# Patient Record
Sex: FEMALE
[De-identification: names, ages, dates, MRNs, and addresses within clinical notes are randomized per-mention and may not be internally consistent; named-entity substitution may affect disease eponyms.]

---

## 2017-09-19 ENCOUNTER — RESULT REVIEW (OUTPATIENT)
Age: 62
End: 2017-09-19

## 2024-04-19 ENCOUNTER — INPATIENT (INPATIENT)
Facility: HOSPITAL | Age: 69
LOS: 1 days | Discharge: HOME | DRG: 72 | End: 2024-04-21
Attending: STUDENT IN AN ORGANIZED HEALTH CARE EDUCATION/TRAINING PROGRAM | Admitting: INTERNAL MEDICINE
Payer: COMMERCIAL

## 2024-04-19 VITALS
WEIGHT: 150.58 LBS | HEIGHT: 61 IN | OXYGEN SATURATION: 98 % | RESPIRATION RATE: 18 BRPM | SYSTOLIC BLOOD PRESSURE: 155 MMHG | TEMPERATURE: 98 F | DIASTOLIC BLOOD PRESSURE: 68 MMHG | HEART RATE: 84 BPM

## 2024-04-19 LAB
ALBUMIN SERPL ELPH-MCNC: 4 G/DL — SIGNIFICANT CHANGE UP (ref 3.3–5.2)
ALP SERPL-CCNC: 78 U/L — SIGNIFICANT CHANGE UP (ref 40–120)
ALT FLD-CCNC: 18 U/L — SIGNIFICANT CHANGE UP
ANION GAP SERPL CALC-SCNC: 14 MMOL/L — SIGNIFICANT CHANGE UP (ref 5–17)
APPEARANCE UR: CLEAR — SIGNIFICANT CHANGE UP
APTT BLD: 36.7 SEC — HIGH (ref 24.5–35.6)
AST SERPL-CCNC: 20 U/L — SIGNIFICANT CHANGE UP
BACTERIA # UR AUTO: NEGATIVE /HPF — SIGNIFICANT CHANGE UP
BASE EXCESS BLDV CALC-SCNC: 0.9 MMOL/L — SIGNIFICANT CHANGE UP (ref -2–3)
BASOPHILS # BLD AUTO: 0.03 K/UL — SIGNIFICANT CHANGE UP (ref 0–0.2)
BASOPHILS NFR BLD AUTO: 0.4 % — SIGNIFICANT CHANGE UP (ref 0–2)
BILIRUB SERPL-MCNC: 0.3 MG/DL — LOW (ref 0.4–2)
BILIRUB UR-MCNC: NEGATIVE — SIGNIFICANT CHANGE UP
BUN SERPL-MCNC: 17.1 MG/DL — SIGNIFICANT CHANGE UP (ref 8–20)
CA-I SERPL-SCNC: 1.14 MMOL/L — LOW (ref 1.15–1.33)
CALCIUM SERPL-MCNC: 8.8 MG/DL — SIGNIFICANT CHANGE UP (ref 8.4–10.5)
CAST: 1 /LPF — SIGNIFICANT CHANGE UP (ref 0–4)
CHLORIDE BLDV-SCNC: 107 MMOL/L — SIGNIFICANT CHANGE UP (ref 96–108)
CHLORIDE SERPL-SCNC: 104 MMOL/L — SIGNIFICANT CHANGE UP (ref 96–108)
CO2 SERPL-SCNC: 23 MMOL/L — SIGNIFICANT CHANGE UP (ref 22–29)
COLOR SPEC: YELLOW — SIGNIFICANT CHANGE UP
CREAT SERPL-MCNC: 0.6 MG/DL — SIGNIFICANT CHANGE UP (ref 0.5–1.3)
DIFF PNL FLD: NEGATIVE — SIGNIFICANT CHANGE UP
EGFR: 97 ML/MIN/1.73M2 — SIGNIFICANT CHANGE UP
EOSINOPHIL # BLD AUTO: 0.13 K/UL — SIGNIFICANT CHANGE UP (ref 0–0.5)
EOSINOPHIL NFR BLD AUTO: 1.7 % — SIGNIFICANT CHANGE UP (ref 0–6)
GAS PNL BLDV: 139 MMOL/L — SIGNIFICANT CHANGE UP (ref 136–145)
GAS PNL BLDV: SIGNIFICANT CHANGE UP
GLUCOSE BLDV-MCNC: 113 MG/DL — HIGH (ref 70–99)
GLUCOSE SERPL-MCNC: 107 MG/DL — HIGH (ref 70–99)
GLUCOSE UR QL: NEGATIVE MG/DL — SIGNIFICANT CHANGE UP
HCO3 BLDV-SCNC: 26 MMOL/L — SIGNIFICANT CHANGE UP (ref 22–29)
HCT VFR BLD CALC: 36.7 % — SIGNIFICANT CHANGE UP (ref 34.5–45)
HCT VFR BLDA CALC: 38 % — SIGNIFICANT CHANGE UP
HGB BLD CALC-MCNC: 12.7 G/DL — SIGNIFICANT CHANGE UP (ref 11.7–16.1)
HGB BLD-MCNC: 12.5 G/DL — SIGNIFICANT CHANGE UP (ref 11.5–15.5)
IMM GRANULOCYTES NFR BLD AUTO: 0.3 % — SIGNIFICANT CHANGE UP (ref 0–0.9)
INR BLD: 0.9 RATIO — SIGNIFICANT CHANGE UP (ref 0.85–1.18)
KETONES UR-MCNC: NEGATIVE MG/DL — SIGNIFICANT CHANGE UP
LACTATE BLDV-MCNC: 2.2 MMOL/L — HIGH (ref 0.5–2)
LEUKOCYTE ESTERASE UR-ACNC: ABNORMAL
LIDOCAIN IGE QN: 46 U/L — SIGNIFICANT CHANGE UP (ref 22–51)
LYMPHOCYTES # BLD AUTO: 1.81 K/UL — SIGNIFICANT CHANGE UP (ref 1–3.3)
LYMPHOCYTES # BLD AUTO: 23.2 % — SIGNIFICANT CHANGE UP (ref 13–44)
MAGNESIUM SERPL-MCNC: 1.8 MG/DL — SIGNIFICANT CHANGE UP (ref 1.6–2.6)
MCHC RBC-ENTMCNC: 30.3 PG — SIGNIFICANT CHANGE UP (ref 27–34)
MCHC RBC-ENTMCNC: 34.1 GM/DL — SIGNIFICANT CHANGE UP (ref 32–36)
MCV RBC AUTO: 88.9 FL — SIGNIFICANT CHANGE UP (ref 80–100)
MONOCYTES # BLD AUTO: 0.71 K/UL — SIGNIFICANT CHANGE UP (ref 0–0.9)
MONOCYTES NFR BLD AUTO: 9.1 % — SIGNIFICANT CHANGE UP (ref 2–14)
NEUTROPHILS # BLD AUTO: 5.11 K/UL — SIGNIFICANT CHANGE UP (ref 1.8–7.4)
NEUTROPHILS NFR BLD AUTO: 65.3 % — SIGNIFICANT CHANGE UP (ref 43–77)
NITRITE UR-MCNC: NEGATIVE — SIGNIFICANT CHANGE UP
NT-PROBNP SERPL-SCNC: <36 PG/ML — SIGNIFICANT CHANGE UP (ref 0–300)
PCO2 BLDV: 44 MMHG — HIGH (ref 39–42)
PH BLDV: 7.38 — SIGNIFICANT CHANGE UP (ref 7.32–7.43)
PH UR: 5.5 — SIGNIFICANT CHANGE UP (ref 5–8)
PLATELET # BLD AUTO: 294 K/UL — SIGNIFICANT CHANGE UP (ref 150–400)
PO2 BLDV: 112 MMHG — HIGH (ref 25–45)
POTASSIUM BLDV-SCNC: 3.9 MMOL/L — SIGNIFICANT CHANGE UP (ref 3.5–5.1)
POTASSIUM SERPL-MCNC: 3.8 MMOL/L — SIGNIFICANT CHANGE UP (ref 3.5–5.3)
POTASSIUM SERPL-SCNC: 3.8 MMOL/L — SIGNIFICANT CHANGE UP (ref 3.5–5.3)
PROT SERPL-MCNC: 6.7 G/DL — SIGNIFICANT CHANGE UP (ref 6.6–8.7)
PROT UR-MCNC: NEGATIVE MG/DL — SIGNIFICANT CHANGE UP
PROTHROM AB SERPL-ACNC: 10 SEC — SIGNIFICANT CHANGE UP (ref 9.5–13)
RBC # BLD: 4.13 M/UL — SIGNIFICANT CHANGE UP (ref 3.8–5.2)
RBC # FLD: 12.4 % — SIGNIFICANT CHANGE UP (ref 10.3–14.5)
RBC CASTS # UR COMP ASSIST: 1 /HPF — SIGNIFICANT CHANGE UP (ref 0–4)
SAO2 % BLDV: 98.6 % — SIGNIFICANT CHANGE UP
SODIUM SERPL-SCNC: 141 MMOL/L — SIGNIFICANT CHANGE UP (ref 135–145)
SP GR SPEC: 1.02 — SIGNIFICANT CHANGE UP (ref 1–1.03)
SQUAMOUS # UR AUTO: 2 /HPF — SIGNIFICANT CHANGE UP (ref 0–5)
TROPONIN T, HIGH SENSITIVITY RESULT: 6 NG/L — SIGNIFICANT CHANGE UP (ref 0–51)
UROBILINOGEN FLD QL: 1 MG/DL — SIGNIFICANT CHANGE UP (ref 0.2–1)
WBC # BLD: 7.81 K/UL — SIGNIFICANT CHANGE UP (ref 3.8–10.5)
WBC # FLD AUTO: 7.81 K/UL — SIGNIFICANT CHANGE UP (ref 3.8–10.5)
WBC UR QL: 8 /HPF — HIGH (ref 0–5)

## 2024-04-19 PROCEDURE — 70496 CT ANGIOGRAPHY HEAD: CPT | Mod: 26,MC

## 2024-04-19 PROCEDURE — 70450 CT HEAD/BRAIN W/O DYE: CPT | Mod: 26,XU,MC

## 2024-04-19 PROCEDURE — 99285 EMERGENCY DEPT VISIT HI MDM: CPT

## 2024-04-19 PROCEDURE — 71045 X-RAY EXAM CHEST 1 VIEW: CPT | Mod: 26

## 2024-04-19 PROCEDURE — 70498 CT ANGIOGRAPHY NECK: CPT | Mod: 26,MC

## 2024-04-19 RX ORDER — DEXAMETHASONE 0.5 MG/5ML
10 ELIXIR ORAL ONCE
Refills: 0 | Status: COMPLETED | OUTPATIENT
Start: 2024-04-19 | End: 2024-04-20

## 2024-04-19 RX ORDER — ONDANSETRON 8 MG/1
4 TABLET, FILM COATED ORAL ONCE
Refills: 0 | Status: COMPLETED | OUTPATIENT
Start: 2024-04-19 | End: 2024-04-19

## 2024-04-19 RX ADMIN — ONDANSETRON 4 MILLIGRAM(S): 8 TABLET, FILM COATED ORAL at 18:30

## 2024-04-19 NOTE — ED PROVIDER NOTE - OBJECTIVE STATEMENT
A 69 yo F with pmh of HLD, ?AFib (no f/u by cardiology), on aspirin 81mg, not on AC, presents to the ED for 2 weeks of "coordination problem". Pt reports that 2 weeks ago, Pt had chest tightness while Pt was cleaning up the yard, had nausea at that time, then Pt drunk a lot water, resolved chest tightness a hour later. Since then Pt has been having nausea and dizziness (not spinning) and has difficulty in closing buttons, using a hair pin, and cooking. Denies HA, visual changes, speech problem, weakness in the extremities, or difficulty in speaking. Reports no problem for walking. Pt wants to be checked for stroke.

## 2024-04-19 NOTE — ED PROVIDER NOTE - ATTENDING CONTRIBUTION TO CARE
67 yo F with pmh of HLD, ?AFib (no f/u by cardiology), on aspirin 81mg, not on AC, presents to the ED for 2 weeks of "coordination problem". Pt reports that 2 weeks ago, Pt had chest tightness while Pt was cleaning up the yard, had nausea at that time, then Pt drunk a lot water, resolved chest tightness a hour later. Since then Pt has been having nausea and dizziness (not spinning) and has difficulty in closing buttons, using a hair pin, and cooking. Denies HA, visual changes, speech problem, weakness in the extremities, or difficulty in speaking.   pt with coordination / fine motor skills difficulty    CT sacn if negative MRI

## 2024-04-19 NOTE — ED ADULT TRIAGE NOTE - CHIEF COMPLAINT QUOTE
pt states she is losing her motor skills and nausea for 1 week. pt states she is dropping things from her hands and losing her  strength.

## 2024-04-19 NOTE — ED PROVIDER NOTE - CRANIAL NERVE AND PUPILLARY EXAM
cough reflex intact/central and peripheral vision intact/peripheral vision intact/extra-ocular movements intact/gag reflex intact/tongue is midline

## 2024-04-19 NOTE — ED ADULT NURSE REASSESSMENT NOTE - NS ED NURSE REASSESS COMMENT FT1
Pt. care assumed at 1930, no apparent distress noted at this time, charting as noted. Pt received A&Ox4, resting in stretcher, pending CT. Updated on plan of care.

## 2024-04-19 NOTE — ED PROVIDER NOTE - CLINICAL SUMMARY MEDICAL DECISION MAKING FREE TEXT BOX
Likely posterior circulation stroke vs other intracranial abnormality vs chronic/metabolic neuro disease. ECG shows NSR, no ischemia.   Will check CT H, CTA head and neck. Will check cbc, cmp, coags, trop T, lipase, mg, cxr, UA. Symptom management and reassess.

## 2024-04-19 NOTE — CONSULT NOTE ADULT - SUPERVISING ATTENDING
I personally reviewed the patient's imaging. History and plan discussed with HANSEL Ji, agree with above.

## 2024-04-19 NOTE — ED PROVIDER NOTE - SKIN, MLM
Cole Lowe needs to be seen for an appointment before further refills are given.    
Skin normal color for race, warm, dry and intact. No evidence of rash.

## 2024-04-19 NOTE — ED ADULT NURSE NOTE - OBJECTIVE STATEMENT
Pt to ED c/o decreased bilateral hand fine motor skills x1wk. Pt states she has been unable to  things that she usually can and has been dropping things. Pt to ED c/o decreased bilateral hand fine motor skills x1wk. Pt states she has been unable to  things that she usually can and has been dropping things. Pt denies weakness, headache, n/v, dizziness/lightheadedness, difficulty walking/talking.  equal. Pt A&Ox4 in NAD @ this time. RR even & unlabored. Pt to ED c/o decreased bilateral hand fine motor skills x1wk. Pt states she has been unable to  things that she usually can and has been dropping things. Pt also c/o nausea. Pt denies weakness, headache, vomiting, dizziness/lightheadedness, difficulty walking/talking.  equal. Pt A&Ox4 in NAD @ this time. RR even & unlabored.

## 2024-04-20 DIAGNOSIS — G93.9 DISORDER OF BRAIN, UNSPECIFIED: ICD-10-CM

## 2024-04-20 PROCEDURE — 70553 MRI BRAIN STEM W/O & W/DYE: CPT | Mod: 26

## 2024-04-20 PROCEDURE — 99223 1ST HOSP IP/OBS HIGH 75: CPT

## 2024-04-20 PROCEDURE — 99233 SBSQ HOSP IP/OBS HIGH 50: CPT

## 2024-04-20 PROCEDURE — 99223 1ST HOSP IP/OBS HIGH 75: CPT | Mod: GC

## 2024-04-20 PROCEDURE — 99497 ADVNCD CARE PLAN 30 MIN: CPT | Mod: GC,25

## 2024-04-20 PROCEDURE — 74177 CT ABD & PELVIS W/CONTRAST: CPT | Mod: 26

## 2024-04-20 PROCEDURE — 71260 CT THORAX DX C+: CPT | Mod: 26

## 2024-04-20 RX ORDER — DILTIAZEM HCL 120 MG
120 CAPSULE, EXT RELEASE 24 HR ORAL DAILY
Refills: 0 | Status: DISCONTINUED | OUTPATIENT
Start: 2024-04-20 | End: 2024-04-20

## 2024-04-20 RX ORDER — DILTIAZEM HCL 120 MG
180 CAPSULE, EXT RELEASE 24 HR ORAL DAILY
Refills: 0 | Status: DISCONTINUED | OUTPATIENT
Start: 2024-04-20 | End: 2024-04-20

## 2024-04-20 RX ORDER — LEVETIRACETAM 250 MG/1
500 TABLET, FILM COATED ORAL EVERY 12 HOURS
Refills: 0 | Status: DISCONTINUED | OUTPATIENT
Start: 2024-04-20 | End: 2024-04-21

## 2024-04-20 RX ORDER — ONDANSETRON 8 MG/1
4 TABLET, FILM COATED ORAL EVERY 8 HOURS
Refills: 0 | Status: DISCONTINUED | OUTPATIENT
Start: 2024-04-20 | End: 2024-04-21

## 2024-04-20 RX ORDER — DILTIAZEM HCL 120 MG
180 CAPSULE, EXT RELEASE 24 HR ORAL DAILY
Refills: 0 | Status: DISCONTINUED | OUTPATIENT
Start: 2024-04-20 | End: 2024-04-21

## 2024-04-20 RX ORDER — ACETAMINOPHEN 500 MG
650 TABLET ORAL EVERY 6 HOURS
Refills: 0 | Status: DISCONTINUED | OUTPATIENT
Start: 2024-04-20 | End: 2024-04-21

## 2024-04-20 RX ORDER — DEXAMETHASONE 0.5 MG/5ML
4 ELIXIR ORAL EVERY 6 HOURS
Refills: 0 | Status: DISCONTINUED | OUTPATIENT
Start: 2024-04-20 | End: 2024-04-21

## 2024-04-20 RX ORDER — LANOLIN ALCOHOL/MO/W.PET/CERES
3 CREAM (GRAM) TOPICAL AT BEDTIME
Refills: 0 | Status: DISCONTINUED | OUTPATIENT
Start: 2024-04-20 | End: 2024-04-21

## 2024-04-20 RX ORDER — FENOFIBRATE,MICRONIZED 130 MG
145 CAPSULE ORAL ONCE
Refills: 0 | Status: COMPLETED | OUTPATIENT
Start: 2024-04-20 | End: 2024-04-20

## 2024-04-20 RX ORDER — ATORVASTATIN CALCIUM 80 MG/1
20 TABLET, FILM COATED ORAL AT BEDTIME
Refills: 0 | Status: DISCONTINUED | OUTPATIENT
Start: 2024-04-20 | End: 2024-04-21

## 2024-04-20 RX ADMIN — ATORVASTATIN CALCIUM 20 MILLIGRAM(S): 80 TABLET, FILM COATED ORAL at 22:06

## 2024-04-20 RX ADMIN — Medication 102 MILLIGRAM(S): at 01:27

## 2024-04-20 RX ADMIN — Medication 102 MILLIGRAM(S): at 11:30

## 2024-04-20 RX ADMIN — Medication 180 MILLIGRAM(S): at 05:54

## 2024-04-20 RX ADMIN — Medication 102 MILLIGRAM(S): at 17:29

## 2024-04-20 RX ADMIN — LEVETIRACETAM 500 MILLIGRAM(S): 250 TABLET, FILM COATED ORAL at 17:29

## 2024-04-20 RX ADMIN — Medication 145 MILLIGRAM(S): at 11:31

## 2024-04-20 RX ADMIN — Medication 102 MILLIGRAM(S): at 05:52

## 2024-04-20 RX ADMIN — LEVETIRACETAM 500 MILLIGRAM(S): 250 TABLET, FILM COATED ORAL at 05:53

## 2024-04-20 NOTE — H&P ADULT - NSICDXFAMILYHX_GEN_ALL_CORE_FT
FAMILY HISTORY:  Mother  Still living? Unknown  Family history of breast cancer in mother, Age at diagnosis: 61-70

## 2024-04-20 NOTE — H&P ADULT - NSHPPHYSICALEXAM_GEN_ALL_CORE
Vital Signs Last 24 Hrs  T(C): 36.9 (19 Apr 2024 19:47), Max: 36.9 (19 Apr 2024 19:47)  T(F): 98.5 (19 Apr 2024 19:47), Max: 98.5 (19 Apr 2024 19:47)  HR: 72 (19 Apr 2024 19:47) (72 - 84)  BP: 127/66 (19 Apr 2024 19:47) (127/66 - 155/68)  BP(mean): --  RR: 18 (19 Apr 2024 19:47) (18 - 18)  SpO2: 97% (19 Apr 2024 19:47) (97% - 98%)    Const: Awake, alert and oriented. In no acute distress.   HEENT: NC/AT. Moist mucous membranes.  Eyes: No scleral icterus. EOMI. no visual deficits, perrl  Neck:. Soft and supple. Full ROM without pain.  Cardiac: Regular rate and regular rhythm. +S1/S2. No murmurs. Peripheral pulses 2+ and symmetric. No LE edema.  Resp: Speaking in full sentences. No evidence of respiratory distress. No wheezes, rales or rhonchi.  Abd: Soft, non-tender, non-distended. Normal bowel sounds. No guarding or rebound.  Back: Spine midline and non-tender. No CVAT.  Neuro: Awake, alert & oriented x 3. Moves all extremities symmetrically. CN II-XII intact, ftn intact,   Ext: 5/5 strength all extremities and sensation intact

## 2024-04-20 NOTE — CONSULT NOTE ADULT - SUBJECTIVE AND OBJECTIVE BOX
REASON FOR CONSULTATION:     HPI:  Ms Fuchs is a very pleasant 68F with a history of AFIB on ASA, HTN, HLD presents to [facility] ER with fine motor/neuro complaints which began approx 2 weeks ago and worsened since onset. Pt states she has difficulty buttoning her clothes, cooking, and holding utensils to eat. CTH WO +new Frontotemporal mass with vasogenic edema. Loaded with Keppra and Decadron in ED. Normal neuro exam. Neurosurgery consulted. Patient up to date with colonoscopy, normal colonoscopy. FH +Breast CA. No other complaints.   Oncology consulted given CT findings.     ROS negative unless mentioned. (2024 04:09)      REVIEW OF SYSTEMS:  Constitutional, Eyes, ENT, Cardiovascular, Respiratory, Gastrointestinal, Genitourinary, Musculoskeletal, Integumentary, Neurological, Psychiatric, Endocrine, Heme/Lymph, and Allergic/Immunologic review of systems are otherwise negative except as noted in the HPI.    PAST MEDICAL & SURGICAL HISTORY:  HLD (hyperlipidemia)      Paroxysmal atrial fibrillation      No significant past surgical history          FAMILY HISTORY:  Family history of breast cancer in mother (Mother)        SOCIAL HISTORY:    Allergies    oxycodone (Unknown)    Intolerances        MEDICATIONS  (STANDING):  atorvastatin 20 milliGRAM(s) Oral at bedtime  dexAMETHasone  IVPB 4 milliGRAM(s) IV Intermittent every 6 hours  diltiazem    milliGRAM(s) Oral daily  fenofibrate Tablet 145 milliGRAM(s) Oral once  levETIRAcetam   Injectable 500 milliGRAM(s) IV Push every 12 hours    MEDICATIONS  (PRN):  acetaminophen     Tablet .. 650 milliGRAM(s) Oral every 6 hours PRN Temp greater or equal to 38C (100.4F), Mild Pain (1 - 3)  aluminum hydroxide/magnesium hydroxide/simethicone Suspension 30 milliLiter(s) Oral every 4 hours PRN Dyspepsia  melatonin 3 milliGRAM(s) Oral at bedtime PRN Insomnia  ondansetron Injectable 4 milliGRAM(s) IV Push every 8 hours PRN Nausea and/or Vomiting      Vital Signs Last 24 Hrs  T(C): 36.4 (2024 09:18), Max: 37.1 (2024 04:52)  T(F): 97.6 (2024 09:18), Max: 98.7 (2024 04:52)  HR: 91 (2024 09:18) (72 - 91)  BP: 134/75 (2024 09:18) (127/66 - 155/68)  BP(mean): --  RR: 18 (2024 09:18) (18 - 19)  SpO2: 98% (2024 09:18) (97% - 98%)    Parameters below as of 2024 09:18  Patient On (Oxygen Delivery Method): room air        PHYSICAL EXAM:    GENERAL: NAD, well-groomed, well-developed  HEAD:  Atraumatic, Normocephalic  EYES: EOMI, PERRLA, conjunctiva and sclera clear  ENMT: No tonsillar erythema, exudates, or enlargement; Moist mucous membranes, Good dentition, No lesions  NECK: Supple, No JVD, Normal thyroid  NERVOUS SYSTEM:  Alert & Oriented X3, Good concentration; Motor Strength 5/5 B/L upper and lower extremities; DTRs 2+ intact and symmetric  CHEST/LUNG: Clear to auscultation bilaterally; No rales, rhonchi, wheezing, or rubs  HEART: Regular rate and rhythm; No murmurs, rubs, or gallops  ABDOMEN: Soft, Nontender, Nondistended; Bowel sounds present  EXTREMITIES:  2+ Peripheral Pulses, No clubbing, cyanosis, or edema  LYMPH: No lymphadenopathy noted  SKIN: No rashes or lesions      LABS:                        12.5   7.81  )-----------( 294      ( 2024 18:18 )             36.7     04-19    141  |  104  |  17.1  ----------------------------<  107<H>  3.8   |  23.0  |  0.60    Ca    8.8      2024 18:18  Mg     1.8     04-19    TPro  6.7  /  Alb  4.0  /  TBili  0.3<L>  /  DBili  x   /  AST  20  /  ALT  18  /  AlkPhos  78  04-19    PT/INR - ( 2024 18:18 )   PT: 10.0 sec;   INR: 0.90 ratio         PTT - ( 2024 18:18 )  PTT:36.7 sec  Urinalysis Basic - ( 2024 18:50 )    Color: Yellow / Appearance: Clear / S.024 / pH: x  Gluc: x / Ketone: Negative mg/dL  / Bili: Negative / Urobili: 1.0 mg/dL   Blood: x / Protein: Negative mg/dL / Nitrite: Negative   Leuk Esterase: Trace / RBC: 1 /HPF / WBC 8 /HPF   Sq Epi: x / Non Sq Epi: 2 /HPF / Bacteria: Negative /HPF          RADIOLOGY & ADDITIONAL STUDIES:  < from: CT Angio Head w/ IV Cont (24 @ 21:02) >  CT head: Right frontoparietal vasogenic edema secondary to underlying   enhancing cystic mass. Abscess is in the differential. This can be   further characterized with contrast-enhanced brain MRI.    CT ANGIOGRAPHY NECK: Patent cervical vasculature.  No hemodynamically   significant carotid stenosis or flow-limiting vertebral artery stenosis.    No evidence of dissection.    CT ANGIOGRAPHY BRAIN: No vessel occlusion, flow-limiting stenosis or   aneurysm.      < end of copied text >      PATHOLOGY:    
HISTORY OF PRESENT ILLNESS:   68-year-old female with PMH of afib on ASA 81mg, HTN, HLD, present to ED with chief complaint of "I'm losing my motor skills". Patient states she's been having difficulty with buttoning her shirt, combing her hair, and holding utensils for 1-2 weeks and has been worsening over the past few days. She also complains of nausea x1 week with no episodes of emesis. She denies any recent trauma, headache, dizziness, difficulty walking, or vision changes. CT Head shows right frontoparietal vasogenic edema with 3.6cm x 4.6cm enhancing mass. Neurosurgery was consulted for CTH findings. Patient denies any history of cancer.     PAST MEDICAL & SURGICAL HISTORY:  HLD (hyperlipidemia)  HTN  Atrial fibrillation     FAMILY HISTORY:  Breast cancer (mother)    SOCIAL HISTORY:  Tobacco Use: Denies  EtOH use: Denies  Substance: Denies    REVIEW OF SYSTEMS  Negative except as noted in HPI    HOME MEDICATIONS:  Fenofibrate 135mg  Lipitor 20mg HS  Diltiazem 180mg QD    MEDICATIONS:  DexAMETHasone  IVPB 10 milliGRAM(s) IV Intermittent Once    Vital Signs Last 24 Hrs  T(C): 36.9 (2024 19:47), Max: 36.9 (2024 19:47)  T(F): 98.5 (2024 19:47), Max: 98.5 (2024 19:47)  HR: 72 (2024 19:47) (72 - 84)  BP: 127/66 (2024 19:47) (127/66 - 155/68)  RR: 18 (2024 19:47) (18 - 18)  SpO2: 97% (2024 19:47) (97% - 98%)    PHYSICAL EXAM:  GENERAL: NAD, well-groomed  HEAD: Atraumatic, normocephalic  EYES: Conjunctiva and sclera clear  CHRIS COMA SCORE: E-4 V-5 M-6 = 15  MENTAL STATUS: AAOx3; awake; opens eyes spontaneously; appropriately conversant without aphasia; following simple commands  CRANIAL NERVES: Visual fields full to confrontation, PERRL. EOMI without nystagmus. Facial sensation intact V1-3 distribution b/l. Face symmetric, tongue midline. Hearing grossly intact. Speech clear.  REFLEXES: PERRL. Corneals intact b/l. Gag intact. Cough intact. Oculocephalic reflex intact (Doll's eye). Negative Arias's b/l. Negative clonus b/l  MOTOR: Strength 5/5 b/l upper and lower extremities  SENSATION: Grossly intact to light touch all extremities  COORDINATION: No upper extremity dysmetria, no UE drift  CHEST/LUNG: Non-labored breathing on room-air  SKIN: Warm, dry    LABS:                        12.5   7.81  )-----------( 294      ( 2024 18:18 )             36.7         141  |  104  |  17.1  ----------------------------<  107<H>  3.8   |  23.0  |  0.60    Ca    8.8      2024 18:18  Mg     1.8         TPro  6.7  /  Alb  4.0  /  TBili  0.3<L>  /  DBili  x   /  AST  20  /  ALT  18  /  AlkPhos  78      PT/INR - ( 2024 18:18 )   PT: 10.0 sec;   INR: 0.90 ratio    PTT - ( 2024 18:18 )  PTT:36.7 sec    Urinalysis Basic - ( 2024 18:50 )  Color: Yellow / Appearance: Clear / S.024 / pH: x  Gluc: x / Ketone: Negative mg/dL  / Bili: Negative / Urobili: 1.0 mg/dL   Blood: x / Protein: Negative mg/dL / Nitrite: Negative   Leuk Esterase: Trace / RBC: 1 /HPF / WBC 8 /HPF   Sq Epi: x / Non Sq Epi: 2 /HPF / Bacteria: Negative /HPF    RADIOLOGY & ADDITIONAL STUDIES:    CT Angio Head w/ IV Cont (24 @ 21:02)  IMPRESSION:  CT Head: Right frontoparietal vasogenic edema secondary to underlying   enhancing cystic mass. Abscess is in the differential. This can be   further characterized with contrast-enhanced brain MRI.    CT ANGIOGRAPHY NECK: Patent cervical vasculature.  No hemodynamically   significant carotid stenosis or flow-limiting vertebral artery stenosis.    No evidence of dissection.    CT ANGIOGRAPHY BRAIN: No vessel occlusion, flow-limiting stenosis or   aneurysm.

## 2024-04-20 NOTE — CONSULT NOTE ADULT - ASSESSMENT
68F with PMH HTN, HLD, afib on ASA 81mg presents to ED with 2 weeks of nausea and difficulty with fine motor skills with buttoning shirts, combing hair, and holding utensils. CTH shows right frontoparietal vasogenic edema with underlying enhancing mass.    Plan:  - CT imaging shows R frontoparietal vasogenic edema with enhancing 3.6cm x 4.6cm mass   - Recommend MRI Brain w and w/o IV contrast stereotactic with T2 axial sequences to evaluate mass  - Decadron 4mg q6h for cerebral edema  - Keppra 500 BID for seizure ppx  - Q4h neuro checks  - Neurosurgery will continue to follow, more recommendations pending MRI imaging  - Medicine evaluation for metastatic disease w/u, needs CT CAP  - Please hold ASA and chemoprophylaxis for now  - SCDs for DVT ppx  - Discussed with Dr. Cruz
Ms Fuchs is a very pleasant 68F with a history of AFIB on ASA, HTN, HLD presents to [facility] ER with fine motor/neuro complaints which began approx 2 weeks ago and worsened since onset. Pt states she has difficulty buttoning her clothes, cooking, and holding utensils to eat. CTH WO +new Frontotemporal mass with vasogenic edema.    Brain mass: Agree with current management, obtain MRI brain as well as CT chest/abd/pelv, appreciate neurosurgery recs regarding diagnostic/therapuetic intervention, continue keppra and decadron as outlined by Dr Anthony.  Will continue to follow

## 2024-04-20 NOTE — ED ADULT NURSE REASSESSMENT NOTE - NS ED NURSE REASSESS COMMENT FT1
Assumed care of pt from previous RN. Pt RR even and unlabored, NAD noted. Neurology team at bedside. Pt aware of plan of care. Pt denies any pain or SOB. Pt given the opportunity to ask questions with Neuro and RN. Pt bed in lowest locked position, able to make needs known.

## 2024-04-20 NOTE — ED ADULT NURSE REASSESSMENT NOTE - NS ED NURSE REASSESS COMMENT FT1
Pt. is resting in bed comfortably at this time, no apparent distress noted at this time. Pt. safety maintained. Pt. pending MRI results, updated on plan of care. Pending tele bed.

## 2024-04-20 NOTE — H&P ADULT - CONVERSATION DETAILS
Discussed current workup and current treatment plan with patient and answered all questions.    Discussed code status. Patient is DNR/ DNI. Molst filled out and put in chart. Patient states if her heart were to stop, it is a sign it is her time and she would not want further treatment then. She would not want intubation/ ventilator support in Taoist of respiratory failure. She does want treatment/ workup short of cpr/ intubation.    Surrogate is her  Bello Fuchs

## 2024-04-20 NOTE — H&P ADULT - NSHPROSALLOTHERNEGRD_GEN_ALL_CORE
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     
All other review of systems negative, except as noted in HPI

## 2024-04-20 NOTE — H&P ADULT - ASSESSMENT
68F with PMH HTN, HLD, afib not on anticoagulation presents for 2 weeks difficulty with fine motor skills, admitted for workup/management of newly found Frontotemporal mass with vasogenic edema.      # Frontotemporal mass with vasogenic edema.  # New onset Fine motor impairment  Concern malignancy, workup ongoing.   - 3.6 cmx 4.6 cm right frontotemporal mass on CT  - ct c/a/p  - f/u MRI read   - iv dexamethasone 4mg q6h for edema  - no blood thinners  - q4 neuro checks  - neurosurgery following  - keppra 500mg bid po  - fall and seizure precautions    # afib  not on anticoagulation due to gi intolerance, pt on aspirin for cva prevention. Rate controlled  - continue diltiazem  - hold asa  - tele  - anticoagulation contraindicated given mass    # hld  - continue atorvastatin and fenofibrate    Dvt prophylaxis: pharmacologic contraindicated given brain mass, scds  Fall and seizure precautions  Diet   68F with PMH HTN, HLD, afib not on anticoagulation presents for 2 weeks difficulty with fine motor skills, admitted for workup/management of newly found Frontotemporal mass with vasogenic edema.      # Frontotemporal mass with vasogenic edema.  # New onset Fine motor impairment  Concern malignancy, workup ongoing.   - 3.6 cmx 4.6 cm right frontotemporal mass on CT and mild mass effect on right lateral ventricle  - ct c/a/p  - f/u MRI read   - iv dexamethasone 4mg q6h for edema  - no blood thinners  - q4 neuro checks  - neurosurgery following  - keppra 500mg bid po  - fall and seizure precautions    # afib  not on anticoagulation due to gi intolerance, pt on aspirin for cva prevention. Rate controlled  - continue diltiazem  - hold asa  - tele  - anticoagulation contraindicated given mass    # hld  - continue atorvastatin and fenofibrate    Dvt prophylaxis: pharmacologic contraindicated given brain mass, scds  Fall and seizure precautions  Diet ASSESSMENT:  68F with PMHX AFIB on ASA, HTN, HLD presents to [facility] ER with fine motor/neuro complaints which began approx 2 weeks ago and worsened since onset admitted for new Frontotemporal Mass with Vasogenic Edema and mild mass effect on R Lateral Ventricle.    PLAN:  # Frontotemporal Mass  -CTH WO - 3.6 cmx 4.6 cm right frontotemporal mass on CT and mild mass effect on right lateral ventricle  -Admit to SDU  -Neurochecks q2  -MRI Brain Stereotactic w/wo pending  -Dexamethasone 10mg IV x1 -> Continue Decadron 4mg IV q6  -Keppra 1g Loaded -> Continue 500mg IV BID  -Check CT C/A/P IVC r/o metastatic disease  -Hold pharmacological AP/AC  -VTE PPX SCDs  -Seizure Precautions  -NPO  -Neurosurgery Consulted  -Rafael Consulted    #AFib  -not on anticoagulation due to gi intolerance, pt on aspirin for cva prevention.   -continue diltiazem 180mg q24  -hold ASA 162mg q24  -Monitor Telemetry currently rate controlled    #HLD  - continue atorvastatin and fenofibrate    Dvt prophylaxis: pharmacologic contraindicated given brain mass, scds for now  Fall and seizure precautions  Diet: NPO

## 2024-04-20 NOTE — PROGRESS NOTE ADULT - SUBJECTIVE AND OBJECTIVE BOX
HPI:  68F with PMHX AFIB on ASA, HTN, HLD presents to [facility] ER with fine motor/neuro complaints which began approx 2 weeks ago and worsened since onset. Pt states she has difficulty buttoning her clothes, cooking, and holding utensils to eat. CTH WO +new Frontotemporal mass with vasogenic edema. Loaded with Keppra and Decadron in ED. Normal neuro exam. Neurosurgery consulted. Patient up to date with colonoscopy, normal colonoscopy. FH +Breast CA. No other complaints.  MRI brain done overnight shows a right frontotemporal mass with edema, mass effect, awaiting final report.    ROS negative unless mentioned. (20 Apr 2024 04:09)      INTERVAL HPI/OVERNIGHT EVENTS:  68y Female in bed comfortably. reports her left hand has been dropping things.     ICU Vital Signs Last 24 Hrs  T(C): 36.4 (20 Apr 2024 09:18), Max: 37.1 (20 Apr 2024 04:52)  T(F): 97.6 (20 Apr 2024 09:18), Max: 98.7 (20 Apr 2024 04:52)  HR: 91 (20 Apr 2024 09:18) (72 - 91)  BP: 134/75 (20 Apr 2024 09:18) (127/66 - 155/68)  BP(mean): --  ABP: --  ABP(mean): --  RR: 18 (20 Apr 2024 09:18) (18 - 19)  SpO2: 98% (20 Apr 2024 09:18) (97% - 98%)    O2 Parameters below as of 20 Apr 2024 09:18  Patient On (Oxygen Delivery Method): room air      PHYSICAL EXAM:  GENERAL: NAD, well-groomed  HEAD:  Atraumatic, normocephalic  gcs 15  MENTAL STATUS: AAO x3; Opens eyes spontaneously Appropriately conversant without aphasia; following simple commands  CRANIAL NERVES: Visual acuity normal for age, visual fields full to confrontation, PERRL. EOMI without nystagmus. normal facial sensation and movement   Speech clear.   MOTOR: strength 5/5 b/l upper and lower extremities except left hand  4+  SENSATION: grossly intact to light touch all extremities                            12.5   7.81  )-----------( 294      ( 19 Apr 2024 18:18 )             36.7     04-19    141  |  104  |  17.1  ----------------------------<  107<H>  3.8   |  23.0  |  0.60    Ca    8.8      19 Apr 2024 18:18  Mg     1.8     04-19    TPro  6.7  /  Alb  4.0  /  TBili  0.3<L>  /  DBili  x   /  AST  20  /  ALT  18  /  AlkPhos  78  04-19        RADIOLOGY & ADDITIONAL TESTS:  MRI brain, final report pending.

## 2024-04-20 NOTE — H&P ADULT - NSHPREVIEWOFSYSTEMS_GEN_ALL_CORE
Const: Denies fever, chills  HEENT: Denies blurry vision, sore throat  Neck: Denies neck pain/stiffness  Resp: Denies coughing, SOB  Cardiovascular: Denies CP, palpitations, LE edema  GI: +nausea, deny vomiting, abdominal pain, diarrhea, constipation  : Denies urinary frequenc, dysuria  Neuro: Denies HA, dizziness, numbness, weakness  Skin: Denies rashes.

## 2024-04-20 NOTE — H&P ADULT - NSICDXPASTMEDICALHX_GEN_ALL_CORE_FT
PAST MEDICAL HISTORY:  HLD (hyperlipidemia)     Paroxysmal atrial fibrillation      67926 Detailed 31614 Exp Problem Focused - Mod. Complex

## 2024-04-20 NOTE — CHART NOTE - NSCHARTNOTEFT_GEN_A_CORE
Assumed care from admitting physician.     # Frontotemporal mass- Neurosurgery following. Started on diet. continue keppra. Patient prefer to undergo surgery after discussing with PCP.  # A.fib- on diltiazem    Rest of the plan per H&P. None

## 2024-04-20 NOTE — H&P ADULT - TIME BILLING
Labs/Imaging/Notes reviewed. Med rec completed. Orders placed. Neurosurgery consulted. [facility] consulted.

## 2024-04-20 NOTE — H&P ADULT - NSHPLABSRESULTS_GEN_ALL_CORE
LABS:                         12.5   7.81  )-----------( 294      ( 2024 18:18 )             36.7     04-    141  |  104  |  17.1  ----------------------------<  107<H>  3.8   |  23.0  |  0.60    Ca    8.8      2024 18:18  Mg     1.8     -    TPro  6.7  /  Alb  4.0  /  TBili  0.3<L>  /  DBili  x   /  AST  20  /  ALT  18  /  AlkPhos  78  04-    PT/INR - ( 2024 18:18 )   PT: 10.0 sec;   INR: 0.90 ratio         PTT - ( 2024 18:18 )  PTT:36.7 sec  Urinalysis Basic - ( 2024 18:50 )    Color: Yellow / Appearance: Clear / S.024 / pH: x  Gluc: x / Ketone: Negative mg/dL  / Bili: Negative / Urobili: 1.0 mg/dL   Blood: x / Protein: Negative mg/dL / Nitrite: Negative   Leuk Esterase: Trace / RBC: 1 /HPF / WBC 8 /HPF   Sq Epi: x / Non Sq Epi: 2 /HPF / Bacteria: Negative /HPF            RADIOLOGY, ADDITIONAL TESTS: Reviewed.

## 2024-04-20 NOTE — PROGRESS NOTE ADULT - ASSESSMENT
68 year old female presents with loss of dexterity and strength left hand found to have a right frontotemporal lesion on MRI brain    CT chest, abdomen and pelvis pending  recommended the patient stay for further workup and resection of this lesion given size, mass effect and symptoms  dexa 4 q 6 hours   AEDs  neuro checks q 4 hours  SCDs for dvt ppx  attempted to call patients PCP   bettina Cruz 68 year old female presents with loss of dexterity and strength left hand found to have a right frontotemporal lesion on MRI brain    CT chest, abdomen and pelvis pending  recommended the patient stay for further workup and resection of this lesion given size, mass effect and symptoms  dex 4 q 6 hours   AEDs  neuro checks q 4 hours  SCDs for dvt ppx  attempted to call patients PCP   bettina Cruz

## 2024-04-20 NOTE — H&P ADULT - ATTENDING COMMENTS
Changes made to above HPI/Asssessment/Plan otherwise agree with documentation aobve, in brief:     ASSESSMENT:  68F with PMHX AFIB on ASA, HTN, HLD presents to [facility] ER with fine motor/neuro complaints which began approx 2 weeks ago and worsened since onset admitted for new Frontotemporal Mass with Vasogenic Edema and mild mass effect on R Lateral Ventricle.    PLAN:  # Frontotemporal Mass  -CTH WO - 3.6 cmx 4.6 cm right frontotemporal mass on CT and mild mass effect on right lateral ventricle  -Admit to SDU  -Neurochecks q2  -MRI Brain Stereotactic w/wo pending  -Dexamethasone 10mg IV x1 -> Continue Decadron 4mg IV q6  -Keppra 1g Loaded -> Continue 500mg IV BID  -Check CT C/A/P IVC r/o metastatic disease  -Hold pharmacological AP/AC  -VTE PPX SCDs  -Seizure Precautions  -NPO  -Neurosurgery Consulted  -Rafael Consulted    #AFib  -not on anticoagulation due to gi intolerance, pt on aspirin for cva prevention.   -continue diltiazem 180mg q24  -hold ASA 162mg q24  -Monitor Telemetry currently rate controlled    #HLD  - continue atorvastatin and fenofibrate

## 2024-04-20 NOTE — ED ADULT NURSE REASSESSMENT NOTE - NS ED NURSE REASSESS COMMENT FT1
Pt. remains A+Ox4, resting comfortably, NAD. Pt. pending MRI, updated on plan of care. Charting as noted.

## 2024-04-20 NOTE — H&P ADULT - HISTORY OF PRESENT ILLNESS
68F with PMH HTN, HLD, afib not on anticoagulation presents for 2 weeks difficulty with fine motor skills, admitted for workup/management of newly found Frontotemporal mass with vasogenic edema.    2 weeks ago, she had new onset loss of fine motor skills affecting basic adls. + nausea. She has difficulty using utensils, dressing herself, tying her hair, cooking. She denies other neuro symptoms: no confusion, word finding difficulty, weakness, headache, gait disturbance, vision change, facial asymetry, numbness.    Otherwise, she has no additional symptoms. No weight loss, cough or dyspnea, diarrhea or constipation, or abdominal pain.    Patient up to date with colonoscopy, normal colonoscopy   68F with PMH HTN, HLD, afib not on anticoagulation presents for 2 weeks difficulty with fine motor skills, admitted for workup/management of newly found Frontotemporal mass with vasogenic edema.    Ed workup with Frontotemporal mass with vasogenic edema, neurosurgery consulted.    2 weeks ago, she had new onset loss of fine motor skills affecting basic adls. + nausea. She has difficulty using utensils, dressing herself, tying her hair, cooking. She denies other neuro symptoms: no confusion, word finding difficulty, weakness, headache, gait disturbance, vision change, facial asymetry, numbness.    Otherwise, she has no additional symptoms. No weight loss, cough or dyspnea, diarrhea or constipation, or abdominal pain.    Patient up to date with colonoscopy, normal colonoscopy 68F with PMHX AFIB on ASA, HTN, HLD presents to [facility] ER with fine motor/neuro complaints which began approx 2 weeks ago and worsened since onset. Pt states she has difficulty buttoning her clothes and holding utensils to eat. CTH WO +new Frontotemporal mass with vasogenic edema. Neurosurgery consulted.     2 weeks ago, she had new onset loss of fine motor skills affecting basic adls. + nausea. She has difficulty using utensils, dressing herself, tying her hair, cooking. She denies other neuro symptoms: no confusion, word finding difficulty, weakness, headache, gait disturbance, vision change, facial asymetry, numbness.    Otherwise, she has no additional symptoms. No weight loss, cough or dyspnea, diarrhea or constipation, or abdominal pain.    Patient up to date with colonoscopy, normal colonoscopy 68F with PMHX AFIB on ASA, HTN, HLD presents to [facility] ER with fine motor/neuro complaints which began approx 2 weeks ago and worsened since onset. Pt states she has difficulty buttoning her clothes, cooking, and holding utensils to eat. CTH WO +new Frontotemporal mass with vasogenic edema. Loaded with Keppra and Decadron in ED. Normal neuro exam. Neurosurgery consulted. Patient up to date with colonoscopy, normal colonoscopy. FH +Breast CA. No other complaints.     ROS negative unless mentioned.

## 2024-04-21 ENCOUNTER — TRANSCRIPTION ENCOUNTER (OUTPATIENT)
Age: 69
End: 2024-04-21

## 2024-04-21 VITALS
RESPIRATION RATE: 18 BRPM | OXYGEN SATURATION: 97 % | HEART RATE: 82 BPM | TEMPERATURE: 98 F | SYSTOLIC BLOOD PRESSURE: 120 MMHG | DIASTOLIC BLOOD PRESSURE: 68 MMHG

## 2024-04-21 LAB
ANION GAP SERPL CALC-SCNC: 14 MMOL/L — SIGNIFICANT CHANGE UP (ref 5–17)
APTT BLD: 32.1 SEC — SIGNIFICANT CHANGE UP (ref 24.5–35.6)
BASOPHILS # BLD AUTO: 0.02 K/UL — SIGNIFICANT CHANGE UP (ref 0–0.2)
BASOPHILS NFR BLD AUTO: 0.1 % — SIGNIFICANT CHANGE UP (ref 0–2)
BUN SERPL-MCNC: 18.9 MG/DL — SIGNIFICANT CHANGE UP (ref 8–20)
CALCIUM SERPL-MCNC: 9.7 MG/DL — SIGNIFICANT CHANGE UP (ref 8.4–10.5)
CHLORIDE SERPL-SCNC: 102 MMOL/L — SIGNIFICANT CHANGE UP (ref 96–108)
CO2 SERPL-SCNC: 24 MMOL/L — SIGNIFICANT CHANGE UP (ref 22–29)
CREAT SERPL-MCNC: 0.63 MG/DL — SIGNIFICANT CHANGE UP (ref 0.5–1.3)
EGFR: 96 ML/MIN/1.73M2 — SIGNIFICANT CHANGE UP
EOSINOPHIL # BLD AUTO: 0 K/UL — SIGNIFICANT CHANGE UP (ref 0–0.5)
EOSINOPHIL NFR BLD AUTO: 0 % — SIGNIFICANT CHANGE UP (ref 0–6)
GLUCOSE SERPL-MCNC: 157 MG/DL — HIGH (ref 70–99)
HCT VFR BLD CALC: 36.4 % — SIGNIFICANT CHANGE UP (ref 34.5–45)
HCV AB S/CO SERPL IA: 0.17 S/CO — SIGNIFICANT CHANGE UP (ref 0–0.99)
HCV AB SERPL-IMP: SIGNIFICANT CHANGE UP
HGB BLD-MCNC: 12.6 G/DL — SIGNIFICANT CHANGE UP (ref 11.5–15.5)
IMM GRANULOCYTES NFR BLD AUTO: 0.6 % — SIGNIFICANT CHANGE UP (ref 0–0.9)
INR BLD: 0.94 RATIO — SIGNIFICANT CHANGE UP (ref 0.85–1.18)
LYMPHOCYTES # BLD AUTO: 1.4 K/UL — SIGNIFICANT CHANGE UP (ref 1–3.3)
LYMPHOCYTES # BLD AUTO: 7.5 % — LOW (ref 13–44)
MCHC RBC-ENTMCNC: 30.2 PG — SIGNIFICANT CHANGE UP (ref 27–34)
MCHC RBC-ENTMCNC: 34.6 GM/DL — SIGNIFICANT CHANGE UP (ref 32–36)
MCV RBC AUTO: 87.3 FL — SIGNIFICANT CHANGE UP (ref 80–100)
MONOCYTES # BLD AUTO: 0.8 K/UL — SIGNIFICANT CHANGE UP (ref 0–0.9)
MONOCYTES NFR BLD AUTO: 4.3 % — SIGNIFICANT CHANGE UP (ref 2–14)
NEUTROPHILS # BLD AUTO: 16.41 K/UL — HIGH (ref 1.8–7.4)
NEUTROPHILS NFR BLD AUTO: 87.5 % — HIGH (ref 43–77)
PLATELET # BLD AUTO: 331 K/UL — SIGNIFICANT CHANGE UP (ref 150–400)
POTASSIUM SERPL-MCNC: 4.1 MMOL/L — SIGNIFICANT CHANGE UP (ref 3.5–5.3)
POTASSIUM SERPL-SCNC: 4.1 MMOL/L — SIGNIFICANT CHANGE UP (ref 3.5–5.3)
PROTHROM AB SERPL-ACNC: 10.4 SEC — SIGNIFICANT CHANGE UP (ref 9.5–13)
RBC # BLD: 4.17 M/UL — SIGNIFICANT CHANGE UP (ref 3.8–5.2)
RBC # FLD: 12.4 % — SIGNIFICANT CHANGE UP (ref 10.3–14.5)
SODIUM SERPL-SCNC: 140 MMOL/L — SIGNIFICANT CHANGE UP (ref 135–145)
WBC # BLD: 18.75 K/UL — HIGH (ref 3.8–10.5)
WBC # FLD AUTO: 18.75 K/UL — HIGH (ref 3.8–10.5)

## 2024-04-21 PROCEDURE — 99239 HOSP IP/OBS DSCHRG MGMT >30: CPT

## 2024-04-21 PROCEDURE — 85014 HEMATOCRIT: CPT

## 2024-04-21 PROCEDURE — 85730 THROMBOPLASTIN TIME PARTIAL: CPT

## 2024-04-21 PROCEDURE — 85018 HEMOGLOBIN: CPT

## 2024-04-21 PROCEDURE — 85610 PROTHROMBIN TIME: CPT

## 2024-04-21 PROCEDURE — 83605 ASSAY OF LACTIC ACID: CPT

## 2024-04-21 PROCEDURE — 85025 COMPLETE CBC W/AUTO DIFF WBC: CPT

## 2024-04-21 PROCEDURE — 93005 ELECTROCARDIOGRAM TRACING: CPT

## 2024-04-21 PROCEDURE — 80048 BASIC METABOLIC PNL TOTAL CA: CPT

## 2024-04-21 PROCEDURE — 70496 CT ANGIOGRAPHY HEAD: CPT | Mod: MC

## 2024-04-21 PROCEDURE — 97163 PT EVAL HIGH COMPLEX 45 MIN: CPT

## 2024-04-21 PROCEDURE — 83690 ASSAY OF LIPASE: CPT

## 2024-04-21 PROCEDURE — 80053 COMPREHEN METABOLIC PANEL: CPT

## 2024-04-21 PROCEDURE — 83880 ASSAY OF NATRIURETIC PEPTIDE: CPT

## 2024-04-21 PROCEDURE — 71260 CT THORAX DX C+: CPT | Mod: MC

## 2024-04-21 PROCEDURE — 99232 SBSQ HOSP IP/OBS MODERATE 35: CPT

## 2024-04-21 PROCEDURE — 96374 THER/PROPH/DIAG INJ IV PUSH: CPT

## 2024-04-21 PROCEDURE — 84295 ASSAY OF SERUM SODIUM: CPT

## 2024-04-21 PROCEDURE — 82803 BLOOD GASES ANY COMBINATION: CPT

## 2024-04-21 PROCEDURE — 82947 ASSAY GLUCOSE BLOOD QUANT: CPT

## 2024-04-21 PROCEDURE — 99285 EMERGENCY DEPT VISIT HI MDM: CPT

## 2024-04-21 PROCEDURE — 84484 ASSAY OF TROPONIN QUANT: CPT

## 2024-04-21 PROCEDURE — 71045 X-RAY EXAM CHEST 1 VIEW: CPT

## 2024-04-21 PROCEDURE — 86803 HEPATITIS C AB TEST: CPT

## 2024-04-21 PROCEDURE — 70553 MRI BRAIN STEM W/O & W/DYE: CPT | Mod: MC

## 2024-04-21 PROCEDURE — 82330 ASSAY OF CALCIUM: CPT

## 2024-04-21 PROCEDURE — A9579: CPT

## 2024-04-21 PROCEDURE — 70498 CT ANGIOGRAPHY NECK: CPT | Mod: MC

## 2024-04-21 PROCEDURE — 70450 CT HEAD/BRAIN W/O DYE: CPT | Mod: MC

## 2024-04-21 PROCEDURE — 81001 URINALYSIS AUTO W/SCOPE: CPT

## 2024-04-21 PROCEDURE — 84132 ASSAY OF SERUM POTASSIUM: CPT

## 2024-04-21 PROCEDURE — 82435 ASSAY OF BLOOD CHLORIDE: CPT

## 2024-04-21 PROCEDURE — 74177 CT ABD & PELVIS W/CONTRAST: CPT | Mod: MC

## 2024-04-21 PROCEDURE — 36415 COLL VENOUS BLD VENIPUNCTURE: CPT

## 2024-04-21 PROCEDURE — 83735 ASSAY OF MAGNESIUM: CPT

## 2024-04-21 RX ORDER — DEXAMETHASONE 0.5 MG/5ML
1 ELIXIR ORAL
Qty: 28 | Refills: 0
Start: 2024-04-21 | End: 2024-04-27

## 2024-04-21 RX ORDER — LEVETIRACETAM 250 MG/1
1 TABLET, FILM COATED ORAL
Qty: 60 | Refills: 0
Start: 2024-04-21 | End: 2024-05-20

## 2024-04-21 RX ORDER — ASPIRIN/CALCIUM CARB/MAGNESIUM 324 MG
2 TABLET ORAL
Refills: 0 | DISCHARGE

## 2024-04-21 RX ADMIN — Medication 180 MILLIGRAM(S): at 05:44

## 2024-04-21 RX ADMIN — Medication 102 MILLIGRAM(S): at 05:43

## 2024-04-21 RX ADMIN — LEVETIRACETAM 500 MILLIGRAM(S): 250 TABLET, FILM COATED ORAL at 05:44

## 2024-04-21 RX ADMIN — Medication 102 MILLIGRAM(S): at 00:00

## 2024-04-21 RX ADMIN — Medication 102 MILLIGRAM(S): at 11:36

## 2024-04-21 NOTE — PHYSICAL THERAPY INITIAL EVALUATION ADULT - CRITERIA FOR SKILLED THERAPEUTIC INTERVENTIONS
Home no needs. Pt is independent in transfers, ambulation, and stairs. Pt has no need for skilled PT. PT will no longer follow./anticipated discharge recommendation

## 2024-04-21 NOTE — DISCHARGE NOTE PROVIDER - NSFOLLOWUPCLINICS_GEN_ALL_ED_FT
[facility]  Hematology/Oncology  [address] [place] [address]  Phone: [phone]  Fax:

## 2024-04-21 NOTE — PROGRESS NOTE ADULT - SUBJECTIVE AND OBJECTIVE BOX
HPI:  68F with PMHX AFIB on ASA, HTN, HLD presents to [facility] ER with fine motor/neuro complaints which began approx 2 weeks ago and worsened since onset. Pt states she has difficulty buttoning her clothes, cooking, and holding utensils to eat. CTH WO +new Frontotemporal mass with vasogenic edema. Loaded with Keppra and Decadron in ED. Normal neuro exam. Neurosurgery consulted. Patient up to date with colonoscopy, normal colonoscopy. FH +Breast CA. No other complaints.     ROS negative unless mentioned. (20 Apr 2024 04:09)    INTERVAL HPI/OVERNIGHT EVENTS:  68y Female seen and examined by neurosurgery team during AM rounds. Reports fine motor skills have been improving since starting steroids. Patient is open to discussing neurosurgical treatment but would still prefer to confer with her PCP tomorrow first. States she would be more comfortable being discharged home and following up outpatient to finalize plan.      Vital Signs Last 24 Hrs  T(C): 36.7 (21 Apr 2024 11:00), Max: 37 (20 Apr 2024 15:34)  T(F): 98 (21 Apr 2024 11:00), Max: 98.6 (20 Apr 2024 15:34)  HR: 90 (21 Apr 2024 11:00) (72 - 92)  BP: 122/78 (21 Apr 2024 11:00) (103/63 - 141/67)  BP(mean): 76 (21 Apr 2024 04:51) (76 - 98)  RR: 18 (21 Apr 2024 11:00) (18 - 18)  SpO2: 97% (21 Apr 2024 11:00) (92% - 98%)    Parameters below as of 21 Apr 2024 11:00  Patient On (Oxygen Delivery Method): room air    PHYSICAL EXAM:  GENERAL: NAD, well-groomed  HEAD:  Atraumatic, normocephalic  CHRIS COMA SCORE: E-4 V-5 M-6 = 15  MENTAL STATUS: AAO x3; Awake; Opens eyes spontaneously; Appropriately conversant without aphasia; follows commands  CRANIAL NERVES: PERRL. EOMI without nystagmus. Face symmetric, tongue midline. Hearing grossly intact. Speech clear.   MOTOR: strength 5/5 b/l upper and lower extremities  SENSATION: grossly intact to light touch all extremities  COORDINATION: Gait testing deferred  CHEST/LUNG: Non-labored breathing on room air  SKIN: Warm, dry    LABS:                        12.6   18.75 )-----------( 331      ( 21 Apr 2024 05:25 )             36.4     04-21    140  |  102  |  18.9  ----------------------------<  157<H>  4.1   |  24.0  |  0.63    Ca    9.7      21 Apr 2024 05:25  Mg     1.8     04-19    TPro  6.7  /  Alb  4.0  /  TBili  0.3<L>  /  DBili  x   /  AST  20  /  ALT  18  /  AlkPhos  78  04-19    PT/INR - ( 21 Apr 2024 05:25 )   PT: 10.4 sec;   INR: 0.94 ratio         PTT - ( 21 Apr 2024 05:25 )  PTT:32.1 sec  Urinalysis Basic - ( 21 Apr 2024 05:25 )    Color: x / Appearance: x / SG: x / pH: x  Gluc: 157 mg/dL / Ketone: x  / Bili: x / Urobili: x   Blood: x / Protein: x / Nitrite: x   Leuk Esterase: x / RBC: x / WBC x   Sq Epi: x / Non Sq Epi: x / Bacteria: x        04-21 @ 07:01  -  04-21 @ 10:43  --------------------------------------------------------  IN: 400 mL / OUT: 0 mL / NET: 400 mL        RADIOLOGY & ADDITIONAL TESTS:  < from: MR Brain Stereotactic w/wo IV Cont (04.20.24 @ 03:10) >  IMPRESSION:  3.7 cm TRV by 4.2 cm AP by 4.2 cm cc necrotic enhancing   heterogeneous mass in the posterior RIGHT frontal lobe with moderate   edema is suspicious for a high-grade glial neoplasm. There is mild   effacement of the RIGHT lateral ventricle with 4.6 mm subfalcine   herniation to the LEFT.    < end of copied text >    < from: CT Chest w/ IV Cont (04.20.24 @ 12:01) >  IMPRESSION:  No evidence of a primary malignancy or metastatic disease in the chest,   abdomen or pelvis.    < end of copied text >   HPI:  68F with PMHX AFIB on ASA, HTN, HLD presents to [facility] ER with fine motor/neuro complaints which began approx 2 weeks ago and worsened since onset. Pt states she has difficulty buttoning her clothes, cooking, and holding utensils to eat. CTH WO +new Frontotemporal mass with vasogenic edema. Loaded with Keppra and Decadron in ED. Normal neuro exam. Neurosurgery consulted. Patient up to date with colonoscopy, normal colonoscopy. FH +Breast CA. No other complaints.     ROS negative unless mentioned. (20 Apr 2024 04:09)    INTERVAL HPI/OVERNIGHT EVENTS:  68y Female seen and examined by neurosurgery team during AM rounds. Reports fine motor skills have been improving since starting steroids. Patient is open to discussing neurosurgical treatment but would still prefer to confer with her PCP tomorrow first. States she would be more comfortable being discharged home and following up outpatient to finalize plan.      Vital Signs Last 24 Hrs  T(C): 36.7 (21 Apr 2024 11:00), Max: 37 (20 Apr 2024 15:34)  T(F): 98 (21 Apr 2024 11:00), Max: 98.6 (20 Apr 2024 15:34)  HR: 90 (21 Apr 2024 11:00) (72 - 92)  BP: 122/78 (21 Apr 2024 11:00) (103/63 - 141/67)  BP(mean): 76 (21 Apr 2024 04:51) (76 - 98)  RR: 18 (21 Apr 2024 11:00) (18 - 18)  SpO2: 97% (21 Apr 2024 11:00) (92% - 98%)    Parameters below as of 21 Apr 2024 11:00  Patient On (Oxygen Delivery Method): room air    PHYSICAL EXAM:  GENERAL: NAD, well-groomed  HEAD:  Atraumatic, normocephalic  CHRIS COMA SCORE: E-4 V-5 M-6 = 15  MENTAL STATUS: AAO x3; Awake; Opens eyes spontaneously; Appropriately conversant without aphasia; follows commands  CRANIAL NERVES: PERRL. EOMI without nystagmus. Face symmetric, tongue midline. Hearing grossly intact. Speech clear.   MOTOR: strength 5/5 b/l upper and lower extremities except for left  4+  COORDINATION: Gait testing deferred  CHEST/LUNG: Non-labored breathing on room air  SKIN: Warm, dry    LABS:                        12.6   18.75 )-----------( 331      ( 21 Apr 2024 05:25 )             36.4     04-21    140  |  102  |  18.9  ----------------------------<  157<H>  4.1   |  24.0  |  0.63    Ca    9.7      21 Apr 2024 05:25  Mg     1.8     04-19    TPro  6.7  /  Alb  4.0  /  TBili  0.3<L>  /  DBili  x   /  AST  20  /  ALT  18  /  AlkPhos  78  04-19    PT/INR - ( 21 Apr 2024 05:25 )   PT: 10.4 sec;   INR: 0.94 ratio         PTT - ( 21 Apr 2024 05:25 )  PTT:32.1 sec  Urinalysis Basic - ( 21 Apr 2024 05:25 )    Color: x / Appearance: x / SG: x / pH: x  Gluc: 157 mg/dL / Ketone: x  / Bili: x / Urobili: x   Blood: x / Protein: x / Nitrite: x   Leuk Esterase: x / RBC: x / WBC x   Sq Epi: x / Non Sq Epi: x / Bacteria: x        04-21 @ 07:01  -  04-21 @ 10:43  --------------------------------------------------------  IN: 400 mL / OUT: 0 mL / NET: 400 mL        RADIOLOGY & ADDITIONAL TESTS:  < from: MR Brain Stereotactic w/wo IV Cont (04.20.24 @ 03:10) >  IMPRESSION:  3.7 cm TRV by 4.2 cm AP by 4.2 cm cc necrotic enhancing   heterogeneous mass in the posterior RIGHT frontal lobe with moderate   edema is suspicious for a high-grade glial neoplasm. There is mild   effacement of the RIGHT lateral ventricle with 4.6 mm subfalcine   herniation to the LEFT.    < end of copied text >    < from: CT Chest w/ IV Cont (04.20.24 @ 12:01) >  IMPRESSION:  No evidence of a primary malignancy or metastatic disease in the chest,   abdomen or pelvis.    < end of copied text >

## 2024-04-21 NOTE — DISCHARGE NOTE NURSING/CASE MANAGEMENT/SOCIAL WORK - NSDCPEFALRISK_GEN_ALL_CORE
For information on Fall & Injury Prevention, visit: [email or web address] OR  [email or web address] OR  https://www.cdc.gov/steadi/patient.html

## 2024-04-21 NOTE — PROGRESS NOTE ADULT - ASSESSMENT
Assessment:  68F with PMH HTN, HLD, afib on ASA 81mg presents to ED with 2 weeks of nausea and difficulty with fine motor skills. Found to have a right frontal lobe mass on MRI Brain    Plan:  - CT C/A/P w/o evidence of primary malignancy or metastatic disease  - Patient would like to go home and confer with PCP before making a decision regarding neurosurgical treatment  - Ok to dispo from a neurosx standpoint and follow up with Dr. Cruz in 1 week  - Continue decadron 4q6 and Keppra 500 BID  - Discussed with attending  Assessment:  68F with PMH HTN, HLD, afib on ASA 81mg presents to ED with 2 weeks of nausea and difficulty with fine motor skills. Found to have a right frontal lobe mass on MRI Brain    Plan:  - CT C/A/P w/o evidence of primary malignancy or metastatic disease  - Patient would like to go home and confer with PCP before making a decision regarding neurosurgical treatment  - Ok to dispo from a neurosx standpoint and follow up with Dr. Cruz this week  - Continue decadron 4q6 and Keppra 500 BID  - Discussed with attending

## 2024-04-21 NOTE — DISCHARGE NOTE PROVIDER - ATTENDING DISCHARGE PHYSICAL EXAMINATION:
ICU Vital Signs Last 24 Hrs  T(C): 36.7 (21 Apr 2024 11:00), Max: 37 (20 Apr 2024 15:34)  T(F): 98 (21 Apr 2024 11:00), Max: 98.6 (20 Apr 2024 15:34)  HR: 90 (21 Apr 2024 11:00) (81 - 92)  BP: 122/78 (21 Apr 2024 11:00) (103/63 - 141/67)  BP(mean): 76 (21 Apr 2024 04:51) (76 - 98)  ABP: --  ABP(mean): --  RR: 18 (21 Apr 2024 11:00) (18 - 18)  SpO2: 97% (21 Apr 2024 11:00) (92% - 98%)    O2 Parameters below as of 21 Apr 2024 11:00  Patient On (Oxygen Delivery Method): room air      Const: Awake, alert and oriented. In no acute distress.   HEENT: NC/AT. Moist mucous membranes.  Eyes: No scleral icterus. EOMI. no visual deficits, perrl  Neck:. Soft and supple. Full ROM without pain.  Cardiac: Regular rate and regular rhythm. +S1/S2. No murmurs. Peripheral pulses 2+ and symmetric. No LE edema.  Resp: Speaking in full sentences. No evidence of respiratory distress. No wheezes, rales or rhonchi.  Abd: Soft, non-tender, non-distended. Normal bowel sounds. No guarding or rebound.  Back: Spine midline and non-tender. No CVAT.  Neuro: Awake, alert & oriented x 3. Moves all extremities symmetrically. CN II-XII intact, ftn intact,   Ext: 5/5 strength all extremities and sensation intact

## 2024-04-21 NOTE — DISCHARGE NOTE PROVIDER - CARE PROVIDER_API CALL
Merline Cruz  Neurosurgery  [address] [place] [id number]  Phone: [phone]  Fax: [phone]  Follow Up Time:

## 2024-04-21 NOTE — PHYSICAL THERAPY INITIAL EVALUATION ADULT - GENERAL OBSERVATIONS, REHAB EVAL
Pt received seated at edge of bed, bedrails x 4, CBWR, tele, and  intact. Pt agreeable to PT evaluation.

## 2024-04-21 NOTE — PHYSICAL THERAPY INITIAL EVALUATION ADULT - NSPTDISCHREC_GEN_A_CORE
Home no needs. Pt is independent in transfers, ambulation, and stairs. Pt has no need for skilled PT. PT will no longer follow./No skilled PT needs

## 2024-04-21 NOTE — PHYSICAL THERAPY INITIAL EVALUATION ADULT - ADDITIONAL COMMENTS
Pt reports she lives with her  in a ranch style home with 3 YASH with a partition wall to hold onto instead of handrails.  Pts home has 0 interior stairs.  Pt reports that her  is also retired and is willing and able to assist her at all times if needed.    DME: pt owns an old cane that was her mother in laws

## 2024-04-21 NOTE — DISCHARGE NOTE NURSING/CASE MANAGEMENT/SOCIAL WORK - PATIENT PORTAL LINK FT
You can access the FollowMyHealth Patient Portal offered by [facility] by registering at the following website: [email or web address]. By joining [facility]’s FollowMyHealth portal, you will also be able to view your health information using other applications (apps) compatible with our system.

## 2024-04-21 NOTE — PHYSICAL THERAPY INITIAL EVALUATION ADULT - PERTINENT HX OF CURRENT PROBLEM, REHAB EVAL
Dx: pt presented to ED with fine motor complaints in bilateral hands. now with findings of right frontotemporal mass, neuro surgery following.    PMH: Afib on ASA, HTN, HLD

## 2024-04-21 NOTE — DISCHARGE NOTE PROVIDER - HOSPITAL COURSE
68F with PMHX AFIB on ASA, HTN, HLD presents to [facility] ER with fine motor/neuro complaints which began approx 2 weeks ago and worsened since onset admitted for new Frontotemporal Mass with Vasogenic Edema and mild mass effect on R Lateral Ventricle.    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 Cruz  -Discharge on Dexamethasone for 7 days and Keppra  -CT chest and AP did not report and metastatic mass  -Follow up with [facility]    #AFib  -not on anticoagulation due to gi intolerance, pt on aspirin for cva prevention.   -continue diltiazem 180mg q24  -hold ASA 162mg q24  -Monitor Telemetry currently rate controlled    #HLD  - continue atorvastatin and fenofibrate .

## 2024-04-21 NOTE — DISCHARGE NOTE PROVIDER - CARE PROVIDERS DIRECT ADDRESSES
[email or web address]

## 2024-04-21 NOTE — DISCHARGE NOTE PROVIDER - NSDCCPCAREPLAN_GEN_ALL_CORE_FT
PRINCIPAL DISCHARGE DIAGNOSIS  Diagnosis: Brain lesion  Assessment and Plan of Treatment: Follow up with neurosurgeon and oncologist

## 2024-04-21 NOTE — PROGRESS NOTE ADULT - NS ATTEND AMEND GEN_ALL_CORE FT
I agree with the above. I personally examined and saw the patient. Slight left  weakness but otherwise intact. I reviewed her imaging with her. Despite my recommendation for her to stay in the hospital for surgical planning, she would like to go home after testing is completed. I attempted to call her PCP at her request--all offices closed and unable to leave a message for the on-call.
I agree with the above. I personally examined and saw the patient. Exam and plan as above. Patient wishing to go home despite recommendation to stay for operative planning. Outpatient follow up.

## 2024-04-21 NOTE — DISCHARGE NOTE PROVIDER - NSDCMRMEDTOKEN_GEN_ALL_CORE_FT
atorvastatin 20 mg oral tablet: 1 tab(s) orally once a day  dexAMETHasone 4 mg oral tablet: 1 tab(s) orally 4 times a day  Diltia  mg/24 hours oral capsule, extended release: 1 cap(s) orally once a day  fenofibrate 120 mg oral tablet: 1 tab(s) orally once a day  Keppra 500 mg oral tablet: 1 tab(s) orally 2 times a day

## 2024-04-24 PROBLEM — E78.5 HYPERLIPIDEMIA, UNSPECIFIED: Chronic | Status: ACTIVE | Noted: 2024-04-19

## 2024-04-24 PROBLEM — I48.0 PAROXYSMAL ATRIAL FIBRILLATION: Chronic | Status: ACTIVE | Noted: 2024-04-20

## 2024-04-29 ENCOUNTER — APPOINTMENT (OUTPATIENT)
Dept: NEUROSURGERY | Facility: CLINIC | Age: 69
End: 2024-04-29
Payer: MEDICARE

## 2024-04-29 VITALS
SYSTOLIC BLOOD PRESSURE: 144 MMHG | HEART RATE: 59 BPM | BODY MASS INDEX: 27.94 KG/M2 | HEIGHT: 61 IN | DIASTOLIC BLOOD PRESSURE: 78 MMHG | TEMPERATURE: 98 F | OXYGEN SATURATION: 98 % | WEIGHT: 148 LBS

## 2024-04-29 DIAGNOSIS — Z86.79 PERSONAL HISTORY OF OTHER DISEASES OF THE CIRCULATORY SYSTEM: ICD-10-CM

## 2024-04-29 DIAGNOSIS — Z82.49 FAMILY HISTORY OF ISCHEMIC HEART DISEASE AND OTHER DISEASES OF THE CIRCULATORY SYSTEM: ICD-10-CM

## 2024-04-29 DIAGNOSIS — Z78.9 OTHER SPECIFIED HEALTH STATUS: ICD-10-CM

## 2024-04-29 PROCEDURE — ZZZZZ: CPT

## 2024-04-29 PROCEDURE — 99214 OFFICE O/P EST MOD 30 MIN: CPT

## 2024-04-29 RX ORDER — FENOFIBRATE 120 MG/1
120 TABLET ORAL
Refills: 0 | Status: ACTIVE | COMMUNITY

## 2024-04-29 RX ORDER — DEXAMETHASONE 4 MG/1
4 TABLET ORAL
Refills: 0 | Status: ACTIVE | COMMUNITY

## 2024-04-29 RX ORDER — ATORVASTATIN CALCIUM 20 MG/1
20 TABLET, FILM COATED ORAL
Refills: 0 | Status: ACTIVE | COMMUNITY

## 2024-04-30 ENCOUNTER — RESULT REVIEW (OUTPATIENT)
Age: 69
End: 2024-04-30

## 2024-04-30 ENCOUNTER — INPATIENT (INPATIENT)
Facility: HOSPITAL | Age: 69
LOS: 7 days | Discharge: HOME | DRG: 55 | End: 2024-05-08
Attending: NEUROLOGICAL SURGERY | Admitting: NEUROLOGICAL SURGERY
Payer: COMMERCIAL

## 2024-04-30 VITALS
SYSTOLIC BLOOD PRESSURE: 115 MMHG | HEIGHT: 61 IN | RESPIRATION RATE: 17 BRPM | OXYGEN SATURATION: 98 % | WEIGHT: 164.02 LBS | TEMPERATURE: 98 F | HEART RATE: 67 BPM | DIASTOLIC BLOOD PRESSURE: 72 MMHG

## 2024-04-30 DIAGNOSIS — I48.0 PAROXYSMAL ATRIAL FIBRILLATION: ICD-10-CM

## 2024-04-30 DIAGNOSIS — Z01.810 ENCOUNTER FOR PREPROCEDURAL CARDIOVASCULAR EXAMINATION: ICD-10-CM

## 2024-04-30 DIAGNOSIS — C71.9 MALIGNANT NEOPLASM OF BRAIN, UNSPECIFIED: ICD-10-CM

## 2024-04-30 LAB
ANION GAP SERPL CALC-SCNC: 12 MMOL/L — SIGNIFICANT CHANGE UP (ref 5–17)
BUN SERPL-MCNC: 26.5 MG/DL — HIGH (ref 8–20)
CALCIUM SERPL-MCNC: 8.1 MG/DL — LOW (ref 8.4–10.5)
CHLORIDE SERPL-SCNC: 100 MMOL/L — SIGNIFICANT CHANGE UP (ref 96–108)
CO2 SERPL-SCNC: 27 MMOL/L — SIGNIFICANT CHANGE UP (ref 22–29)
CREAT SERPL-MCNC: 1.07 MG/DL — SIGNIFICANT CHANGE UP (ref 0.5–1.3)
EGFR: 56 ML/MIN/1.73M2 — LOW
GLUCOSE BLDC GLUCOMTR-MCNC: 121 MG/DL — HIGH (ref 70–99)
GLUCOSE BLDC GLUCOMTR-MCNC: 124 MG/DL — HIGH (ref 70–99)
GLUCOSE SERPL-MCNC: 113 MG/DL — HIGH (ref 70–99)
HCT VFR BLD CALC: 40.3 % — SIGNIFICANT CHANGE UP (ref 34.5–45)
HGB BLD-MCNC: 13.6 G/DL — SIGNIFICANT CHANGE UP (ref 11.5–15.5)
MAGNESIUM SERPL-MCNC: 2.2 MG/DL — SIGNIFICANT CHANGE UP (ref 1.6–2.6)
MCHC RBC-ENTMCNC: 30.1 PG — SIGNIFICANT CHANGE UP (ref 27–34)
MCHC RBC-ENTMCNC: 33.7 GM/DL — SIGNIFICANT CHANGE UP (ref 32–36)
MCV RBC AUTO: 89.2 FL — SIGNIFICANT CHANGE UP (ref 80–100)
PHOSPHATE SERPL-MCNC: 3.5 MG/DL — SIGNIFICANT CHANGE UP (ref 2.4–4.7)
PLATELET # BLD AUTO: 260 K/UL — SIGNIFICANT CHANGE UP (ref 150–400)
PLATELET RESPONSE ASPIRIN RESULT: 466 ARU — SIGNIFICANT CHANGE UP
POTASSIUM SERPL-MCNC: 4.3 MMOL/L — SIGNIFICANT CHANGE UP (ref 3.5–5.3)
POTASSIUM SERPL-SCNC: 4.3 MMOL/L — SIGNIFICANT CHANGE UP (ref 3.5–5.3)
RBC # BLD: 4.52 M/UL — SIGNIFICANT CHANGE UP (ref 3.8–5.2)
RBC # FLD: 12.8 % — SIGNIFICANT CHANGE UP (ref 10.3–14.5)
SODIUM SERPL-SCNC: 139 MMOL/L — SIGNIFICANT CHANGE UP (ref 135–145)
WBC # BLD: 14.19 K/UL — HIGH (ref 3.8–10.5)
WBC # FLD AUTO: 14.19 K/UL — HIGH (ref 3.8–10.5)

## 2024-04-30 PROCEDURE — 93010 ELECTROCARDIOGRAM REPORT: CPT

## 2024-04-30 PROCEDURE — 99222 1ST HOSP IP/OBS MODERATE 55: CPT | Mod: 25

## 2024-04-30 PROCEDURE — 99223 1ST HOSP IP/OBS HIGH 75: CPT

## 2024-04-30 PROCEDURE — 93306 TTE W/DOPPLER COMPLETE: CPT | Mod: 26

## 2024-04-30 RX ORDER — ATORVASTATIN CALCIUM 80 MG/1
20 TABLET, FILM COATED ORAL AT BEDTIME
Refills: 0 | Status: DISCONTINUED | OUTPATIENT
Start: 2024-04-30 | End: 2024-05-08

## 2024-04-30 RX ORDER — DEXTROSE 10 % IN WATER 10 %
125 INTRAVENOUS SOLUTION INTRAVENOUS ONCE
Refills: 0 | Status: DISCONTINUED | OUTPATIENT
Start: 2024-04-30 | End: 2024-05-03

## 2024-04-30 RX ORDER — ACETAMINOPHEN 500 MG
650 TABLET ORAL EVERY 6 HOURS
Refills: 0 | Status: DISCONTINUED | OUTPATIENT
Start: 2024-04-30 | End: 2024-05-08

## 2024-04-30 RX ORDER — DILTIAZEM HCL 120 MG
180 CAPSULE, EXT RELEASE 24 HR ORAL DAILY
Refills: 0 | Status: DISCONTINUED | OUTPATIENT
Start: 2024-04-30 | End: 2024-05-02

## 2024-04-30 RX ORDER — ENOXAPARIN SODIUM 100 MG/ML
40 INJECTION SUBCUTANEOUS EVERY 24 HOURS
Refills: 0 | Status: DISCONTINUED | OUTPATIENT
Start: 2024-04-30 | End: 2024-05-02

## 2024-04-30 RX ORDER — FENOFIBRATE,MICRONIZED 130 MG
145 CAPSULE ORAL AT BEDTIME
Refills: 0 | Status: DISCONTINUED | OUTPATIENT
Start: 2024-04-30 | End: 2024-05-03

## 2024-04-30 RX ORDER — INFLUENZA VIRUS VACCINE 15; 15; 15; 15 UG/.5ML; UG/.5ML; UG/.5ML; UG/.5ML
0.7 SUSPENSION INTRAMUSCULAR ONCE
Refills: 0 | Status: DISCONTINUED | OUTPATIENT
Start: 2024-04-30 | End: 2024-05-08

## 2024-04-30 RX ORDER — SODIUM CHLORIDE 9 MG/ML
1000 INJECTION, SOLUTION INTRAVENOUS
Refills: 0 | Status: DISCONTINUED | OUTPATIENT
Start: 2024-04-30 | End: 2024-05-03

## 2024-04-30 RX ORDER — LEVETIRACETAM 250 MG/1
500 TABLET, FILM COATED ORAL EVERY 12 HOURS
Refills: 0 | Status: DISCONTINUED | OUTPATIENT
Start: 2024-04-30 | End: 2024-05-08

## 2024-04-30 RX ORDER — SENNA PLUS 8.6 MG/1
2 TABLET ORAL AT BEDTIME
Refills: 0 | Status: DISCONTINUED | OUTPATIENT
Start: 2024-04-30 | End: 2024-05-08

## 2024-04-30 RX ORDER — INSULIN LISPRO 100/ML
VIAL (ML) SUBCUTANEOUS
Refills: 0 | Status: DISCONTINUED | OUTPATIENT
Start: 2024-04-30 | End: 2024-05-08

## 2024-04-30 RX ORDER — POLYETHYLENE GLYCOL 3350 17 G/17G
17 POWDER, FOR SOLUTION ORAL DAILY
Refills: 0 | Status: DISCONTINUED | OUTPATIENT
Start: 2024-04-30 | End: 2024-05-08

## 2024-04-30 RX ORDER — DEXTROSE 50 % IN WATER 50 %
15 SYRINGE (ML) INTRAVENOUS ONCE
Refills: 0 | Status: DISCONTINUED | OUTPATIENT
Start: 2024-04-30 | End: 2024-05-03

## 2024-04-30 RX ORDER — GLUCAGON INJECTION, SOLUTION 0.5 MG/.1ML
1 INJECTION, SOLUTION SUBCUTANEOUS ONCE
Refills: 0 | Status: DISCONTINUED | OUTPATIENT
Start: 2024-04-30 | End: 2024-05-03

## 2024-04-30 RX ORDER — DEXTROSE 50 % IN WATER 50 %
12.5 SYRINGE (ML) INTRAVENOUS ONCE
Refills: 0 | Status: DISCONTINUED | OUTPATIENT
Start: 2024-04-30 | End: 2024-05-08

## 2024-04-30 RX ORDER — PANTOPRAZOLE SODIUM 20 MG/1
40 TABLET, DELAYED RELEASE ORAL
Refills: 0 | Status: DISCONTINUED | OUTPATIENT
Start: 2024-04-30 | End: 2024-05-08

## 2024-04-30 RX ORDER — DEXAMETHASONE 0.5 MG/5ML
4 ELIXIR ORAL EVERY 12 HOURS
Refills: 0 | Status: DISCONTINUED | OUTPATIENT
Start: 2024-04-30 | End: 2024-05-03

## 2024-04-30 RX ORDER — DEXTROSE 50 % IN WATER 50 %
25 SYRINGE (ML) INTRAVENOUS ONCE
Refills: 0 | Status: DISCONTINUED | OUTPATIENT
Start: 2024-04-30 | End: 2024-05-08

## 2024-04-30 RX ADMIN — ATORVASTATIN CALCIUM 20 MILLIGRAM(S): 80 TABLET, FILM COATED ORAL at 21:47

## 2024-04-30 RX ADMIN — Medication 145 MILLIGRAM(S): at 21:47

## 2024-04-30 RX ADMIN — Medication 4 MILLIGRAM(S): at 17:54

## 2024-04-30 RX ADMIN — ENOXAPARIN SODIUM 40 MILLIGRAM(S): 100 INJECTION SUBCUTANEOUS at 21:47

## 2024-04-30 RX ADMIN — LEVETIRACETAM 500 MILLIGRAM(S): 250 TABLET, FILM COATED ORAL at 17:54

## 2024-04-30 NOTE — CONSULT NOTE ADULT - SUBJECTIVE AND OBJECTIVE BOX
[facility]                                              CARDIOLOGY AT [place] [address]                                             Telephone: [phone]. Fax:[phone]                                                       CARDIOLOGY CONSULTATION NOTE                                                                                             History obtained by: Patient and medical record  Community Cardiologist: none, PCP Dr. Stark    obtained: Yes [  ] No [x  ]  Reason for Consultation: Cardiac Risk Stratification   Available out pt records reviewed: Yes [  ] No [  ]    Chief complaint:    Patient is a 68y old  Female who presents with a chief complaint of R frontoparietal brain mass (30 Apr 2024 14:04)      HPI:  68 year-old female with PMHx pAfib on ASA, HTN, HLD presents to [facility] for brain mass resection. Patient initially presented on 4/19/24 for 2 weeks of difficulty with fine motor skills (buttoning shirt, combing hair, holding utensils). CT Head at that time revealed a right frontoparietal mass for which neurosurgery was consulted. Pt was admitted to medicine for mets workup, CT CAP was negative. 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 Has been taking Keppra 500 BID for seizure ppx. Last dose of ASA was 4/28. Reports fine motor skills have been improving and otherwise feels well. Patient scheduled for R brain mass resection on 5/3/24 with Dr. Guaman. Cardiology consulted for cardiac risk stratification. Patient does not follow with cardiologist, Afib is managed by PCP Dr. Stark, used to take AC but was taken off due to "stomach problems", only taking ASA now. She endorses having NST in the past when she saw a cardio at [facility] after Afib diagnosis, but no NSTs since due to poor reaction to isotopes. She endorses chronic SOB but attributes it to her Afib. Denies chest pain, back pain, headache, dizziness, SOB, FUENTES, diaphoresis, syncope or N/V.      CARDIAC TESTING   ECHO: PENDING    STRESS: PENDING    CATH:     ELECTROPHYSIOLOGY:     PAST MEDICAL HISTORY  HLD (hyperlipidemia)  Paroxysmal atrial fibrillation    PAST SURGICAL HISTORY  No significant past surgical history        SOCIAL HISTORY:  Denies smoking/alcohol/drugs  CIGARETTES:     ALCOHOL:  DRUGS:    FAMILY HISTORY:  Family history of breast cancer in mother (Mother)      Family History of Cardiovascular Disease:  Yes [  ] No [ x]  Coronary Artery Disease in first degree relative: Yes [  ] No [x]  Sudden Cardiac Death in First degree relative: Yes [  ] No [ x ]    HOME MEDICATIONS:  atorvastatin 20 mg oral tablet: 1 tab(s) orally once a day (20 Apr 2024 04:06)  Diltia  mg/24 hours oral capsule, extended release: 1 cap(s) orally once a day (20 Apr 2024 04:06)  fenofibrate 120 mg oral tablet: 1 tab(s) orally once a day (20 Apr 2024 04:06)      CURRENT CARDIAC MEDICATIONS:  diltiazem    milliGRAM(s) Oral daily      CURRENT OTHER MEDICATIONS:  acetaminophen     Tablet .. 650 milliGRAM(s) Oral every 6 hours PRN Mild Pain (1 - 3)  levETIRAcetam 500 milliGRAM(s) Oral every 12 hours  pantoprazole    Tablet 40 milliGRAM(s) Oral before breakfast  polyethylene glycol 3350 17 Gram(s) Oral daily  senna 2 Tablet(s) Oral at bedtime  atorvastatin 20 milliGRAM(s) Oral at bedtime  dexAMETHasone     Tablet 4 milliGRAM(s) Oral every 12 hours  dextrose 10% Bolus 125 milliLiter(s) IV Bolus once, Stop order after: 1 Doses  dextrose 5%. 1000 milliLiter(s) (50 mL/Hr) IV Continuous <Continuous>  dextrose 5%. 1000 milliLiter(s) (100 mL/Hr) IV Continuous <Continuous>  dextrose 50% Injectable 12.5 Gram(s) IV Push once, Stop order after: 1 Doses  dextrose 50% Injectable 25 Gram(s) IV Push once, Stop order after: 1 Doses  dextrose Oral Gel 15 Gram(s) Oral once, Stop order after: 1 Doses PRN Blood Glucose LESS THAN 70 milliGRAM(s)/deciliter  enoxaparin Injectable 40 milliGRAM(s) SubCutaneous every 24 hours  glucagon  Injectable 1 milliGRAM(s) IntraMuscular once, Stop order after: 1 Doses  insulin lispro (ADMELOG) corrective regimen sliding scale   SubCutaneous three times a day before meals      ALLERGIES:   oxycodone (Unknown)      REVIEW OF SYMPTOMS:   CONSTITUTIONAL: No fever, no chills, no weight loss, no weight gain, no fatigue   ENMT:  No vertigo; No sinus or throat pain  NECK: No pain or stiffness  CARDIOVASCULAR: No chest pain, no dyspnea, no syncope/presyncope, no palpitations, no dizziness, no Orthopnea, no Paroxsymal nocturnal dyspnea  RESPIRATORY: no Shortness of breath, no cough, no wheezing  : No dysuria, no hematuria   GI: No dark color stool, no nausea, no diarrhea, no constipation, no abdominal pain   NEURO: No headache, no slurred speech   MUSCULOSKELETAL: No joint pain or swelling; No muscle, back, or extremity pain  PSYCH: No agitation, no anxiety.    ALL OTHER REVIEW OF SYSTEMS ARE NEGATIVE.    VITAL SIGNS:  T(C): 36.7 (04-30-24 @ 12:30), Max: 36.8 (04-30-24 @ 08:15)  T(F): 98.1 (04-30-24 @ 12:30), Max: 98.2 (04-30-24 @ 08:15)  HR: 72 (04-30-24 @ 12:30) (67 - 72)  BP: 117/66 (04-30-24 @ 12:30) (115/72 - 117/66)  RR: 17 (04-30-24 @ 12:30) (17 - 17)  SpO2: 98% (04-30-24 @ 12:30) (98% - 98%)    INTAKE AND OUTPUT:       PHYSICAL EXAM:  Constitutional: Comfortable . No acute distress.   HEENT: Atraumatic and normocephalic , neck is supple . no JVD. No carotid bruit.  CNS: A&Ox3. No focal deficits.   Respiratory: CTAB, unlabored   Cardiovascular: RRR normal s1 s2. No murmur. No rubs or gallop.  Gastrointestinal: Soft, non-tender. +Bowel sounds.   Extremities: 2+ Peripheral Pulses, No clubbing, cyanosis, or edema  Psychiatric: Calm . no agitation.   Skin: Warm and dry, no ulcers on extremities     LABS:      INTERPRETATION OF TELEMETRY:     ECG: NSR with PACs, not on tele will repeat EKG in AM  Prior ECG: Yes [  ] No [  ]    RADIOLOGY & ADDITIONAL STUDIES:    X-ray:    CT scan:   MRI:   US:

## 2024-04-30 NOTE — CONSULT NOTE ADULT - SUBJECTIVE AND OBJECTIVE BOX
HPI: 68 year-old female with PMHx Afib on ASA, HTN, HLD presents to [facility] for brain mass resection. Patient initially presented on 4/19/24 for 2 weeks of difficulty with fine motor skills (buttoning shirt, combing hair, holding utensils). CT Head at that time revealed a right frontoparietal mass for which neurosurgery was consulted. Pt was admitted to medicine for mets workup, CT CAP was negative. 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 Patient was sent home on anti-epileptics and steroids. She is now admitted for  R brain mass resection on 5/3/24 with Dr. Guaman.   Patient seen and examined on 4 tower. Offers no complaints. States she is pretty active with walking and doing yard work. Denies getting chest pain or shortness of breath with exertion. Notes she has prediabetes and afib only on aspirin. Has not seen a cardiologist in over 10 years. Her afib has been controlled on Cardizem and aspirin which gets prescribed by her PCP Dr. Stark.      HPI: 68 year-old female with PMHx Afib on ASA, HTN, HLD presents to [facility] for brain mass resection. Patient initially presented on 4/19/24 for 2 weeks of difficulty with fine motor skills (buttoning shirt, combing hair, holding utensils). CT Head at that time revealed a right frontoparietal mass for which neurosurgery was consulted. Pt was admitted to medicine for mets workup, CT CAP was negative. 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 Patient was sent home on anti-epileptics and steroids. She is now admitted for  R brain mass resection on 5/3/24 with Dr. Guaman.   Patient seen and examined on 4 tower. Offers no complaints. States she is pretty active with walking and doing yard work. Denies getting chest pain or shortness of breath with exertion. Notes she has prediabetes and afib only on aspirin. Has not seen a cardiologist in over 10 years. Her afib has been controlled on Cardizem and aspirin which gets prescribed by her PCP Dr. Stark.     REVIEW OF SYSTEMS  General: as per HPI  Skin/Breast: no new rash  Ophthalmologic: no change in vision  ENMT: no dysphagia, throat pain or change in hearing  Respiratory and Thorax: no difficulty breathing or chest pain  Cardiovascular: no palpitations or PND, orthopnea  Gastrointestinal: no abdominal pain, normal bowel movement, no dark stools  Genitourinary: no difficulty urinating, no burning urination  Musculoskeletal: no joint pain  Neurological: as per HPI  Psychiatric: no depression, anxiety  Hematology/Lymphatics: denies easy bruising or bleeding  Endocrine: no polyuria, polydipsia    PMHx: afib, prediabetes  Past Surgical hx: Cataract  Family hx: Mother with breast cancer  allergies: Oxycodone makes her dizzy  home meds: Keppra 500 mg oral tablet: 1 tab(s) orally 2 times a day, Diltia  mg/24 hours oral capsule, extended release: 1 cap(s) orally once a day, atorvastatin 20 mg oral tablet: 1 tab(s) orally once a day, fenofibrate 120 mg oral tablet: 1 tab(s) orally once a day and aspirin daily  social hx: denies tobacco use, and denies alcohol use    PHYSICAL EXAM:  Constitutional: No acute distress, alert and oriented by 3  HEENT: AT/NC, EOMI, PERRLA, Normal conjunctiva, no pharyngeal erythema, moist oral mucosa  Respiratory: CTA BL, equal breath sounds, no crackles or wheezing  Cardiovascular: RRR, no edema  Gastrointestinal: soft, Non-tender, Non-distended + Bowel sounds, no rebound or guarding  Genitourinary: no harp  Musculoskeletal: no joint swelling  Neurological: CN 2-12 grossly intact, no focal deficits  Skin: warm, dry and intact  Psychiatric: normal mood and affect

## 2024-04-30 NOTE — H&P ADULT - NSHPLABSRESULTS_GEN_ALL_CORE
< from: MR Brain Stereotactic w/wo IV Cont (04.20.24 @ 03:10) >    IMPRESSION:  3.7 cm TRV by 4.2 cm AP by 4.2 cm cc necrotic enhancing   heterogeneous mass in the posterior RIGHT frontal lobe with moderate   edema is suspicious for a high-grade glial neoplasm. There is mild   effacement of the RIGHT lateral ventricle with 4.6 mm subfalcine   herniation to the LEFT.    < end of copied text >    < from: CT Abdomen and Pelvis w/ IV Cont (04.20.24 @ 12:01) >    IMPRESSION:  No evidence of a primary malignancy or metastatic disease in the chest,   abdomen or pelvis.    < end of copied text >

## 2024-04-30 NOTE — H&P ADULT - HISTORY OF PRESENT ILLNESS
68 year-old female with PMHx Afib on ASA, HTN, HLD presents to [facility] for brain mass resection. Patient initially presented on 4/19/24 for 2 weeks of difficulty with fine motor skills (buttoning shirt, combing hair, holding utensils). CT Head at that time revealed a right frontoparietal mass for which neurosurgery was consulted. Pt was admitted to medicine for mets workup, CT CAP was negative. 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 Has been taking Keppra 500 BID for seizure ppx. Last dose of ASA was 4/28. Reports fine motor skills have been improving and otherwise feels well. Patient scheduled for R brain mass resection on 5/3/24 with Dr. Guaman.

## 2024-04-30 NOTE — PATIENT PROFILE ADULT - FALL HARM RISK - UNIVERSAL INTERVENTIONS
Bed in lowest position, wheels locked, appropriate side rails in place/Call bell, personal items and telephone in reach/Instruct patient to call for assistance before getting out of bed or chair/Non-slip footwear when patient is out of bed/[place] to call system/Physically safe environment - no spills, clutter or unnecessary equipment/Purposeful Proactive Rounding/Room/bathroom lighting operational, light cord in reach

## 2024-04-30 NOTE — H&P ADULT - NSHPPHYSICALEXAM_GEN_ALL_CORE
PHYSICAL EXAM:  GENERAL: NAD, well-groomed, well-developed  HEAD:  Atraumatic, normocephalic  EYES: Conjunctiva and sclera clears  CHRIS COMA SCORE: E-4 V-5 M-6 = 15  MENTAL STATUS: AAO x3; Awake; Opens eyes spontaneously; Appropriately conversant without aphasia; follows commands  CRANIAL NERVES: Visual fields full to confrontation, PERRL. EOMI without nystagmus. Facial sensation intact V1-3 distribution b/l. Face symmetric w/ normal eye closure and smile, tongue midline. Hearing grossly intact. Speech clear.   MOTOR: strength 5/5 b/l upper and lower extremities  SENSATION: grossly intact to light touch all extremities  COORDINATION: no upper extremity dysmetria  CHEST/LUNG: Non-labored breathing on room air  EXTREMITIES: no clubbing, cyanosis, or edema  SKIN: Warm, dry

## 2024-04-30 NOTE — CONSULT NOTE ADULT - PROBLEM SELECTOR RECOMMENDATION 2
.  - CHADSVASc 2, has not seen cardiology in over 10 years  - hx of pAF  - currently NSR with PACs  - no on AC due to hx of GI issues with AC  - ASA on hold in anticipation of surgery  - recc OP follow up with cardiologist to establish care  - continue diltiazem 180mg PO daily .  - CHADSVASc 2, has not seen cardiology in over 10 years  - hx of pAF  - currently NSR with PACs  - no on AC due to hx of GI issues with AC  - ASA on hold in anticipation of surgery  - recc OP follow up with cardiologist to establish care  - continue diltiazem 180mg PO daily      No further inpatient cardiac work up at this time.  Will sign off.  Please reconsult if needed.

## 2024-04-30 NOTE — CONSULT NOTE ADULT - NS ATTEND AMEND GEN_ALL_CORE FT
Patient seen and examined by me.  Chaperone: Elana Monge NP    T(C): 36.7 (04-30-24 @ 17:51), Max: 36.8 (04-30-24 @ 08:15)  HR: 70 (04-30-24 @ 17:51) (67 - 72)  BP: 127/76 (04-30-24 @ 17:51) (115/72 - 127/76)  RR: 17 (04-30-24 @ 17:51) (17 - 17)  SpO2: 97% (04-30-24 @ 17:51) (97% - 98%)  Patient alert and awake.  Chest- Bilateral Clear BS  Cardiac- S1 and S2  Abdomen- Soft    Assessment/Plan:    I have discussed my recommendation with the PA which are outlined above.  Will sign off

## 2024-04-30 NOTE — CONSULT NOTE ADULT - ASSESSMENT
Patients mom calling with concerns of the patients girlfriend testing positive for chlamydia - patient is not having any signs or symptoms however mom wants to know if the patient should be tested - please call     68 year-old female with PMHx pAfib on ASA, HTN, HLD presents to [facility] for brain mass resection. 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 Has been taking Keppra 500 BID for seizure ppx. Last dose of ASA was 4/28. Cardiology consulted for cardiac risk stratification. Will plan for TTE and exercise stress vs CCTA as patient does not tolerate isotopes for NST well (profound fatigue for days after receiving).

## 2024-04-30 NOTE — PHYSICAL THERAPY INITIAL EVALUATION ADULT - COORDINATION ASSESSED, REHAB EVAL
finger to nose intact on right very slight impairment on left improvement noted over time; heel to shin intact bilaterally/finger to nose/heel to shin

## 2024-04-30 NOTE — PHYSICAL THERAPY INITIAL EVALUATION ADULT - SIT-TO-STAND BALANCE
Pt arrived and began to vomit. Fluids opened and Zofran given. TORB to Dr Medina Michaels. Pt feels dizzy and pt was reclined back in bed. Will give pain pill after pt's nausea/vomiting have passed. 1115 Pt reports her nausea is improved. Her dizziness is resolving. Has not taken more PO yet    1145 Pt still feels dizzy and wants to try and tolerate crackers before she attempts to take the pain pill as she has vomited pills before. 1155 Pt wants to hold on  The pain pill for a moment. Pt not yet ready to urinate. 1300 Pt taken to BR in [facility] and tried to urinate. Had an episode of vomiting. Then resolved. Requests sprite. Discuss POC if unable to urinate. 1330  Dr. Deanne Babin by bedside. Discussed possibility of placing a catheter and a bladder scan. Pt want some more moments before adbancing to that.    1400 Provided more fluids. Bladder scan to be done. Pt adamant that she doesn't want a catheter. Pt states her pain is zero. Pt states she cannot pee because she 'had nothing to drink for 45 minutes'  But patient came over with a drink she finished and has had a gingerale, sprite and a water throught her phase 2 time,.    1549 Pt successfully urinated 100cc pink urine. good balance

## 2024-04-30 NOTE — CONSULT NOTE ADULT - PROBLEM SELECTOR RECOMMENDATION 9
.  Cardiac Risk Stratification for Procedure  - METS >4  - pending resection of brain mass scheduled for   -  hx of pAF currently NSR  - no ischemic changes on EKG, but will repeat in AM  - no ischemic eval in many years, will plan for exercise ST vs CCTA in AM  - TTE is pending  - Risk stratification to be updated pending results of workup above .  Cardiac Risk Stratification for Procedure  - METS >4  - pending resection of brain mass scheduled for 5/3  - hx of pAF currently NSR  - has not seen cardiologist in 10 years  - no ischemic changes on EKG, but will repeat in AM  - no ischemic eval in many years, will plan for exercise ST vs CCTA in AM  - TTE is pending  - Risk stratification to be updated pending results of workup above .  Cardiac Risk Stratification for Procedure  - METS >4  - pending resection of brain mass scheduled for 5/3  - hx of pAF currently NSR  - has not seen cardiologist in 10 years  - no ischemic changes on EKG, but will repeat in AM  - TTE is pending  - Risk stratification to be updated pending results of workup above but if TTE without significant abnormality then No active chest pain, evidence of ischemia, decompensated CHF, significant valvular abnormality, or unstable arrhythmia then therefore no absolute cardiac contraindication to the planned procedure.  No further cardiac work up is needed.     Further cardiac work up will not change risk of the patient. Proceed for procedure as indicated.  Patient's risk profile is generated using RCRI which is applicable for any procedure/surgery except cardiac surgery.

## 2024-04-30 NOTE — H&P ADULT - ASSESSMENT
Assessment:  68 year-old female with PMHx Afib on ASA, HTN, HLD presents to [facility] for brain mass resection. Known to NSGY service after initially presenting on 4/19/24 for 2 weeks of difficulty with fine motor skills. 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 Patient scheduled for R brain mass resection on 5/3/24 with Dr. Guaman.    Plan:  - q4h neuro checks  - MR Brain w/wo stereotactic with fiducials  - Tumor embolization 5/2  - OR for brain mass resection 5/3  - Obtain medical clearance for planned procedures   - Decadron 4mg BID for edema  - Keppra 500 BID for seizure ppx  - Normotensive  - Continue home Cardizem, Atorvastatin  - Hold ASA  - DVT ppx: SCDs, Lovenox   - Discussed case and plan with Dr. Guaman

## 2024-04-30 NOTE — CONSULT NOTE ADULT - ASSESSMENT
68 year-old female with PMHx Afib on ASA and HLD presents to [facility] for brain mass resection.     Brain mass   Plan per Neurosx  - Tumor embolization 5/2  - OR for brain mass resection 5/3  - Cont with keppra and steroids per Neuro sx  - dvt ppx: Per NeuroSx  - Will need EKG, TTE, cards eval and echo prior to medical clearance    Afib only on asa  CHADSVASc 2  - asa on hold for upcoming procedures  - EKG  - TTE  - cont cardizem   - Cards consult please    HLD  - resume statin    Leukocytosis likely from steroids  - no signs or symptoms of infection  - will monitor.     DVT ppx: Per Neuro Sx    Medical optimzation pending TTE,  EKG and cards eval    68 year-old female with PMHx Afib on ASA and HLD presents to [facility] for brain mass resection.     Brain mass   Plan per Neurosx  - Tumor embolization 5/2  - OR for brain mass resection 5/3  - Cont with keppra and steroids per Neuro sx  - dvt ppx: Per NeuroSx  - Will need EKG, TTE, cards eval and echo prior to medical clearance.     Afib only on asa  CHADSVASc 2, has not seen cardiology in over 10 years  - asa on hold for upcoming procedures  - EKG  - TTE  - cont cardizem   - Cards consult please fro cardiac risk stratification prior to OR.      Prediabetes  - check a1c  - ISS  - monitor blood glucose closely as she is on steroids    HLD  - resume statin    Leukocytosis likely from steroids  - no signs or symptoms of infection  - will monitor.     DVT ppx: Per Neuro Sx    Medical optimization pending TTE,  EKG and cards eval.  Discussed with NeuroSurgery

## 2024-05-01 ENCOUNTER — APPOINTMENT (OUTPATIENT)
Dept: NEUROSURGERY | Facility: HOSPITAL | Age: 69
End: 2024-05-01

## 2024-05-01 LAB
A1C WITH ESTIMATED AVERAGE GLUCOSE RESULT: 6.5 % — HIGH (ref 4–5.6)
ESTIMATED AVERAGE GLUCOSE: 140 MG/DL — HIGH (ref 68–114)
GLUCOSE BLDC GLUCOMTR-MCNC: 127 MG/DL — HIGH (ref 70–99)
GLUCOSE BLDC GLUCOMTR-MCNC: 164 MG/DL — HIGH (ref 70–99)
GLUCOSE BLDC GLUCOMTR-MCNC: 175 MG/DL — HIGH (ref 70–99)
GLUCOSE BLDC GLUCOMTR-MCNC: 176 MG/DL — HIGH (ref 70–99)

## 2024-05-01 PROCEDURE — 99223 1ST HOSP IP/OBS HIGH 75: CPT

## 2024-05-01 PROCEDURE — 99232 SBSQ HOSP IP/OBS MODERATE 35: CPT

## 2024-05-01 PROCEDURE — 93010 ELECTROCARDIOGRAM REPORT: CPT

## 2024-05-01 RX ADMIN — PANTOPRAZOLE SODIUM 40 MILLIGRAM(S): 20 TABLET, DELAYED RELEASE ORAL at 05:30

## 2024-05-01 RX ADMIN — Medication 4 MILLIGRAM(S): at 17:38

## 2024-05-01 RX ADMIN — Medication 180 MILLIGRAM(S): at 05:29

## 2024-05-01 RX ADMIN — ATORVASTATIN CALCIUM 20 MILLIGRAM(S): 80 TABLET, FILM COATED ORAL at 22:03

## 2024-05-01 RX ADMIN — Medication 4 MILLIGRAM(S): at 05:30

## 2024-05-01 RX ADMIN — ENOXAPARIN SODIUM 40 MILLIGRAM(S): 100 INJECTION SUBCUTANEOUS at 22:03

## 2024-05-01 RX ADMIN — LEVETIRACETAM 500 MILLIGRAM(S): 250 TABLET, FILM COATED ORAL at 17:38

## 2024-05-01 RX ADMIN — Medication 145 MILLIGRAM(S): at 22:03

## 2024-05-01 RX ADMIN — LEVETIRACETAM 500 MILLIGRAM(S): 250 TABLET, FILM COATED ORAL at 05:30

## 2024-05-01 RX ADMIN — Medication 2: at 17:38

## 2024-05-01 RX ADMIN — Medication 2: at 11:57

## 2024-05-01 NOTE — PROGRESS NOTE ADULT - SUBJECTIVE AND OBJECTIVE BOX
HPI: 68 year-old female with PMHx Afib on ASA, HTN, HLD presents to [facility] for brain mass resection. Patient initially presented on 4/19/24 for 2 weeks of difficulty with fine motor skills (buttoning shirt, combing hair, holding utensils). CT Head at that time revealed a right frontoparietal mass for which neurosurgery was consulted. Pt was admitted to medicine for mets workup, CT CAP was negative. 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 Patient has been taking Keppra 500 BID for seizure ppx. Last dose of ASA was Sunday 4/28.     Interval History: Patient reports fine motor skills have been improving and otherwise feels well. Patient scheduled for R brain mass resection on 5/3/24 with Dr. Guaman.     Vital Signs Last 24 Hrs  T(C): 36.5 (01 May 2024 07:50), Max: 36.7 (30 Apr 2024 17:51)  T(F): 97.7 (01 May 2024 07:50), Max: 98.1 (30 Apr 2024 17:51)  HR: 73 (01 May 2024 07:50) (62 - 73)  BP: 111/65 (01 May 2024 07:50) (105/64 - 127/76)  RR: 18 (01 May 2024 07:50) (16 - 18)  SpO2: 95% (01 May 2024 07:50) (94% - 97%)    Parameters below as of 01 May 2024 07:50  Patient On (Oxygen Delivery Method): room air    Physical Exam:  General: NAD, NCAT, resting comfortably in bed  Lungs: respirations unlabored on RA  Neuro: AOX4, speech is clear and fluent, PERRL, EOMI, LE 5/5 throughout, sensation and strength intact distally, no focal deficits appreciated     IMAGING:  < from: CT Chest w/ IV Cont (04.20.24 @ 12:01) >    ACC: [id number] EXAM:  CT ABDOMEN AND PELVIS IC   ORDERED BY: KRISTOFER MOREJON     ACC: [id number] EXAM:  CT CHEST IC   ORDERED BY: KRISTOFER MOREJON     PROCEDURE DATE:  04/20/2024      < end of copied text >    < from: CT Chest w/ IV Cont (04.20.24 @ 12:01) >    IMPRESSION:  No evidence of a primary malignancy or metastatic disease in the chest,   abdomen or pelvis.      --- End of Report ---    BISHOP QUICK MD; Attending Radiologist  This document has been electronically signed. Apr 20 2024  2:05PM    < end of copied text >      < from: MR Brain Stereotactic w/wo IV Cont (04.20.24 @ 03:10) >    ACC: [id number] EXAM:  MR BRAIN WAW IC FOR SRS   ORDERED BY: KELSEY PEDRO     PROCEDURE DATE:  04/20/2024      < end of copied text >  < from: MR Brain Stereotactic w/wo IV Cont (04.20.24 @ 03:10) >  IMPRESSION:  3.7 cm TRV by 4.2 cm AP by 4.2 cm cc necrotic enhancing   heterogeneous mass in the posterior RIGHT frontal lobe with moderate   edema is suspicious for a high-grade glial neoplasm. There is mild   effacement of the RIGHT lateral ventricle with 4.6 mm subfalcine   herniation to the LEFT.    --- End of Report ---      LAWRENCE AMBROSIO MD; Attending Radiologist  This document has been electronically signed. Apr 20 2024  8:55PM    < end of copied text >    < from: CT Head No Cont (04.19.24 @ 21:01) >  ACC: [id number] EXAM:  CT ANGIO NECK (W)AW IC   ORDERED BY: BILLIE NOVOA     ACC: [id number] EXAM:  CT ANGIO BRAIN (W)AW IC   ORDERED BY: BILLIE NOVOA     ACC: [id number] EXAM:  CT BRAIN   ORDERED BY: BILLIE NOVOA     PROCEDURE DATE:  04/19/2024      < end of copied text >  < from: CT Head No Cont (04.19.24 @ 21:01) >  IMPRESSION:    CT head: Right frontoparietal vasogenic edema secondary to underlying   enhancing cystic mass. Abscess is in the differential. This can be   further characterized with contrast-enhanced brain MRI.    CT ANGIOGRAPHY NECK: Patent cervical vasculature.  No hemodynamically   significant carotid stenosis or flow-limiting vertebral artery stenosis.    No evidence of dissection.    CT ANGIOGRAPHY BRAIN: No vessel occlusion, flow-limiting stenosis or   aneurysm.    --- End of Report ---      VIRGIL JOINER MD; Attending Radiologist  This document has been electronically signed. Apr 19 2024  9:22PM    < end of copied text >      LABs:                      13.6   14.19 )-----------( 260      ( 30 Apr 2024 19:56 )             40.3       04-30    139  |  100  |  26.5<H>  ----------------------------<  113<H>  4.3   |  27.0  |  1.07    Ca    8.1<L>      30 Apr 2024 19:56  Phos  3.5     04-30  Mg     2.2     04-30          Urinalysis Basic - ( 30 Apr 2024 19:56 )  Color: x / Appearance: x / SG: x / pH: x  Gluc: 113 mg/dL / Ketone: x  / Bili: x / Urobili: x   Blood: x / Protein: x / Nitrite: x   Leuk Esterase: x / RBC: x / WBC x   Sq Epi: x / Non Sq Epi: x / Bacteria: x      CAPILLARY BLOOD GLUCOSE  POCT Blood Glucose.: 175 mg/dL (01 May 2024 11:48)

## 2024-05-01 NOTE — PROGRESS NOTE ADULT - ASSESSMENT
68 year-old female with PMHx Afib on ASA and HLD presents to [facility] for brain mass resection.     Brain mass   - Tumor embolization 5/2  - OR for brain mass resection 5/3  - Cont with keppra and steroids per Neuro sx  - dvt ppx: Per NeuroSx  - EKG NSR, no st and ST changes, TTE with low normal LV function with EF of 50 to 55%, cards eval done  Patient denies Hx of exertional dysnea or chest pain, Patient's METS Score is >3  and RCRI is 2, patient labs, EKG and Echo reviewed, patient is intermediate risk for perioperative cardiovascular complication and is optimized from the medicine point of view for planned procedure.        Afib only on asa  CHADSVASc 2, has not seen cardiology in over 10 years  - asa on hold for upcoming procedures  - cont cardizem     Diabetes: a1c is 6.5  - ISS  - monitor blood glucose closely as she is on steroids  -dietician consult     HLD  - resume statin    Leukocytosis likely from steroids  - no signs or symptoms of infection  - will monitor.     DVT ppx: Per Neuro Sx    Medicine team is signing off, please call as needed

## 2024-05-01 NOTE — CHART NOTE - NSCHARTNOTEFT_GEN_A_CORE
Cardiac Risk Stratification for Procedure  - METS >4  - RCRI Risk Stratification: Class I  Risk, 3.9% Risk of Major Cardiac Event  - pending resection of brain mass scheduled for 5/3  - hx of pAF currently NSR  - has not seen cardiologist in 10 years  - no ischemic changes on EKG  - TTE EF 50-55%, no RWMA  -  No active chest pain, evidence of ischemia, decompensated CHF, significant valvular abnormality, or unstable arrhythmia then therefore no absolute cardiac contraindication to the planned procedure.  No further cardiac work up is needed.     Further cardiac work up will not change risk of the patient. Proceed for procedure as indicated.  Patient's risk profile is generated using RCRI which is applicable for any procedure/surgery except cardiac surgery.      No further inpatient cardiac work up at this time.  Will sign off.  Please reconsult if needed.

## 2024-05-01 NOTE — PROGRESS NOTE ADULT - SUBJECTIVE AND OBJECTIVE BOX
MANUEL GARSIA    [id number]    68y      Female    Patient is a 68y old  Female who presents with a chief complaint of R frontoparietal brain mass (01 May 2024 07:49)      INTERVAL HPI/OVERNIGHT EVENTS:    patient is doing ok, has no chest pain, sob, dizziness    Vital Signs Last 24 Hrs  T(C): 36.5 (01 May 2024 07:50), Max: 36.7 (30 Apr 2024 12:30)  T(F): 97.7 (01 May 2024 07:50), Max: 98.1 (30 Apr 2024 12:30)  HR: 73 (01 May 2024 07:50) (62 - 73)  BP: 111/65 (01 May 2024 07:50) (105/64 - 127/76)  RR: 18 (01 May 2024 07:50) (16 - 18)  SpO2: 95% (01 May 2024 07:50) (94% - 98%)    Parameters below as of 01 May 2024 07:50  Patient On (Oxygen Delivery Method): room air        PHYSICAL EXAM:    GENERAL: Elderly female looking comfortable   HEENT: PERRL, +EOMI  NECK: soft, Supple, No JVD  CHEST/LUNG: Clear to auscultate bilaterally; No wheezing  HEART: S1S2+, Regular rate and rhythm; No murmurs  ABDOMEN: Soft, Nontender, Nondistended; Bowel sounds present  EXTREMITIES:  1+ Peripheral Pulses, No edema  SKIN: No rashes or lesions  NEURO: AAOX3  PSYCH: normal mood      LABS:                        13.6   14.19 )-----------( 260      ( 30 Apr 2024 19:56 )             40.3     04-30    139  |  100  |  26.5<H>  ----------------------------<  113<H>  4.3   |  27.0  |  1.07    Ca    8.1<L>      30 Apr 2024 19:56  Phos  3.5     04-30  Mg     2.2     04-30        I&O's Summary    30 Apr 2024 07:01  -  01 May 2024 07:00  --------------------------------------------------------  IN: 240 mL / OUT: 0 mL / NET: 240 mL        MEDICATIONS  (STANDING):  atorvastatin 20 milliGRAM(s) Oral at bedtime  dexAMETHasone     Tablet 4 milliGRAM(s) Oral every 12 hours  dextrose 10% Bolus 125 milliLiter(s) IV Bolus once  dextrose 5%. 1000 milliLiter(s) (100 mL/Hr) IV Continuous <Continuous>  dextrose 5%. 1000 milliLiter(s) (50 mL/Hr) IV Continuous <Continuous>  dextrose 50% Injectable 25 Gram(s) IV Push once  dextrose 50% Injectable 12.5 Gram(s) IV Push once  diltiazem    milliGRAM(s) Oral daily  enoxaparin Injectable 40 milliGRAM(s) SubCutaneous every 24 hours  fenofibrate Tablet 145 milliGRAM(s) Oral at bedtime  glucagon  Injectable 1 milliGRAM(s) IntraMuscular once  influenza  Vaccine (HIGH DOSE) 0.7 milliLiter(s) IntraMuscular once  insulin lispro (ADMELOG) corrective regimen sliding scale   SubCutaneous three times a day before meals  levETIRAcetam 500 milliGRAM(s) Oral every 12 hours  pantoprazole    Tablet 40 milliGRAM(s) Oral before breakfast  polyethylene glycol 3350 17 Gram(s) Oral daily  senna 2 Tablet(s) Oral at bedtime    MEDICATIONS  (PRN):  acetaminophen     Tablet .. 650 milliGRAM(s) Oral every 6 hours PRN Mild Pain (1 - 3)  dextrose Oral Gel 15 Gram(s) Oral once PRN Blood Glucose LESS THAN 70 milliGRAM(s)/deciliter

## 2024-05-01 NOTE — PROGRESS NOTE ADULT - ASSESSMENT
Assessment:  68 year-old female with PMHx Afib on ASA, HTN, HLD presents to [facility] for brain mass resection. Known to NSGY service after initially presenting on 4/19/24 for 2 weeks of difficulty with fine motor skills. 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 Patient scheduled for R brain mass resection on 5/3/24 with Dr. Guaman.    Plan:  - q4h neuro checks  - MR Brain w/wo stereotactic with fiducials to be ordered tomorrow  - Tumor embolization 5/2  - OR for brain mass resection 5/3  - Obtain medical clearance for planned procedures   - Decadron 4mg BID for edema  - Keppra 500 BID for seizure ppx  - Normotensive  - Continue home Cardizem, Atorvastatin  - Hold ASA  - DVT ppx: SCDs, Lovenox   - Discussed case and plan with Dr. Guaman Assessment:  68 year-old female with PMHx Afib on ASA, HTN, HLD presents to [facility] for brain mass resection. Known to NSGY service after initially presenting on 4/19/24 for 2 weeks of difficulty with fine motor skills. 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 Patient scheduled for R brain mass resection on 5/3/24 with Dr. Guaman.    Plan:  - q4h neuro checks  - MR Brain w/wo stereotactic with fiducials to be ordered tomorrow  - Tumor embolization 5/2  - OR for brain mass resection 5/3  - Obtain medical clearance for planned procedures   - Decadron 4mg BID for edema  - Keppra 500 BID for seizure ppx  - Normotensive  - Continue home Cardizem, Atorvastatin  - Hold ASA  - Repeat ARU ordered with pre-op labs tomorrow  - NPO except meds after MN  - DVT ppx: SCDs, Lovenox   - Discussed case and plan with Dr. Guaman

## 2024-05-01 NOTE — CONSULT NOTE ADULT - SUBJECTIVE AND OBJECTIVE BOX
68yF was admitted on 04-30 with complaints of fine motor hand coordination impairments. Workup showed a right brain mass and now is pending resection on 5/3/2024.     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     ----------------------------  Patient feels at baseline.  Continues to report fine motor challenges.  Provided with exercises.       VITALS  T(C): 36.6 (05-01-24 @ 04:32), Max: 36.8 (04-30-24 @ 08:15)  HR: 70 (05-01-24 @ 04:32) (62 - 72)  BP: 112/61 (05-01-24 @ 04:32) (105/64 - 127/76)  RR: 17 (05-01-24 @ 04:32) (16 - 17)  SpO2: 97% (05-01-24 @ 04:32) (94% - 98%)  Wt(kg): --    PAST MEDICAL & SURGICAL HISTORY  HLD (hyperlipidemia)    Paroxysmal atrial fibrillation    No significant past surgical history         RECENT LABS REVIEWED    CBC Full  -  ( 30 Apr 2024 19:56 )  WBC Count : 14.19 K/uL  RBC Count : 4.52 M/uL  Hemoglobin : 13.6 g/dL  Hematocrit : 40.3 %  Platelet Count - Automated : 260 K/uL  Mean Cell Volume : 89.2 fl  Mean Cell Hemoglobin : 30.1 pg  Mean Cell Hemoglobin Concentration : 33.7 gm/dL  Auto Neutrophil # : x  Auto Lymphocyte # : x  Auto Monocyte # : x  Auto Eosinophil # : x  Auto Basophil # : x  Auto Neutrophil % : x  Auto Lymphocyte % : x  Auto Monocyte % : x  Auto Eosinophil % : x  Auto Basophil % : x    04-30    139  |  100  |  26.5<H>  ----------------------------<  113<H>  4.3   |  27.0  |  1.07    Ca    8.1<L>      30 Apr 2024 19:56  Phos  3.5     04-30  Mg     2.2     04-30      Urinalysis Basic - ( 30 Apr 2024 19:56 )    Color: x / Appearance: x / SG: x / pH: x  Gluc: 113 mg/dL / Ketone: x  / Bili: x / Urobili: x   Blood: x / Protein: x / Nitrite: x   Leuk Esterase: x / RBC: x / WBC x   Sq Epi: x / Non Sq Epi: x / Bacteria: x        ALLERGIES  oxycodone (Unknown)      MEDICATIONS   acetaminophen     Tablet .. 650 milliGRAM(s) Oral every 6 hours PRN  atorvastatin 20 milliGRAM(s) Oral at bedtime  dexAMETHasone     Tablet 4 milliGRAM(s) Oral every 12 hours  dextrose 10% Bolus 125 milliLiter(s) IV Bolus once  dextrose 5%. 1000 milliLiter(s) IV Continuous <Continuous>  dextrose 5%. 1000 milliLiter(s) IV Continuous <Continuous>  dextrose 50% Injectable 25 Gram(s) IV Push once  dextrose 50% Injectable 12.5 Gram(s) IV Push once  dextrose Oral Gel 15 Gram(s) Oral once PRN  diltiazem    milliGRAM(s) Oral daily  enoxaparin Injectable 40 milliGRAM(s) SubCutaneous every 24 hours  fenofibrate Tablet 145 milliGRAM(s) Oral at bedtime  glucagon  Injectable 1 milliGRAM(s) IntraMuscular once  influenza  Vaccine (HIGH DOSE) 0.7 milliLiter(s) IntraMuscular once  insulin lispro (ADMELOG) corrective regimen sliding scale   SubCutaneous three times a day before meals  levETIRAcetam 500 milliGRAM(s) Oral every 12 hours  pantoprazole    Tablet 40 milliGRAM(s) Oral before breakfast  polyethylene glycol 3350 17 Gram(s) Oral daily  senna 2 Tablet(s) Oral at bedtime      ----------------------------------------------------------------------------------------  FUNCTIONAL HISTORY  Lives with spouse, 2 YASH  Independent    FUNCTIONAL STATUS/PROGRESS  4/30 PT  Independent    ----------------------------------------------------------------------------------------  PHYSICAL EXAM  Constitutional - NAD, Comfortable  HEENT - NCAT, EOMI  Neck - Supple, No limited ROM  Chest - Breathing comfortably, No wheezing  Cardiovascular - S1S2   Abdomen - Soft   Extremities - No C/C/E, No calf tenderness   Neurologic Exam -                    Cognitive - AAO to self, place, date, year, situation     Communication - Fluent, No dysarthria     Cranial Nerves - CN 2-12 intact     FUNCTIONAL MOTOR EXAM - No focal deficits - Has some sense of fine motor impairments when tested                    LEFT    UE - ShAB 5/5, EF 5/5, EE 5/5, WE 5/5,  5/5                    RIGHT UE - ShAB 5/5, EF 5/5, EE 5/5, WE 5/5,  5/5                    LEFT    LE - HF 5/5, KE 5/5, DF 5/5, PF 5/5                    RIGHT LE - HF 5/5, KE 5/5, DF 5/5, PF 5/5        Sensory - Intact to LT   Psychiatric - Mood stable, Affect WNL  ----------------------------------------------------------------------------------------  ASSESSMENT/PLAN  68yFemale with functional deficits related to right brain mass   Right Frontal Mass pending resection - Keppra, Decadron  CAD - Lipitor, Tricor  HTn - Cardizem  Pain - Tylenol  DVT PPX - SCDs, Lovenox  Rehab/Impaired mobility and function - Patient continues to require hospitalization for the above diagnoses and ongoing active management of comorbid complications (pending resection) that are substantially impairing functional ability and impairing quality of life.     RECOMMEND - Mobilize throughout the day, Exercises in bed (demonstrated UE/LE)     Will continue to follow.  Recommend ongoing mobilization by staff to maintain cardiopulmonary function and prevention of secondary complications related to debility/immobility. Discussed the specific management and recommendations above with rehab clinical care team/rehab liaison.      Total Time Spent on Encounter (reviewing clinical notes, labs, radiology, medications, patient history/exam, assessment and plan) - 75 minutes

## 2024-05-02 ENCOUNTER — TRANSCRIPTION ENCOUNTER (OUTPATIENT)
Age: 69
End: 2024-05-02

## 2024-05-02 LAB
ABO RH CONFIRMATION: SIGNIFICANT CHANGE UP
ALBUMIN SERPL ELPH-MCNC: 3.8 G/DL — SIGNIFICANT CHANGE UP (ref 3.3–5.2)
ALP SERPL-CCNC: 76 U/L — SIGNIFICANT CHANGE UP (ref 40–120)
ALT FLD-CCNC: 31 U/L — SIGNIFICANT CHANGE UP
ANION GAP SERPL CALC-SCNC: 10 MMOL/L — SIGNIFICANT CHANGE UP (ref 5–17)
APTT BLD: 34.7 SEC — SIGNIFICANT CHANGE UP (ref 24.5–35.6)
AST SERPL-CCNC: 13 U/L — SIGNIFICANT CHANGE UP
BILIRUB SERPL-MCNC: 0.5 MG/DL — SIGNIFICANT CHANGE UP (ref 0.4–2)
BLD GP AB SCN SERPL QL: SIGNIFICANT CHANGE UP
BUN SERPL-MCNC: 25.5 MG/DL — HIGH (ref 8–20)
CALCIUM SERPL-MCNC: 8.9 MG/DL — SIGNIFICANT CHANGE UP (ref 8.4–10.5)
CHLORIDE SERPL-SCNC: 101 MMOL/L — SIGNIFICANT CHANGE UP (ref 96–108)
CO2 SERPL-SCNC: 27 MMOL/L — SIGNIFICANT CHANGE UP (ref 22–29)
CREAT SERPL-MCNC: 0.66 MG/DL — SIGNIFICANT CHANGE UP (ref 0.5–1.3)
EGFR: 95 ML/MIN/1.73M2 — SIGNIFICANT CHANGE UP
GLUCOSE BLDC GLUCOMTR-MCNC: 124 MG/DL — HIGH (ref 70–99)
GLUCOSE BLDC GLUCOMTR-MCNC: 125 MG/DL — HIGH (ref 70–99)
GLUCOSE BLDC GLUCOMTR-MCNC: 131 MG/DL — HIGH (ref 70–99)
GLUCOSE SERPL-MCNC: 157 MG/DL — HIGH (ref 70–99)
HCT VFR BLD CALC: 40.6 % — SIGNIFICANT CHANGE UP (ref 34.5–45)
HGB BLD-MCNC: 14 G/DL — SIGNIFICANT CHANGE UP (ref 11.5–15.5)
INR BLD: 0.87 RATIO — SIGNIFICANT CHANGE UP (ref 0.85–1.18)
MAGNESIUM SERPL-MCNC: 2.3 MG/DL — SIGNIFICANT CHANGE UP (ref 1.8–2.6)
MCHC RBC-ENTMCNC: 30.1 PG — SIGNIFICANT CHANGE UP (ref 27–34)
MCHC RBC-ENTMCNC: 34.5 GM/DL — SIGNIFICANT CHANGE UP (ref 32–36)
MCV RBC AUTO: 87.3 FL — SIGNIFICANT CHANGE UP (ref 80–100)
MRSA PCR RESULT.: SIGNIFICANT CHANGE UP
PHOSPHATE SERPL-MCNC: 3.3 MG/DL — SIGNIFICANT CHANGE UP (ref 2.4–4.7)
PLATELET # BLD AUTO: 251 K/UL — SIGNIFICANT CHANGE UP (ref 150–400)
PLATELET RESPONSE ASPIRIN RESULT: 611 ARU — SIGNIFICANT CHANGE UP
POTASSIUM SERPL-MCNC: 4.4 MMOL/L — SIGNIFICANT CHANGE UP (ref 3.5–5.3)
POTASSIUM SERPL-SCNC: 4.4 MMOL/L — SIGNIFICANT CHANGE UP (ref 3.5–5.3)
PROT SERPL-MCNC: 6.2 G/DL — LOW (ref 6.6–8.7)
PROTHROM AB SERPL-ACNC: 9.7 SEC — SIGNIFICANT CHANGE UP (ref 9.5–13)
RBC # BLD: 4.65 M/UL — SIGNIFICANT CHANGE UP (ref 3.8–5.2)
RBC # FLD: 12.7 % — SIGNIFICANT CHANGE UP (ref 10.3–14.5)
S AUREUS DNA NOSE QL NAA+PROBE: SIGNIFICANT CHANGE UP
SODIUM SERPL-SCNC: 138 MMOL/L — SIGNIFICANT CHANGE UP (ref 135–145)
WBC # BLD: 22.46 K/UL — HIGH (ref 3.8–10.5)
WBC # FLD AUTO: 22.46 K/UL — HIGH (ref 3.8–10.5)

## 2024-05-02 PROCEDURE — 99221 1ST HOSP IP/OBS SF/LOW 40: CPT

## 2024-05-02 PROCEDURE — 70553 MRI BRAIN STEM W/O & W/DYE: CPT | Mod: 26

## 2024-05-02 PROCEDURE — 36226 PLACE CATH VERTEBRAL ART: CPT | Mod: 50

## 2024-05-02 PROCEDURE — 99233 SBSQ HOSP IP/OBS HIGH 50: CPT | Mod: GC

## 2024-05-02 PROCEDURE — 36227 PLACE CATH XTRNL CAROTID: CPT | Mod: 50

## 2024-05-02 PROCEDURE — 36224 PLACE CATH CAROTD ART: CPT | Mod: 50

## 2024-05-02 PROCEDURE — 99232 SBSQ HOSP IP/OBS MODERATE 35: CPT | Mod: 25

## 2024-05-02 PROCEDURE — 93970 EXTREMITY STUDY: CPT | Mod: 26

## 2024-05-02 RX ORDER — SODIUM CHLORIDE 9 MG/ML
1000 INJECTION INTRAMUSCULAR; INTRAVENOUS; SUBCUTANEOUS
Refills: 0 | Status: DISCONTINUED | OUTPATIENT
Start: 2024-05-02 | End: 2024-05-02

## 2024-05-02 RX ORDER — CEFAZOLIN SODIUM 1 G
2000 VIAL (EA) INJECTION ONCE
Refills: 0 | Status: DISCONTINUED | OUTPATIENT
Start: 2024-05-02 | End: 2024-05-02

## 2024-05-02 RX ORDER — CHLORHEXIDINE GLUCONATE 213 G/1000ML
1 SOLUTION TOPICAL ONCE
Refills: 0 | Status: DISCONTINUED | OUTPATIENT
Start: 2024-05-02 | End: 2024-05-02

## 2024-05-02 RX ORDER — SODIUM CHLORIDE 9 MG/ML
1000 INJECTION INTRAMUSCULAR; INTRAVENOUS; SUBCUTANEOUS
Refills: 0 | Status: DISCONTINUED | OUTPATIENT
Start: 2024-05-02 | End: 2024-05-05

## 2024-05-02 RX ORDER — DILTIAZEM HCL 120 MG
180 CAPSULE, EXT RELEASE 24 HR ORAL DAILY
Refills: 0 | Status: DISCONTINUED | OUTPATIENT
Start: 2024-05-02 | End: 2024-05-08

## 2024-05-02 RX ORDER — CEFAZOLIN SODIUM 1 G
2000 VIAL (EA) INJECTION ONCE
Refills: 0 | Status: COMPLETED | OUTPATIENT
Start: 2024-05-03 | End: 2024-05-06

## 2024-05-02 RX ORDER — AMINOLEVULINIC ACID HYDROCHLORIDE 1500 MG/1
1490 POWDER, FOR SOLUTION ORAL ONCE
Refills: 0 | Status: COMPLETED | OUTPATIENT
Start: 2024-05-03 | End: 2024-05-03

## 2024-05-02 RX ADMIN — ATORVASTATIN CALCIUM 20 MILLIGRAM(S): 80 TABLET, FILM COATED ORAL at 22:09

## 2024-05-02 RX ADMIN — LEVETIRACETAM 500 MILLIGRAM(S): 250 TABLET, FILM COATED ORAL at 06:14

## 2024-05-02 RX ADMIN — LEVETIRACETAM 500 MILLIGRAM(S): 250 TABLET, FILM COATED ORAL at 20:25

## 2024-05-02 RX ADMIN — Medication 180 MILLIGRAM(S): at 06:14

## 2024-05-02 RX ADMIN — Medication 145 MILLIGRAM(S): at 22:09

## 2024-05-02 RX ADMIN — Medication 4 MILLIGRAM(S): at 06:14

## 2024-05-02 RX ADMIN — PANTOPRAZOLE SODIUM 40 MILLIGRAM(S): 20 TABLET, DELAYED RELEASE ORAL at 06:14

## 2024-05-02 RX ADMIN — SENNA PLUS 2 TABLET(S): 8.6 TABLET ORAL at 22:09

## 2024-05-02 NOTE — CHART NOTE - NSCHARTNOTEFT_GEN_A_CORE
Nurse notified that while the patient was napping she became bradycardic to HR 45. Upon awaking the patient, HR was 54, and after speaking with the patient HR was 64. Patient is completely asymptomatic; denies chest pain, dizziness, headache, lightheadedness, or nausea. Patient received Cardizem at 5am this morning. Neuro exam is stable, AAO x 3, intact. No intervention required at this time.

## 2024-05-02 NOTE — CONSULT NOTE ADULT - SUBJECTIVE AND OBJECTIVE BOX
Patient is a 68y old  Female who presents with a chief complaint of R frontoparietal brain mass (02 May 2024 12:27)    HPI:  68 year-old female with PMHx Afib on ASA, HTN, HLD presents to [facility] for brain mass resection. Patient initially presented on 4/19/24 for 2 weeks of difficulty with fine motor skills (buttoning shirt, combing hair, holding utensils). CT Head at that time revealed a right frontoparietal mass for which neurosurgery was consulted. Pt was admitted to medicine for mets workup, CT CAP was negative. 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 Has been taking Keppra 500 BID for seizure ppx. Last dose of ASA was 4/28. Reports fine motor skills have been improving and otherwise feels well. Patient scheduled for R brain mass resection on 5/3/24 with Dr. Guaman.  (30 Apr 2024 12:29)    Today, she underwent R frontal/parietal tumor embolization with neuro IR. Admitted to NSICU for post op cares with planned tumor resection tomorrow 5/3.       PAST MEDICAL & SURGICAL HISTORY:  HLD (hyperlipidemia)  Paroxysmal atrial fibrillation  No significant past surgical history      FAMILY HISTORY:  Family history of breast cancer in mother (Mother)      Allergies  oxycodone (Unknown)      REVIEW OF SYSTEMS  Negative except as noted in HPI  CONSTITUTIONAL: No fever, weight loss, or fatigue  EYES: No eye pain, visual disturbances, or discharge  ENMT:  No difficulty hearing, tinnitus, vertigo; No sinus or throat pain  NECK: No pain or stiffness  RESPIRATORY: No cough, wheezing, chills or hemoptysis; No shortness of breath  CARDIOVASCULAR: No chest pain, palpitations, dizziness, or leg swelling  GASTROINTESTINAL: No abdominal or epigastric pain. No nausea, vomiting  GENITOURINARY: No dysuria, frequency, hematuria, or incontinence  NEUROLOGICAL: No headaches, memory loss, loss of strength, numbness, or tremors  MUSCULOSKELETAL: No joint pain or swelling; No muscle, back, or extremity pain      HOME MEDICATIONS:  Home Medications:  atorvastatin 20 mg oral tablet: 1 tab(s) orally once a day (20 Apr 2024 04:06)  Diltia  mg/24 hours oral capsule, extended release: 1 cap(s) orally once a day (20 Apr 2024 04:06)  fenofibrate 120 mg oral tablet: 1 tab(s) orally once a day (20 Apr 2024 04:06)      MEDICATIONS:  Antibiotics:    Neuro:  acetaminophen     Tablet .. 650 milliGRAM(s) Oral every 6 hours PRN  levETIRAcetam 500 milliGRAM(s) Oral every 12 hours    Anticoagulation:    OTHER:  atorvastatin 20 milliGRAM(s) Oral at bedtime  dexAMETHasone     Tablet 4 milliGRAM(s) Oral every 12 hours  dextrose 50% Injectable 25 Gram(s) IV Push once  dextrose 50% Injectable 12.5 Gram(s) IV Push once  dextrose Oral Gel 15 Gram(s) Oral once PRN  diltiazem    milliGRAM(s) Oral daily  fenofibrate Tablet 145 milliGRAM(s) Oral at bedtime  glucagon  Injectable 1 milliGRAM(s) IntraMuscular once  influenza  Vaccine (HIGH DOSE) 0.7 milliLiter(s) IntraMuscular once  insulin lispro (ADMELOG) corrective regimen sliding scale   SubCutaneous three times a day before meals  pantoprazole    Tablet 40 milliGRAM(s) Oral before breakfast  polyethylene glycol 3350 17 Gram(s) Oral daily  senna 2 Tablet(s) Oral at bedtime    IVF:  dextrose 10% Bolus 125 milliLiter(s) IV Bolus once  dextrose 5%. 1000 milliLiter(s) IV Continuous <Continuous>  dextrose 5%. 1000 milliLiter(s) IV Continuous <Continuous>  sodium chloride 0.9%. 1000 milliLiter(s) IV Continuous <Continuous>      Vital Signs Last 24 Hrs  T(C): 36.5 (02 May 2024 17:06), Max: 36.9 (02 May 2024 00:00)  T(F): 97.7 (02 May 2024 17:06), Max: 98.5 (02 May 2024 00:00)  HR: 56 (02 May 2024 17:06) (45 - 82)  BP: 120/78 (02 May 2024 14:29) (115/69 - 123/66)  RR: 16 (02 May 2024 17:06) (16 - 18)  SpO2: 98% (02 May 2024 17:06) (95% - 98%)    Parameters below as of 02 May 2024 17:06  Patient On (Oxygen Delivery Method): room air      Physical Exam:  Constitutional: NAD, lying in bed  Neuro  * Mental Status:  GCS 15: Awake, alert, oriented to conversation. No aphasia or difficulty speaking. No dysarthria.  * Cranial Nerves: Cnii-Cnxii grossly intact. PERRL, EOMI, tongue midline, no gaze deviation  * Motor: RUE 5/5, LUE 5/5, RLE 5/5, LLE 5/5, no drift or dysmetria  * Sensory: Sensation intact to light touch  * Reflexes: not assessed   Cardiovascular: Regular rate and rhythm.  Eyes: See neurologic examination with detailed examination of eyes.  ENT: No JVD, Trachea Midline  Respiratory: non labored breathing   Gastrointestinal: Soft, nontender, nondistended.  Genitourinary: [ x ] Robb, [ ] No Robb.   Musculoskeletal: No muscle wasting noted, (See neurologic assessment for full muscle strength assessment)   Skin:  no wounds, no redness, no abrasions noted  Hematologic / Lymph / Immunologic: No bleeding from IV sites or wounds, No lymphadenopathy, No Hives or allergic type skin lesions      LABS:                        14.0   22.46 )-----------( 251      ( 02 May 2024 04:38 )             40.6     05-02    138  |  101  |  25.5<H>  ----------------------------<  157<H>  4.4   |  27.0  |  0.66    Ca    8.9      02 May 2024 04:38  Phos  3.3     05-02  Mg     2.3     05-02    TPro  6.2<L>  /  Alb  3.8  /  TBili  0.5  /  DBili  x   /  AST  13  /  ALT  31  /  AlkPhos  76  05-02    PT/INR - ( 02 May 2024 04:38 )   PT: 9.7 sec;   INR: 0.87 ratio    PTT - ( 02 May 2024 04:38 )  PTT:34.7 sec      RADIOLOGY & ADDITIONAL STUDIES:  < from: MR Brain Stereotactic w/wo IV Cont (05.02.24 @ 11:37) >  IMPRESSION: Thereis a 3.9 x 3.9 x 3.3 cm heterogeneously enhancing,   centrally necrotic mass in the right parietal lobe suspicious for   malignancy. The lesion demonstrates foci of susceptibility artifact   suggesting hemorrhage. There is extensive surrounding T2/FLAIR   hyperintensity suggesting vasogenic edema and/or nonenhancing tumor.   Right-to-left midline shift measuring 3 mm.    --- End of Report ---  DAY SALAZAR MD; Attending Radiologist  This document has been electronically signed. May  2 [id number]:05PM    < end of copied text >    < from: US Duplex Venous Lower Ext Complete, Bilateral (05.02.24 @ 15:56) >  IMPRESSION:  No evidence of deep venous thrombosis in either lower extremity.    --- End of Report ---  NIELS ASIF MD; Attending Radiologist  This document has been electronically signed. May  2 37111:26PM    < end of copied text >   Patient is a 68y old  Female who presents with a chief complaint of R frontoparietal brain mass (02 May 2024 12:27)    HPI:  68 year-old female with PMHx Afib on ASA, HTN, HLD presents to [facility] for brain mass resection. Patient initially presented on 4/19/24 for 2 weeks of difficulty with fine motor skills (buttoning shirt, combing hair, holding utensils). CT Head at that time revealed a right frontoparietal mass for which neurosurgery was consulted. Pt was admitted to medicine for mets workup, CT CAP was negative. 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 Has been taking Keppra 500 BID for seizure ppx. Last dose of ASA was 4/28. Reports fine motor skills have been improving and otherwise feels well. Patient scheduled for R brain mass resection on 5/3/24 with Dr. Guaman.  (30 Apr 2024 12:29)    Today, she underwent cerebral angiogram which showed R posterior frontal tumor with filling from RMCA, no embolization was performed. Admitted to NSICU for post op cares with planned tumor resection tomorrow 5/3.       PAST MEDICAL & SURGICAL HISTORY:  HLD (hyperlipidemia)  Paroxysmal atrial fibrillation  No significant past surgical history      FAMILY HISTORY:  Family history of breast cancer in mother (Mother)      Allergies  oxycodone (Unknown)      REVIEW OF SYSTEMS  Negative except as noted in HPI  CONSTITUTIONAL: No fever, weight loss, or fatigue  EYES: No eye pain, visual disturbances, or discharge  ENMT:  No difficulty hearing, tinnitus, vertigo; No sinus or throat pain  NECK: No pain or stiffness  RESPIRATORY: No cough, wheezing, chills or hemoptysis; No shortness of breath  CARDIOVASCULAR: No chest pain, palpitations, dizziness, or leg swelling  GASTROINTESTINAL: No abdominal or epigastric pain. No nausea, vomiting  GENITOURINARY: No dysuria, frequency, hematuria, or incontinence  NEUROLOGICAL: No headaches, memory loss, loss of strength, numbness, or tremors  MUSCULOSKELETAL: No joint pain or swelling; No muscle, back, or extremity pain      HOME MEDICATIONS:  Home Medications:  atorvastatin 20 mg oral tablet: 1 tab(s) orally once a day (20 Apr 2024 04:06)  Diltia  mg/24 hours oral capsule, extended release: 1 cap(s) orally once a day (20 Apr 2024 04:06)  fenofibrate 120 mg oral tablet: 1 tab(s) orally once a day (20 Apr 2024 04:06)      MEDICATIONS:  Antibiotics:    Neuro:  acetaminophen     Tablet .. 650 milliGRAM(s) Oral every 6 hours PRN  levETIRAcetam 500 milliGRAM(s) Oral every 12 hours    Anticoagulation:    OTHER:  atorvastatin 20 milliGRAM(s) Oral at bedtime  dexAMETHasone     Tablet 4 milliGRAM(s) Oral every 12 hours  dextrose 50% Injectable 25 Gram(s) IV Push once  dextrose 50% Injectable 12.5 Gram(s) IV Push once  dextrose Oral Gel 15 Gram(s) Oral once PRN  diltiazem    milliGRAM(s) Oral daily  fenofibrate Tablet 145 milliGRAM(s) Oral at bedtime  glucagon  Injectable 1 milliGRAM(s) IntraMuscular once  influenza  Vaccine (HIGH DOSE) 0.7 milliLiter(s) IntraMuscular once  insulin lispro (ADMELOG) corrective regimen sliding scale   SubCutaneous three times a day before meals  pantoprazole    Tablet 40 milliGRAM(s) Oral before breakfast  polyethylene glycol 3350 17 Gram(s) Oral daily  senna 2 Tablet(s) Oral at bedtime    IVF:  dextrose 10% Bolus 125 milliLiter(s) IV Bolus once  dextrose 5%. 1000 milliLiter(s) IV Continuous <Continuous>  dextrose 5%. 1000 milliLiter(s) IV Continuous <Continuous>  sodium chloride 0.9%. 1000 milliLiter(s) IV Continuous <Continuous>      Vital Signs Last 24 Hrs  T(C): 36.5 (02 May 2024 17:06), Max: 36.9 (02 May 2024 00:00)  T(F): 97.7 (02 May 2024 17:06), Max: 98.5 (02 May 2024 00:00)  HR: 56 (02 May 2024 17:06) (45 - 82)  BP: 120/78 (02 May 2024 14:29) (115/69 - 123/66)  RR: 16 (02 May 2024 17:06) (16 - 18)  SpO2: 98% (02 May 2024 17:06) (95% - 98%)    Parameters below as of 02 May 2024 17:06  Patient On (Oxygen Delivery Method): room air      Physical Exam:  Constitutional: NAD, lying in bed  Neuro  * Mental Status:  GCS 15: Awake, alert, oriented to conversation. No aphasia or difficulty speaking. No dysarthria.  * Cranial Nerves: Cnii-Cnxii grossly intact. PERRL, EOMI, tongue midline, no gaze deviation  * Motor: RUE 5/5, LUE 5/5, RLE 5/5, LLE 5/5, no drift or dysmetria  * Sensory: Sensation intact to light touch  * Reflexes: not assessed   Cardiovascular: Regular rate and rhythm.  Eyes: See neurologic examination with detailed examination of eyes.  ENT: No JVD, Trachea Midline  Respiratory: non labored breathing   Gastrointestinal: Soft, nontender, nondistended.  Genitourinary: [ x ] Robb, [ ] No Robb.   Musculoskeletal: No muscle wasting noted, (See neurologic assessment for full muscle strength assessment)   Skin:  no wounds, no redness, no abrasions noted  Hematologic / Lymph / Immunologic: No bleeding from IV sites or wounds, No lymphadenopathy, No Hives or allergic type skin lesions      LABS:                        14.0   22.46 )-----------( 251      ( 02 May 2024 04:38 )             40.6     05-02    138  |  101  |  25.5<H>  ----------------------------<  157<H>  4.4   |  27.0  |  0.66    Ca    8.9      02 May 2024 04:38  Phos  3.3     05-02  Mg     2.3     05-02    TPro  6.2<L>  /  Alb  3.8  /  TBili  0.5  /  DBili  x   /  AST  13  /  ALT  31  /  AlkPhos  76  05-02    PT/INR - ( 02 May 2024 04:38 )   PT: 9.7 sec;   INR: 0.87 ratio    PTT - ( 02 May 2024 04:38 )  PTT:34.7 sec      RADIOLOGY & ADDITIONAL STUDIES:  < from: MR Brain Stereotactic w/wo IV Cont (05.02.24 @ 11:37) >  IMPRESSION: Thereis a 3.9 x 3.9 x 3.3 cm heterogeneously enhancing,   centrally necrotic mass in the right parietal lobe suspicious for   malignancy. The lesion demonstrates foci of susceptibility artifact   suggesting hemorrhage. There is extensive surrounding T2/FLAIR   hyperintensity suggesting vasogenic edema and/or nonenhancing tumor.   Right-to-left midline shift measuring 3 mm.    --- End of Report ---  DAY SALAZAR MD; Attending Radiologist  This document has been electronically signed. May  2 [id number]:05PM    < end of copied text >    < from: US Duplex Venous Lower Ext Complete, Bilateral (05.02.24 @ 15:56) >  IMPRESSION:  No evidence of deep venous thrombosis in either lower extremity.    --- End of Report ---  NIELS ASIF MD; Attending Radiologist  This document has been electronically signed. May  2 82960:26PM    < end of copied text >

## 2024-05-02 NOTE — CONSULT NOTE ADULT - ASSESSMENT
Assessment:  68 year-old female with PMHx Afib on ASA, HTN, HLD presented for R posterior frontal mass, now s/p tumor embo with neuro IR and admitted to neuro ICU for post op cares and preparation for OR in am.       Plan:  Neuro:  - Q1 hour Neuro checks, Q1 hour Vitals  - HOB 30 degrees, Neck midline position  - Maintain normothermia, PO acetaminophen for temp>38 C or pain  - CTH in am   - Continue AED: keppra 500 BID  - Monitor groin  - Steroids: dex 4q6  - Pain management & Sedation: tylenol PRN   - Turn and Position Q2  /  Activity ad carina, with assistance  - 5-ALA in am in preparation for OR (7 am)  	  CV:  - SBP Goal   - BP regimen: diltiazem 180 daily  - Arterial line  - Atorvastatin 20, fenofibrate 145     Pulm:  - Supplemental O2 PRN to maintain Spo2>92%  - Chest PT, OOB, Pulmonary Toilet    GI:  - Nutrition: NPO @ Mn for OR in am   - GI prophylaxis: protonix while on steroids  - Bowel regimen: senna, miralax  - Last BM: 5/1   	  Gu:  - Robb   - Fluids: NS @ 70   - I&O Q1 hour  - Monitor Electrolytes & Renal Function    Heme:  - Monitor H&H  - Chemical DVT prophylaxis: * Chemical DVT prophylaxis is contraindicated due to risk of bleeding  - Mechanical DVT Prophylaxis: Maintain B/L LE sequential compression devices  	  ID:  - Monitor WBC and Temperature  		  Endo  - Monitor BGL, maintain <180  - MISS   Assessment:  68 year-old female with PMHx Afib on ASA, HTN, HLD presented for R posterior frontal mass, now s/p cerebral angiogram and admitted to neuro ICU for post op cares and preparation for OR in am.       Plan:  Neuro:  - Q1 hour Neuro checks, Q1 hour Vitals  - HOB 30 degrees, Neck midline position  - Maintain normothermia, PO acetaminophen for temp>38 C or pain  - Continue AED: keppra 500 BID  - Monitor groin  - Steroids: dex 4q12  - Pain management & Sedation: tylenol PRN   - Turn and Position Q2  /  Activity ad carina, with assistance  - 5-ALA in am in preparation for OR (7 am)  	  CV:  - SBP Goal   - BP regimen: diltiazem 180 daily  - Arterial line  - Atorvastatin 20, fenofibrate 145     Pulm:  - Supplemental O2 PRN to maintain Spo2>92%  - Chest PT, OOB, Pulmonary Toilet    GI:  - Nutrition: NPO @ Mn for OR in am   - GI prophylaxis: protonix while on steroids  - Bowel regimen: senna, miralax  - Last BM: 5/1   	  Gu:  - Robb   - Fluids: NS @ 70   - I&O Q1 hour  - Monitor Electrolytes & Renal Function    Heme:  - Monitor H&H  - Chemical DVT prophylaxis: * Chemical DVT prophylaxis is contraindicated due to risk of bleeding  - Mechanical DVT Prophylaxis: Maintain B/L LE sequential compression devices  	  ID:  - Monitor WBC and Temperature  		  Endo  - Monitor BGL, maintain <180  - MISS

## 2024-05-02 NOTE — PROGRESS NOTE ADULT - SUBJECTIVE AND OBJECTIVE BOX
68 year-old female with PMHx Afib on ASA, HTN, HLD presents to [facility] for brain mass resection. Patient initially presented on 4/19/24 for 2 weeks of difficulty with fine motor skills (buttoning shirt, combing hair, holding utensils). CT Head at that time revealed a right frontoparietal mass for which neurosurgery was consulted. Pt was admitted to medicine for mets workup, CT CAP was negative. 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 Has been taking Keppra 500 BID for seizure ppx.    No further workup needed to proceed to OR. Please have 2 units of pRBCs on hold.      Mitul Wilson MD

## 2024-05-02 NOTE — PROGRESS NOTE ADULT - ASSESSMENT
68 year-old female with PMHx Afib on ASA, HTN, HLD presents to [facility] for brain mass resection. Known to NSGY service after initially presenting on 4/19/24 for 2 weeks of difficulty with fine motor skills. 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 Patient scheduled for R brain mass resection on 5/3/24 with Dr. Guaman.    Pt for cerebral angiography with tumor embolization today with neuro IR (Dr. Mansfield).   Pre oped for tomorrow (5/3) for tumor resection with Dr. Guaman.   Med and cardiac clearance appreciated.   Pending baseline dopplers and mrsa/mssa swabs.   AM   Fiducials placed.  NPO after mid night, hold PM lovenox today.   Pt surgically stable at this time.     Case and plan discussed with Dr. Guaman.  68 year-old female with PMHx Afib on ASA, HTN, HLD presents to [facility] for brain mass resection. Known to NSGY service after initially presenting on 4/19/24 for 2 weeks of difficulty with fine motor skills. 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 Patient scheduled for R brain mass resection on 5/3/24 with Dr. Guaman.    Plan:  - Q4 neuro checks  - 5/3 AM   - MRI Brain stereotactic w/wo pending for OR tomorrow 5/3; fiducials placed today  - Pt for cerebral angiography with tumor embolization today with neuro IR (Dr. Mansfield)  - Pre-oped for tomorrow (5/3) for tumor resection with Dr. Guaman  - Medicine and cardiac clearances appreciated  - Pending baseline LE dopplers and mrsa/mssa swabs for OR tomorrow  - NPO after midnight, hold PM lovenox tonight  - Pt surgically stable at this time  - Case and plan discussed with Dr. Guaman

## 2024-05-02 NOTE — CONSULT NOTE ADULT - TIME BILLING
68 year-old female with R frontal lobe brain mass with vasogenic edema/mass effect.  S/p elective cerebral angiogram which showed R posterior frontal tumor with filling from RMCA, no embolization was performed.   PMH Afib on ASA, HTN, HLD.    Plan:  neurochecks  ALA administration tonight   Continue AED: keppra 500 BID  Dexa 4q12, cont  pain control, avoid oversedation  SBP Goal , cont Diltiazem 180 daily for rate control  cont Atorvastatin 20, check lipid panel  NPO for OR in am  BM regimen, PPI for ppx  Monitor Electrolytes & Renal Function  SCDs, hold anti-thrombotics bautista-op    Time spent review of relevant history, clinical examination, review of data and images, discussion of treatment with the multidisciplinary team and any consultants involved in this patient’s care.

## 2024-05-02 NOTE — CHART NOTE - NSCHARTNOTEFT_GEN_A_CORE
Neurointerventional Surgery  Pre-Procedure Note     This is a 68y ____ hand dominant Female    HPI:  68 year-old female with PMHx Afib on ASA, HTN, HLD presents to [facility] for brain mass resection. Patient initially presented on 4/19/24 for 2 weeks of difficulty with fine motor skills (buttoning shirt, combing hair, holding utensils). CT Head at that time revealed a right frontoparietal mass for which neurosurgery was consulted. Pt was admitted to medicine for mets workup, CT CAP was negative. 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 Has been taking Keppra 500 BID for seizure ppx. Last dose of ASA was 4/28. Reports fine motor skills have been improving and otherwise feels well. Patient scheduled for R brain mass resection on 5/3/24 with Dr. Guaman.  (30 Apr 2024 12:29)      Allergies: oxycodone (Unknown)      PMH/PSH:  PAST MEDICAL & SURGICAL HISTORY:  HLD (hyperlipidemia)  Paroxysmal atrial fibrillation  No significant past surgical history    FAMILY HISTORY:  Family history of breast cancer in mother (Mother)    Current Medications:   acetaminophen     Tablet .. 650 milliGRAM(s) Oral every 6 hours PRN  atorvastatin 20 milliGRAM(s) Oral at bedtime  dexAMETHasone     Tablet 4 milliGRAM(s) Oral every 12 hours  dextrose 10% Bolus 125 milliLiter(s) IV Bolus once  dextrose 5%. 1000 milliLiter(s) IV Continuous <Continuous>  dextrose 5%. 1000 milliLiter(s) IV Continuous <Continuous>  dextrose 50% Injectable 25 Gram(s) IV Push once  dextrose 50% Injectable 12.5 Gram(s) IV Push once  dextrose Oral Gel 15 Gram(s) Oral once PRN  diltiazem    milliGRAM(s) Oral daily  enoxaparin Injectable 40 milliGRAM(s) SubCutaneous every 24 hours  fenofibrate Tablet 145 milliGRAM(s) Oral at bedtime  glucagon  Injectable 1 milliGRAM(s) IntraMuscular once  influenza  Vaccine (HIGH DOSE) 0.7 milliLiter(s) IntraMuscular once  insulin lispro (ADMELOG) corrective regimen sliding scale   SubCutaneous three times a day before meals  levETIRAcetam 500 milliGRAM(s) Oral every 12 hours  pantoprazole    Tablet 40 milliGRAM(s) Oral before breakfast  polyethylene glycol 3350 17 Gram(s) Oral daily  senna 2 Tablet(s) Oral at bedtime      Physical Exam:  Constitutional: NAD, lying in bed  Neuro  * Mental Status:  GCS 15: Awake, alert, oriented to conversation. No aphasia or difficulty speaking. No dysarthria. Able to name objects and their function.  * Cranial Nerves: Cnii-Cnxii grossly intact. PERRL, EOMI, tongue midline, no gaze deviation  * Motor: RUE 5/5, LUE 5/5, RLE 5/5, LLE 5/5, no drift or dysmetria  * Sensory: Sensation intact to light touch  * Reflexes: not assessed   Cardiovascular: Regular rate and rhythm.  Eyes: See neurologic examination with detailed examination of eyes.  ENT: No JVD, Trachea Midline  Respiratory: non labored breathing   Gastrointestinal: Soft, nontender, nondistended.  Genitourinary: [ ] Robb, [ x ] No Robb.   Musculoskeletal: No muscle wasting noted, (See neurologic assessment for full muscle strength assessment)   Skin:  no wounds, no redness, no abrasions noted  Hematologic / Lymph / Immunologic: No bleeding from IV sites or wounds    NIH SS:  DATE: 5/2/24  TIME:  1A: Level of consciousness (0-3): 0  1B: Questions (0-2): 0    1C: Commands (0-2): 0  2: Gaze (0-2): 0  3: Visual fields (0-3): 0  4: Facial palsy (0-3): 0  MOTOR:  5A: Left arm motor drift (0-4): 0  5B: Right arm motor drift (0-4): 0  6A: Left leg motor drift (0-4): 0  6B: Right leg motor drift (0-4): 0  7: Limb ataxia (0-2): 0  SENSORY:  8: Sensation (0-2): 0  SPEECH:  9: Language (0-3): 0  10: Dysarthria (0-2): 0  EXTINCTION:  11: Extinction/inattention (0-2): 0    TOTAL SCORE:     Labs:                         14.0   22.46 )-----------( 251      ( 02 May 2024 04:38 )             40.6       05-02    138  |  101  |  25.5<H>  ----------------------------<  157<H>  4.4   |  27.0  |  0.66    Ca    8.9      02 May 2024 04:38  Phos  3.3     05-02  Mg     2.3     05-02    TPro  6.2<L>  /  Alb  3.8  /  TBili  0.5  /  DBili  x   /  AST  13  /  ALT  31  /  AlkPhos  76  05-02    PT/INR - ( 02 May 2024 04:38 )   PT: 9.7 sec;   INR: 0.87 ratio    PTT - ( 02 May 2024 04:38 )  PTT:34.7 sec    ARU:611     Assessment/Plan:   This is a 68y  year old  Female  presents with 3.7 cm x 4.2 cm x 4.2 cm necrotic enhancing heterogeneous mass in the posterior right frontal lobe. Patient presents to neuro-IR for selective cerebral angiography with tumor embolization.     Procedure, goals, risks, benefits and alternatives  were discussed with patient and (patient's family).  All questions were answered.  Risks include but are not limited to stroke, vessel injury, hemorrhage, and or groin hematoma.  Patient demonstrates understanding  of all risks involved with this procedure and wishes to continue.   Appropriate  consent was obtained from patient and consent is in the patient's chart.

## 2024-05-02 NOTE — PROGRESS NOTE ADULT - ATTENDING COMMENTS
Patient seen and examined, discussed patient with Dr. Thompson and agree with recommendations.      Rehab/Impaired mobility and function - Patient continues to require hospitalization for the above diagnoses and ongoing active management of comorbid complications (pending resection/angio) that are substantially impairing functional ability and impairing quality of life.     RECOMMEND - Mobilize throughout the day, Exercises in bed (demonstrated UE/LE)     Will continue to follow.  Recommend ongoing mobilization by staff to maintain cardiopulmonary function and prevention of secondary complications related to debility/immobility. Discussed the specific management and recommendations above with rehab clinical care team/rehab liaison.

## 2024-05-02 NOTE — CHART NOTE - NSCHARTNOTEFT_GEN_A_CORE
Neurointerventional Surgery Post Procedure Note    Procedure: Selective Cerebral Angiography     Pre- Procedure Diagnosis: R posterior frontal tumor  Post- Procedure Diagnosis: R posterior frontal tumor with filling from the distal R MCA branches, no embolization      : Dr. Donal CHAMORRO  Physician Assistant: Clemencia Mckeon PA-C  Nurse: González Sommer  Anesthesiologist: Dr. Chay Koehler                    Radiology Tech: Chloe Fuller     Sheath:  5 [place] Sheath    I/Os: estimated blood loss less than 10cc,  IV fluids 200cc, Urine output 400cc, Contrast: Omnipaque 240 60  cc, ancef 2g    Vitals:  /58  HR 57  Spo2 100 %    Preliminary Report:  Under a 5 [place] BMX 81 sheath via the right groin under general anesthesia via left vertebral artery, left internal carotid artery, left external carotid artery, right vertebral artery, right internal carotid artery, right external carotid artery, a selective cerebral angiography  was performed and reveals R posterior frontal tumor with filling from the distal R MCA branches, no embolization. ( Official note to follow).    Patient tolerated procedure well.  Patient remains hemodynamically stable, no change in neurological status compared to baseline.  Results were discussed with patient, patient's family and Neurosurgery.  Right groin sheath  was discontinued using Vascade closure device at 1909. Hemostasis was obtained with approximately 11 minutes of manual compression.     No active bleeding, no hematoma, no ecchymosis.   Quick clot and safeguard balloon dressing applied at 1920.  Patient transferred to neuro ICU in stable condition.

## 2024-05-02 NOTE — PROGRESS NOTE ADULT - SUBJECTIVE AND OBJECTIVE BOX
HPI:  HPI:  68 year-old female with PMHx Afib on ASA, HTN, HLD presents to [facility] for brain mass resection. CT Head at that time revealed a right frontoparietal mass for which neurosurgery was consulted.  No over night events. Pt sitting comfortably in bed. Denies headache, extremity tingling/numbness.    Vital Signs Last 24 Hrs  T(C): 36.6 (02 May 2024 07:46), Max: 36.9 (01 May 2024 15:53)  T(F): 97.9 (02 May 2024 07:46), Max: 98.5 (01 May 2024 15:53)  HR: 55 (02 May 2024 07:46) (55 - 82)  BP: 115/69 (02 May 2024 07:46) (115/69 - 142/57)  BP(mean): 90 (01 May 2024 13:30) (90 - 90)  RR: 18 (02 May 2024 07:46) (18 - 18)  SpO2: 98% (02 May 2024 07:46) (95% - 98%)    Parameters below as of 02 May 2024 07:46  Patient On (Oxygen Delivery Method): room air    PHYSICAL EXAM:  GENERAL: NAD, well-groomed  HEAD:  Atraumatic, normocephalic  CHRIS COMA SCORE: E4- V5- M6- =15  MENTAL STATUS: AAO x3; Awake/Comatose; Opens eyes spontaneously, appropriately conversant without aphasia. following simple commands.  CRANIAL NERVES: PERRL. EOMI without nystagmus. Facial sensation intact V1-3 distribution b/l. Face symmetric w/ normal eye closure and smile, tongue midline. Speech clear.   REFLEXES: PERRL.   MOTOR: strength 5/5 b/l upper and lower extremities  SENSATION: grossly intact to light touch all extremities  COORDINATION: Gait no visualized    LABS:                        14.0   22.46 )-----------( 251      ( 02 May 2024 04:38 )             40.6     05-02    138  |  101  |  25.5<H>  ----------------------------<  157<H>  4.4   |  27.0  |  0.66    Ca    8.9      02 May 2024 04:38  Phos  3.3     05-02  Mg     2.3     05-02    TPro  6.2<L>  /  Alb  3.8  /  TBili  0.5  /  DBili  x   /  AST  13  /  ALT  31  /  AlkPhos  76  05-02  PT/INR - ( 02 May 2024 04:38 )   PT: 9.7 sec;   INR: 0.87 ratio    PTT - ( 02 May 2024 04:38 )  PTT:34.7 sec  Urinalysis Basic - ( 02 May 2024 04:38 )  Color: x / Appearance: x / SG: x / pH: x  Gluc: 157 mg/dL / Ketone: x  / Bili: x / Urobili: x   Blood: x / Protein: x / Nitrite: x   Leuk Esterase: x / RBC: x / WBC x   Sq Epi: x / Non Sq Epi: x / Bacteria: x    05-01 @ 07:01  -  05-02 @ 07:00  --------------------------------------------------------  IN: 400 mL / OUT: 0 mL / NET: 400 mL    RADIOLOGY & ADDITIONAL TESTS:  < from: CT Angio Head w/ IV Cont (04.19.24 @ 21:02) >  ACC: [id number] EXAM:  CT ANGIO NECK (W)AW IC   ORDERED BY: BILLIE NOVOA   ACC: [id number] EXAM:  CT ANGIO BRAIN (W)AW IC   ORDERED BY: BILLIE NOVOA   ACC: [id number] EXAM:  CT BRAIN   ORDERED BY: BILLIE NOVOA   PROCEDURE DATE:  04/19/2024    INTERPRETATION:  CLINICAL INFORMATION: Pmh of HLD, ?AFib (no f/u by   cardiology), on aspirin 81mg, not on AC, presents to the ED for 2 weeks   of "coordination problem". Pt reports that 2 weeks ago, Pt had chest   tightness while Pt was cleaning up the yard, had nausea at that time,   then Pt drunk a lot water, resolved chest tightness a hour later. Since   then Pt has been having nausea and dizziness (not spinning) and has   difficulty in closing buttons, using a hair pin, and cooking. Denies HA,   visual changes, speech problem, weakness in the extremities, or   difficulty in speaking. Reports no problem for walking. Pt wants to be   checked for stroke.  TECHNIQUE:  1. Noncontrast head CT. Transaxial images were acquired from the skull   base to the vertex. Coronal and sagittal reformatted images.  2.  Contrast enhanced CT angiography of the neck and head was performed.    MIP reformats were generated.  Intravenous contrast: 70 cc of Omnipaque-350 mg/ml were administered; 30   ccwere discarded.  Complications: None reported at time of study completion.  COMPARISON STUDY: None.  FINDINGS:  NONCONTRAST CT HEAD:  INTRA-AXIAL: 4.6 x 3.5 x 4.0 cm (AP by transverse by craniocaudad) and an   area of abnormal density with central low attenuation and surrounding   vasogenic edema centered at the junction of the right frontoparietal   lobes. No acute hemorrhage or signs for acute territorial infarct.   Minimal periventricular white matter changes.  EXTRA-AXIAL: No mass or collection.  VENTRICLES: Mild mass effect on the body of the right lateral ventricle.   Otherwise, unremarkable in size and configuration for age. No significant   midline shift.  VISUALIZED PARANASAL SINUSES: Clear  MASTOID AIR CELLS: Clear  CALVARIUM: No acute calvarial findings. Defect in the right lamina   papyracea which could be from sequela of remote trauma or congenital.  SOFT TISSUES: Unremarkable.  ORBITS: Bilateral cataract surgery.  CT ANGIOGRAPHY NECK:  THORACIC AORTA AND BRANCH VESSELS: Three vessel arch.  No occlusion, flow   limiting stenosis or dissection.  RIGHT CAROTID SYSTEM: Patent. No significant calcific atherosclerotic   changes or stenosis. No evidence of dissection.  LEFT CAROTID SYSTEM: Patent. No significant atherosclerotic changes or   stenosis. No evidence of dissection.  VERTEBRAL ARTERIES: No occlusion, flow-limiting stenosis or dissection.   The left vertebral artery is dominant.  SOFT TISSUES OF THE NECK: Within normal limits.  VISUALIZED SPINE: Within normal limits.  VISUALIZED UPPER CHEST:  Within normal limits  CT ANGIOGRAPHY BRAIN:  INTERNAL CAROTID ARTERIES: Mild calcific atherosclerotic changes. No   flow-limiting stenosis, occlusion or aneurysm.  ANTERIOR CEREBRAL ARTERIES: No proximal stenosis, occlusion or aneurysm.  MIDDLE CEREBRAL ARTERIES: No proximal stenosis, occlusion or aneurysm.  ANTERIOR COMMUNICATING ARTERY: Patent without aneurysm.  POSTERIOR COMMUNICATING ARTERIES: Patent bilaterally without aneurysm.  POSTERIOR CEREBRAL ARTERIES: No proximal stenosis, occlusion or aneurysm.  VERTEBROBASILAR: No flow-limiting stenosis, occlusion or aneurysm.  The   distal vertebral arteries are codominant.  Posterior inferior cerebellar   arteries, anterior inferior cerebellar arteries and superior cerebellar   arteries are patent bilaterally.  VENOUS SINUSES: Superficial and deep venous systems are patent.  MISCELLANEOUS: Peripheral enhancement in area of vasogenic edema right   frontal parietal lobes.  IMPRESSION:  CT head: Right frontoparietal vasogenic edema secondary to underlying   enhancing cystic mass. Abscess is in the differential. This can be   further characterized with contrast-enhanced brain MRI.  CT ANGIOGRAPHY NECK: Patent cervical vasculature.  No hemodynamically   significant carotid stenosis or flow-limiting vertebral artery stenosis.    No evidence of dissection.  CT ANGIOGRAPHY BRAIN: No vessel occlusion, flow-limiting stenosis or   aneurysm.  --- End of Report --  VIRGIL JOINER MD; Attending Radiologist  This document has been electronically signed. Apr 19 2024  9:22PM  < end of copied text >    < from: MR Brain Stereotactic w/wo IV Cont (04.20.24 @ 03:10) >  ACC: [id number] EXAM:  MR BRAIN WAW IC FOR SRS   ORDERED BY: KELSEY PEDRO   PROCEDURE DATE:  04/20/2024    INTERPRETATION:  MR brain with and without gadolinium  CLINICAL INFORMATION:   RIGHT brain mass  TECHNIQUE:   Sagittal and axial T1-weighted images, axial FLAIR images,   axial T2-weighted images, axial gradient echo images and axial diffusion   weighted images of the brain were obtained. Following 6 cc of Gadavist   administration, 1.5 cc discarded, isotropic volumetric and fast spin echo   T1-weighted images were obtained; this data was reformatted using image   post processing software in multiple imaging planes.  FINDINGS:   CT head dated 04/19/2024 is available for review.  The brain demonstrates the presence of a 3.7 cm TRV by 4.2 cm AP by 4.2   cm cc necrotic enhancing heterogeneous mass in the posterior RIGHT   frontal lobe with moderate edema is suspicious for a high-grade glial   neoplasm. There is mild effacement of the RIGHT lateral ventricle with   4.6 mm subfalcine herniation to the LEFT. Following mild periventricular   white matter ischemia is noted. No acute cerebral cortical infarct is   found.   No intracranial hemorrhage is recognized.  The vertebral and internal carotid arteries demonstrate expected flow   voids indicating their patency.  The orbits are unremarkable.  The paranasal sinuses are significant for   minimal mucosal thickening in the BILATERAL ethmoid sinuses.  The nasal   cavity appears intact.  The nasopharynx is symmetric.  The central skull   base and petrous temporal bones are intact.  The calvarium appears   unremarkable.  IMPRESSION:  3.7 cm TRV by 4.2 cm AP by 4.2 cm cc necrotic enhancing   heterogeneous mass in the posterior RIGHT frontal lobe with moderate   edema is suspicious for a high-grade glial neoplasm. There is mild   effacement of the RIGHT lateral ventricle with 4.6 mm subfalcine   herniation to the LEFT.  --- End of Report ---  LAWRENCE AMBROSIO MD; Attending Radiologist  This document has been electronically signed. Apr 20 2024  8:55PM  < end of copied text >     HPI:  68 year-old female with PMHx Afib on ASA, HTN, HLD presents to [facility] for brain mass resection. CT Head at that time revealed a right frontoparietal mass for which neurosurgery was consulted.  No over night events. Pt sitting comfortably in bed. Denies headache, extremity tingling/numbness.    Vital Signs Last 24 Hrs  T(C): 36.6 (02 May 2024 07:46), Max: 36.9 (01 May 2024 15:53)  T(F): 97.9 (02 May 2024 07:46), Max: 98.5 (01 May 2024 15:53)  HR: 55 (02 May 2024 07:46) (55 - 82)  BP: 115/69 (02 May 2024 07:46) (115/69 - 142/57)  BP(mean): 90 (01 May 2024 13:30) (90 - 90)  RR: 18 (02 May 2024 07:46) (18 - 18)  SpO2: 98% (02 May 2024 07:46) (95% - 98%)    Parameters below as of 02 May 2024 07:46  Patient On (Oxygen Delivery Method): room air    PHYSICAL EXAM:  GENERAL: NAD, well-groomed  HEAD:  Atraumatic, normocephalic  CHRIS COMA SCORE: E4- V5- M6- =15  MENTAL STATUS: AAO x3; Awake; Opens eyes spontaneously, appropriately conversant without aphasia. following commands.  CRANIAL NERVES: PERRL. EOMI without nystagmus. Facial sensation intact V1-3 distribution b/l. Face symmetric w/ normal eye closure and smile, tongue midline. Speech clear.   REFLEXES: PERRL.   MOTOR: strength 5/5 b/l upper and lower extremities  SENSATION: grossly intact to light touch all extremities  COORDINATION: Gait not visualized    LABS:                        14.0   22.46 )-----------( 251      ( 02 May 2024 04:38 )             40.6     05-02    138  |  101  |  25.5<H>  ----------------------------<  157<H>  4.4   |  27.0  |  0.66    Ca    8.9      02 May 2024 04:38  Phos  3.3     05-02  Mg     2.3     05-02    TPro  6.2<L>  /  Alb  3.8  /  TBili  0.5  /  DBili  x   /  AST  13  /  ALT  31  /  AlkPhos  76  05-02  PT/INR - ( 02 May 2024 04:38 )   PT: 9.7 sec;   INR: 0.87 ratio    PTT - ( 02 May 2024 04:38 )  PTT:34.7 sec  Urinalysis Basic - ( 02 May 2024 04:38 )  Color: x / Appearance: x / SG: x / pH: x  Gluc: 157 mg/dL / Ketone: x  / Bili: x / Urobili: x   Blood: x / Protein: x / Nitrite: x   Leuk Esterase: x / RBC: x / WBC x   Sq Epi: x / Non Sq Epi: x / Bacteria: x    05-01 @ 07:01  -  05-02 @ 07:00  --------------------------------------------------------  IN: 400 mL / OUT: 0 mL / NET: 400 mL    RADIOLOGY & ADDITIONAL TESTS:  < from: CT Angio Head w/ IV Cont (04.19.24 @ 21:02) >  ACC: [id number] EXAM:  CT ANGIO NECK (W)AW IC   ORDERED BY: BILLIE NOVOA   ACC: [id number] EXAM:  CT ANGIO BRAIN (W)AW IC   ORDERED BY: BILLIE NOVOA   ACC: [id number] EXAM:  CT BRAIN   ORDERED BY: BILLIE NOVOA   PROCEDURE DATE:  04/19/2024    INTERPRETATION:  CLINICAL INFORMATION: Pmh of HLD, ?AFib (no f/u by   cardiology), on aspirin 81mg, not on AC, presents to the ED for 2 weeks   of "coordination problem". Pt reports that 2 weeks ago, Pt had chest   tightness while Pt was cleaning up the yard, had nausea at that time,   then Pt drunk a lot water, resolved chest tightness a hour later. Since   then Pt has been having nausea and dizziness (not spinning) and has   difficulty in closing buttons, using a hair pin, and cooking. Denies HA,   visual changes, speech problem, weakness in the extremities, or   difficulty in speaking. Reports no problem for walking. Pt wants to be   checked for stroke.  TECHNIQUE:  1. Noncontrast head CT. Transaxial images were acquired from the skull   base to the vertex. Coronal and sagittal reformatted images.  2.  Contrast enhanced CT angiography of the neck and head was performed.    MIP reformats were generated.  Intravenous contrast: 70 cc of Omnipaque-350 mg/ml were administered; 30   ccwere discarded.  Complications: None reported at time of study completion.  COMPARISON STUDY: None.  FINDINGS:  NONCONTRAST CT HEAD:  INTRA-AXIAL: 4.6 x 3.5 x 4.0 cm (AP by transverse by craniocaudad) and an   area of abnormal density with central low attenuation and surrounding   vasogenic edema centered at the junction of the right frontoparietal   lobes. No acute hemorrhage or signs for acute territorial infarct.   Minimal periventricular white matter changes.  EXTRA-AXIAL: No mass or collection.  VENTRICLES: Mild mass effect on the body of the right lateral ventricle.   Otherwise, unremarkable in size and configuration for age. No significant   midline shift.  VISUALIZED PARANASAL SINUSES: Clear  MASTOID AIR CELLS: Clear  CALVARIUM: No acute calvarial findings. Defect in the right lamina   papyracea which could be from sequela of remote trauma or congenital.  SOFT TISSUES: Unremarkable.  ORBITS: Bilateral cataract surgery.  CT ANGIOGRAPHY NECK:  THORACIC AORTA AND BRANCH VESSELS: Three vessel arch.  No occlusion, flow   limiting stenosis or dissection.  RIGHT CAROTID SYSTEM: Patent. No significant calcific atherosclerotic   changes or stenosis. No evidence of dissection.  LEFT CAROTID SYSTEM: Patent. No significant atherosclerotic changes or   stenosis. No evidence of dissection.  VERTEBRAL ARTERIES: No occlusion, flow-limiting stenosis or dissection.   The left vertebral artery is dominant.  SOFT TISSUES OF THE NECK: Within normal limits.  VISUALIZED SPINE: Within normal limits.  VISUALIZED UPPER CHEST:  Within normal limits  CT ANGIOGRAPHY BRAIN:  INTERNAL CAROTID ARTERIES: Mild calcific atherosclerotic changes. No   flow-limiting stenosis, occlusion or aneurysm.  ANTERIOR CEREBRAL ARTERIES: No proximal stenosis, occlusion or aneurysm.  MIDDLE CEREBRAL ARTERIES: No proximal stenosis, occlusion or aneurysm.  ANTERIOR COMMUNICATING ARTERY: Patent without aneurysm.  POSTERIOR COMMUNICATING ARTERIES: Patent bilaterally without aneurysm.  POSTERIOR CEREBRAL ARTERIES: No proximal stenosis, occlusion or aneurysm.  VERTEBROBASILAR: No flow-limiting stenosis, occlusion or aneurysm.  The   distal vertebral arteries are codominant.  Posterior inferior cerebellar   arteries, anterior inferior cerebellar arteries and superior cerebellar   arteries are patent bilaterally.  VENOUS SINUSES: Superficial and deep venous systems are patent.  MISCELLANEOUS: Peripheral enhancement in area of vasogenic edema right   frontal parietal lobes.  IMPRESSION:  CT head: Right frontoparietal vasogenic edema secondary to underlying   enhancing cystic mass. Abscess is in the differential. This can be   further characterized with contrast-enhanced brain MRI.  CT ANGIOGRAPHY NECK: Patent cervical vasculature.  No hemodynamically   significant carotid stenosis or flow-limiting vertebral artery stenosis.    No evidence of dissection.  CT ANGIOGRAPHY BRAIN: No vessel occlusion, flow-limiting stenosis or   aneurysm.  --- End of Report --  VIRGIL JOINER MD; Attending Radiologist  This document has been electronically signed. Apr 19 2024  9:22PM  < end of copied text >    < from: MR Brain Stereotactic w/wo IV Cont (04.20.24 @ 03:10) >  ACC: [id number] EXAM:  MR BRAIN WAW IC FOR SRS   ORDERED BY: KELSEY PEDRO   PROCEDURE DATE:  04/20/2024    INTERPRETATION:  MR brain with and without gadolinium  CLINICAL INFORMATION:   RIGHT brain mass  TECHNIQUE:   Sagittal and axial T1-weighted images, axial FLAIR images,   axial T2-weighted images, axial gradient echo images and axial diffusion   weighted images of the brain were obtained. Following 6 cc of Gadavist   administration, 1.5 cc discarded, isotropic volumetric and fast spin echo   T1-weighted images were obtained; this data was reformatted using image   post processing software in multiple imaging planes.  FINDINGS:   CT head dated 04/19/2024 is available for review.  The brain demonstrates the presence of a 3.7 cm TRV by 4.2 cm AP by 4.2   cm cc necrotic enhancing heterogeneous mass in the posterior RIGHT   frontal lobe with moderate edema is suspicious for a high-grade glial   neoplasm. There is mild effacement of the RIGHT lateral ventricle with   4.6 mm subfalcine herniation to the LEFT. Following mild periventricular   white matter ischemia is noted. No acute cerebral cortical infarct is   found.   No intracranial hemorrhage is recognized.  The vertebral and internal carotid arteries demonstrate expected flow   voids indicating their patency.  The orbits are unremarkable.  The paranasal sinuses are significant for   minimal mucosal thickening in the BILATERAL ethmoid sinuses.  The nasal   cavity appears intact.  The nasopharynx is symmetric.  The central skull   base and petrous temporal bones are intact.  The calvarium appears   unremarkable.  IMPRESSION:  3.7 cm TRV by 4.2 cm AP by 4.2 cm cc necrotic enhancing   heterogeneous mass in the posterior RIGHT frontal lobe with moderate   edema is suspicious for a high-grade glial neoplasm. There is mild   effacement of the RIGHT lateral ventricle with 4.6 mm subfalcine   herniation to the LEFT.  --- End of Report ---  LAWRENCE AMBROSIO MD; Attending Radiologist  This document has been electronically signed. Apr 20 2024  8:55PM  < end of copied text >

## 2024-05-02 NOTE — CONSULT NOTE ADULT - CONSULT REASON
medical clearance
Brain Mass pending resection
R frontal tumor s/p embo, planned resection
Cardiac Risk Stratification

## 2024-05-02 NOTE — PROGRESS NOTE ADULT - SUBJECTIVE AND OBJECTIVE BOX
Doing well, no complaints   Coordination improving   When tired, eyes get blurry   Strong, no focal deficit   Doing exercises in bed   VSS, Afebrile     FUNCTIONAL PROGRESS  4/30 PT     Bed Mobility: Sit to Supine:     · Level of [place]	independent    Bed Mobility: Supine to Sit:     · Level of [place]	independent    Transfer: Sit to Stand:     · Level of [place]	independent    Transfer: Stand to Sit:     · Level of [place]	independent    Gait Skills:     · Level of [place]	independent; mild lateral sway noted  · Gait Distance	100 feet    4/30 OT     Bathing Training:     · Level of [place]	independent    Upper Body Dressing Training:     · Level of [place]	independent    Lower Body Dressing Training:     · Level of [place]	independent    Toilet Hygiene Training:     · Level of [place]	independent    Grooming Training:     · Level of [place]	independent    Eating/Self-Feeding Training:     · Level of [place]	N/A        VITALS  T(C): 36.6 (05-02-24 @ 07:46), Max: 36.9 (05-01-24 @ 15:53)  HR: 45 (05-02-24 @ 14:29) (45 - 82)  BP: 120/78 (05-02-24 @ 14:29) (115/69 - 142/57)  RR: 18 (05-02-24 @ 12:21) (18 - 18)  SpO2: 98% (05-02-24 @ 12:21) (95% - 98%)  Wt(kg): --    MEDICATIONS   acetaminophen     Tablet .. 650 milliGRAM(s) every 6 hours PRN  atorvastatin 20 milliGRAM(s) at bedtime  ceFAZolin  Injectable. 2000 milliGRAM(s) once  chlorhexidine 4% Liquid 1 Application(s) once  dexAMETHasone     Tablet 4 milliGRAM(s) every 12 hours  dextrose 10% Bolus 125 milliLiter(s) once  dextrose 5%. 1000 milliLiter(s) <Continuous>  dextrose 5%. 1000 milliLiter(s) <Continuous>  dextrose 50% Injectable 25 Gram(s) once  dextrose 50% Injectable 12.5 Gram(s) once  dextrose Oral Gel 15 Gram(s) once PRN  diltiazem    milliGRAM(s) daily  fenofibrate Tablet 145 milliGRAM(s) at bedtime  glucagon  Injectable 1 milliGRAM(s) once  influenza  Vaccine (HIGH DOSE) 0.7 milliLiter(s) once  insulin lispro (ADMELOG) corrective regimen sliding scale   three times a day before meals  levETIRAcetam 500 milliGRAM(s) every 12 hours  pantoprazole    Tablet 40 milliGRAM(s) before breakfast  polyethylene glycol 3350 17 Gram(s) daily  senna 2 Tablet(s) at bedtime  sodium chloride 0.9%. 1000 milliLiter(s) <Continuous>      RECENT LABS/IMAGING  - Reviewed Today                        14.0   22.46 )-----------( 251      ( 02 May 2024 04:38 )             40.6     05-02    138  |  101  |  25.5<H>  ----------------------------<  157<H>  4.4   |  27.0  |  0.66    Ca    8.9      02 May 2024 04:38  Phos  3.3     05-02  Mg     2.3     05-02    TPro  6.2<L>  /  Alb  3.8  /  TBili  0.5  /  DBili  x   /  AST  13  /  ALT  31  /  AlkPhos  76  05-02    PT/INR - ( 02 May 2024 04:38 )   PT: 9.7 sec;   INR: 0.87 ratio         PTT - ( 02 May 2024 04:38 )  PTT:34.7 sec  Urinalysis Basic - ( 02 May 2024 04:38 )    Color: x / Appearance: x / SG: x / pH: x  Gluc: 157 mg/dL / Ketone: x  / Bili: x / Urobili: x   Blood: x / Protein: x / Nitrite: x   Leuk Esterase: x / RBC: x / WBC x   Sq Epi: x / Non Sq Epi: x / Bacteria: x    5/2 MRI Brain Stereotactic   IMPRESSION: There is a 3.9 x 3.9 x 3.3 cm heterogeneously enhancing,   centrally necrotic mass in the right parietal lobe suspicious for   malignancy. The lesion demonstrates foci of susceptibility artifact   suggesting hemorrhage. There is extensive surrounding T2/FLAIR   hyperintensity suggesting vasogenic edema and/or nonenhancing tumor.   Right-to-left midline shift measuring 3 mm.    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     ---------------------------------------------------------------------------------------  PHYSICAL EXAM  Constitutional - NAD, Comfortable  HEENT - Kerlex around head   Neck - Supple, No limited ROM  Chest - Breathing comfortably on RA   Cardiovascular - RRR   Abdomen - Soft   Extremities - No peripheral edema   Neurologic Exam -                    Cognitive - AAO to self, place, date, year, situation     Communication - Fluent, No dysarthria     Cranial Nerves - CN 2-12 intact     FUNCTIONAL MOTOR EXAM - No focal deficits - Has some sense of fine motor impairments when tested                    LEFT    UE - ShAB 5/5, EF 5/5, EE 5/5, WE 5/5,  5/5                    RIGHT UE - ShAB 5/5, EF 5/5, EE 5/5, WE 5/5,  5/5                    LEFT    LE - HF 5/5, KE 5/5, DF 5/5, PF 5/5                    RIGHT LE - HF 5/5, KE 5/5, DF 5/5, PF 5/5        Sensory - Intact to LT   Psychiatric - Mood stable, Affect WNL  ----------------------------------------------------------------------------------------  ASSESSMENT/PLAN  68yFemale with functional deficits related to right brain mass   Right Frontal Mass pending resection - Keppra, Decadron  CAD - Lipitor, Tricor  HTn - Cardizem  Pain - Tylenol  Diet - NPO   DVT PPX - SCDs, Lovenox  Rehab/Impaired mobility and function - Continuous hospitalization is crucial for managing the patient's acute medical issues (brain mass, coordination issues) which have significantly impacted their mobility, quality of life, and function. Rehabilitation recommendations will be based on the patient's functional progress and their ability to participate in and tolerate therapeutic interventions, which may change over time. Maintaining ongoing mobilization by the staff is imperative to prevent secondary medical complications and associated health issues related to debility.    PENDING:  OR for biopsy of brain mass 5/3     RECOMMENDATIONS:   -Bedside exercises     DISPOSITION:   To be determined, depend on medical stability and functional progress/deficits. PM&R will continue to follow.

## 2024-05-03 ENCOUNTER — APPOINTMENT (OUTPATIENT)
Dept: NEUROSURGERY | Facility: HOSPITAL | Age: 69
End: 2024-05-03

## 2024-05-03 ENCOUNTER — RESULT REVIEW (OUTPATIENT)
Age: 69
End: 2024-05-03

## 2024-05-03 LAB
ANION GAP SERPL CALC-SCNC: 11 MMOL/L — SIGNIFICANT CHANGE UP (ref 5–17)
BUN SERPL-MCNC: 22 MG/DL — HIGH (ref 8–20)
CALCIUM SERPL-MCNC: 8.6 MG/DL — SIGNIFICANT CHANGE UP (ref 8.4–10.5)
CHLORIDE SERPL-SCNC: 100 MMOL/L — SIGNIFICANT CHANGE UP (ref 96–108)
CO2 SERPL-SCNC: 28 MMOL/L — SIGNIFICANT CHANGE UP (ref 22–29)
CREAT SERPL-MCNC: 0.47 MG/DL — LOW (ref 0.5–1.3)
EGFR: 103 ML/MIN/1.73M2 — SIGNIFICANT CHANGE UP
GAS PNL BLDA: SIGNIFICANT CHANGE UP
GLUCOSE BLDC GLUCOMTR-MCNC: 127 MG/DL — HIGH (ref 70–99)
GLUCOSE BLDC GLUCOMTR-MCNC: 135 MG/DL — HIGH (ref 70–99)
GLUCOSE BLDC GLUCOMTR-MCNC: 143 MG/DL — HIGH (ref 70–99)
GLUCOSE BLDC GLUCOMTR-MCNC: 149 MG/DL — HIGH (ref 70–99)
GLUCOSE BLDC GLUCOMTR-MCNC: 153 MG/DL — HIGH (ref 70–99)
GLUCOSE SERPL-MCNC: 148 MG/DL — HIGH (ref 70–99)
HCT VFR BLD CALC: 38.5 % — SIGNIFICANT CHANGE UP (ref 34.5–45)
HGB BLD-MCNC: 13.3 G/DL — SIGNIFICANT CHANGE UP (ref 11.5–15.5)
MAGNESIUM SERPL-MCNC: 2.3 MG/DL — SIGNIFICANT CHANGE UP (ref 1.6–2.6)
MCHC RBC-ENTMCNC: 30.3 PG — SIGNIFICANT CHANGE UP (ref 27–34)
MCHC RBC-ENTMCNC: 34.5 GM/DL — SIGNIFICANT CHANGE UP (ref 32–36)
MCV RBC AUTO: 87.7 FL — SIGNIFICANT CHANGE UP (ref 80–100)
MRSA PCR RESULT.: SIGNIFICANT CHANGE UP
PHOSPHATE SERPL-MCNC: 4.4 MG/DL — SIGNIFICANT CHANGE UP (ref 2.4–4.7)
PLATELET # BLD AUTO: 214 K/UL — SIGNIFICANT CHANGE UP (ref 150–400)
POTASSIUM SERPL-MCNC: 4.5 MMOL/L — SIGNIFICANT CHANGE UP (ref 3.5–5.3)
POTASSIUM SERPL-SCNC: 4.5 MMOL/L — SIGNIFICANT CHANGE UP (ref 3.5–5.3)
RBC # BLD: 4.39 M/UL — SIGNIFICANT CHANGE UP (ref 3.8–5.2)
RBC # FLD: 12.7 % — SIGNIFICANT CHANGE UP (ref 10.3–14.5)
S AUREUS DNA NOSE QL NAA+PROBE: SIGNIFICANT CHANGE UP
SODIUM SERPL-SCNC: 138 MMOL/L — SIGNIFICANT CHANGE UP (ref 135–145)
WBC # BLD: 18.48 K/UL — HIGH (ref 3.8–10.5)
WBC # FLD AUTO: 18.48 K/UL — HIGH (ref 3.8–10.5)

## 2024-05-03 PROCEDURE — 69990 MICROSURGERY ADD-ON: CPT | Mod: AS,59

## 2024-05-03 PROCEDURE — 70450 CT HEAD/BRAIN W/O DYE: CPT | Mod: 26

## 2024-05-03 PROCEDURE — 61781 SCAN PROC CRANIAL INTRA: CPT

## 2024-05-03 PROCEDURE — 99232 SBSQ HOSP IP/OBS MODERATE 35: CPT

## 2024-05-03 PROCEDURE — 61781 SCAN PROC CRANIAL INTRA: CPT | Mod: AS

## 2024-05-03 PROCEDURE — 88331 PATH CONSLTJ SURG 1 BLK 1SPC: CPT | Mod: 26

## 2024-05-03 PROCEDURE — 61510 CRNEC TREPH EXC BRN TUM STTL: CPT | Mod: AS

## 2024-05-03 PROCEDURE — 88341 IMHCHEM/IMCYTCHM EA ADD ANTB: CPT | Mod: 26

## 2024-05-03 PROCEDURE — 88307 TISSUE EXAM BY PATHOLOGIST: CPT | Mod: 26

## 2024-05-03 PROCEDURE — 61510 CRNEC TREPH EXC BRN TUM STTL: CPT

## 2024-05-03 PROCEDURE — 69990 MICROSURGERY ADD-ON: CPT | Mod: 59

## 2024-05-03 PROCEDURE — 88342 IMHCHEM/IMCYTCHM 1ST ANTB: CPT | Mod: 26

## 2024-05-03 DEVICE — PLATE COVER BURRHOLE UN3 W/TAB 14MM: Type: IMPLANTABLE DEVICE | Status: FUNCTIONAL

## 2024-05-03 DEVICE — SURGICEL 2 X 14": Type: IMPLANTABLE DEVICE | Status: FUNCTIONAL

## 2024-05-03 DEVICE — SURGIFOAM PAD 8CM X 12.5CM X 10MM (100): Type: IMPLANTABLE DEVICE | Status: FUNCTIONAL

## 2024-05-03 DEVICE — GRAFT DURAGEN PLUS 3X3IN: Type: IMPLANTABLE DEVICE | Status: FUNCTIONAL

## 2024-05-03 DEVICE — FLOSEAL WITH RECOTHROM THROMBIN 10ML: Type: IMPLANTABLE DEVICE | Status: FUNCTIONAL

## 2024-05-03 DEVICE — SURGICEL FIBRILLAR 4 X 4": Type: IMPLANTABLE DEVICE | Status: FUNCTIONAL

## 2024-05-03 DEVICE — SCREW UN3 AXS SELF DRILL 1.5X4MM: Type: IMPLANTABLE DEVICE | Status: FUNCTIONAL

## 2024-05-03 DEVICE — MAYFIELD SKULL PIN ADULT STEEL: Type: IMPLANTABLE DEVICE | Status: FUNCTIONAL

## 2024-05-03 DEVICE — KIT A-LINE 1LUM 20G X 12CM SAFE KIT: Type: IMPLANTABLE DEVICE | Status: FUNCTIONAL

## 2024-05-03 RX ORDER — ACETAMINOPHEN 500 MG
1000 TABLET ORAL EVERY 6 HOURS
Refills: 0 | Status: DISCONTINUED | OUTPATIENT
Start: 2024-05-03 | End: 2024-05-07

## 2024-05-03 RX ORDER — CHLORHEXIDINE GLUCONATE 213 G/1000ML
1 SOLUTION TOPICAL DAILY
Refills: 0 | Status: DISCONTINUED | OUTPATIENT
Start: 2024-05-03 | End: 2024-05-07

## 2024-05-03 RX ORDER — CEFAZOLIN SODIUM 1 G
2000 VIAL (EA) INJECTION EVERY 8 HOURS
Refills: 0 | Status: DISCONTINUED | OUTPATIENT
Start: 2024-05-03 | End: 2024-05-03

## 2024-05-03 RX ORDER — HYDRALAZINE HCL 50 MG
10 TABLET ORAL
Refills: 0 | Status: DISCONTINUED | OUTPATIENT
Start: 2024-05-03 | End: 2024-05-08

## 2024-05-03 RX ORDER — HYDROMORPHONE HYDROCHLORIDE 2 MG/ML
0.5 INJECTION INTRAMUSCULAR; INTRAVENOUS; SUBCUTANEOUS EVERY 6 HOURS
Refills: 0 | Status: DISCONTINUED | OUTPATIENT
Start: 2024-05-03 | End: 2024-05-08

## 2024-05-03 RX ORDER — CEFAZOLIN SODIUM 1 G
2000 VIAL (EA) INJECTION EVERY 8 HOURS
Refills: 0 | Status: DISCONTINUED | OUTPATIENT
Start: 2024-05-03 | End: 2024-05-07

## 2024-05-03 RX ORDER — DEXAMETHASONE 0.5 MG/5ML
4 ELIXIR ORAL EVERY 12 HOURS
Refills: 0 | Status: DISCONTINUED | OUTPATIENT
Start: 2024-05-03 | End: 2024-05-03

## 2024-05-03 RX ORDER — FENOFIBRATE,MICRONIZED 130 MG
1 CAPSULE ORAL
Refills: 0 | DISCHARGE

## 2024-05-03 RX ORDER — TRAMADOL HYDROCHLORIDE 50 MG/1
50 TABLET ORAL EVERY 4 HOURS
Refills: 0 | Status: DISCONTINUED | OUTPATIENT
Start: 2024-05-03 | End: 2024-05-08

## 2024-05-03 RX ORDER — DEXAMETHASONE 0.5 MG/5ML
4 ELIXIR ORAL EVERY 6 HOURS
Refills: 0 | Status: DISCONTINUED | OUTPATIENT
Start: 2024-05-03 | End: 2024-05-03

## 2024-05-03 RX ORDER — LEVETIRACETAM 250 MG/1
500 TABLET, FILM COATED ORAL EVERY 12 HOURS
Refills: 0 | Status: DISCONTINUED | OUTPATIENT
Start: 2024-05-03 | End: 2024-05-04

## 2024-05-03 RX ORDER — ATORVASTATIN CALCIUM 80 MG/1
1 TABLET, FILM COATED ORAL
Refills: 0 | DISCHARGE

## 2024-05-03 RX ORDER — TRAMADOL HYDROCHLORIDE 50 MG/1
25 TABLET ORAL EVERY 4 HOURS
Refills: 0 | Status: DISCONTINUED | OUTPATIENT
Start: 2024-05-03 | End: 2024-05-08

## 2024-05-03 RX ORDER — DILTIAZEM HCL 120 MG
1 CAPSULE, EXT RELEASE 24 HR ORAL
Refills: 0 | DISCHARGE

## 2024-05-03 RX ORDER — ONDANSETRON 8 MG/1
4 TABLET, FILM COATED ORAL EVERY 6 HOURS
Refills: 0 | Status: DISCONTINUED | OUTPATIENT
Start: 2024-05-03 | End: 2024-05-08

## 2024-05-03 RX ORDER — DEXAMETHASONE 0.5 MG/5ML
4 ELIXIR ORAL EVERY 6 HOURS
Refills: 0 | Status: DISCONTINUED | OUTPATIENT
Start: 2024-05-03 | End: 2024-05-07

## 2024-05-03 RX ORDER — LABETALOL HCL 100 MG
10 TABLET ORAL
Refills: 0 | Status: DISCONTINUED | OUTPATIENT
Start: 2024-05-03 | End: 2024-05-08

## 2024-05-03 RX ADMIN — LEVETIRACETAM 500 MILLIGRAM(S): 250 TABLET, FILM COATED ORAL at 17:34

## 2024-05-03 RX ADMIN — SODIUM CHLORIDE 70 MILLILITER(S): 9 INJECTION INTRAMUSCULAR; INTRAVENOUS; SUBCUTANEOUS at 00:18

## 2024-05-03 RX ADMIN — Medication 2000 MILLIGRAM(S): at 22:30

## 2024-05-03 RX ADMIN — Medication 4 MILLIGRAM(S): at 06:05

## 2024-05-03 RX ADMIN — LEVETIRACETAM 500 MILLIGRAM(S): 250 TABLET, FILM COATED ORAL at 06:05

## 2024-05-03 RX ADMIN — ATORVASTATIN CALCIUM 20 MILLIGRAM(S): 80 TABLET, FILM COATED ORAL at 22:31

## 2024-05-03 RX ADMIN — AMINOLEVULINIC ACID HYDROCHLORIDE 1490 MILLIGRAM(S): 1500 POWDER, FOR SOLUTION ORAL at 07:00

## 2024-05-03 RX ADMIN — SENNA PLUS 2 TABLET(S): 8.6 TABLET ORAL at 22:30

## 2024-05-03 RX ADMIN — PANTOPRAZOLE SODIUM 40 MILLIGRAM(S): 20 TABLET, DELAYED RELEASE ORAL at 06:05

## 2024-05-03 RX ADMIN — CHLORHEXIDINE GLUCONATE 1 APPLICATION(S): 213 SOLUTION TOPICAL at 17:35

## 2024-05-03 RX ADMIN — Medication 102 MILLIGRAM(S): at 17:34

## 2024-05-03 NOTE — PROGRESS NOTE ADULT - SUBJECTIVE AND OBJECTIVE BOX
POST-OPERATIVE NOTE    Procedure: 5/3/24 Right craniotomy for right parietal tumor resection w/ 5ALA. Some fluorescence with 5-ALA under microscope.  Frozen and permanent specimen sent-R brain tumor  1 subgaleal drain to full suction   milliLiter(s)  3000cc NS  1000cc LR  75g mannitol  urine output 1050 mL    Neuromonitoring conducted throughout case, motors at baseline at end of case. Did have a decrease in SSPs on the left toward end of case but motors initially improved and then returned to baseline    Diagnosis/Indication: right frontoparietal brain mass    Surgeon: Dr. Guaman    INTERVAL HPI/ACUTE EVENTS:  68y Female PMH admitted with now s/p Right craniotomy for right parietal tumor resection w/ 5ALA. Some fluorescence with 5-ALA under microscope.   POD#0. Patient seen lying comfortably in bed. Lethargic but EO to voice. States she has a 5/10 headache. Patient remains in darkened room post 5-ALA.     VITALS:  T(C): 36.9 (05-03-24 @ 16:30), Max: 37.1 (05-03-24 @ 15:30)  HR: 68 (05-03-24 @ 18:00) (51 - 91)  BP: 106/56 (05-03-24 @ 18:00) (90/64 - 148/106)  RR: 15 (05-03-24 @ 18:00) (6 - 22)  SpO2: 99% (05-03-24 @ 18:00) (96% - 100%)  Wt(kg): --    PHYSICAL EXAM:  GENERAL: NAD  HEAD:  S/p R crani. Dressing clean, dry, intact. Dried blood noted, not fully saturated. + staples.  DRAINS: Subgaleal drain to full suction, no drainage noted.  CHRIS COMA SCORE: E3- V5- M6- = 14  MENTAL STATUS: AAO x3; lethargic but EO to voice, hypophonic, appropriately conversant without aphasia; following commands, b/l pupil 3mm react, EOMI without nystagmus. Facial sensation intact V1-3 distribution b/l. Face symmetric w/ normal eye closure and smile, tongue midline. Hearing grossly intact. Speech clear. Head turning and shoulder shrug intact.   MOTOR/SENSATION: Right side 5/5 strength on upper and lower extremity, LUE bicep/ tricep/ delt 4+/5 HG 2/5, LLE HF 4/5 DF/PF 5/5, decreased sensation on left upper and lower extremity  CHEST/LUNG: unlabored breathing  ABDOMEN: Soft, nontender, nondistended  EXTREMITIES:  2+ peripheral pulses, no clubbing, cyanosis, or edema  SKIN: Warm, dry; no rashes or lesions    LABS:                   13.3   18.48 )-----------( 214      ( 03 May 2024 03:43 )             38.5     05-03    138  |  100  |  22.0<H>  ----------------------------<  148<H>  4.5   |  28.0  |  0.47<L>    Ca    8.6      03 May 2024 03:43  Phos  4.4     05-03  Mg     2.3     05-03    TPro  6.2<L>  /  Alb  3.8  /  TBili  0.5  /  DBili  x   /  AST  13  /  ALT  31  /  AlkPhos  76  05-02

## 2024-05-03 NOTE — ASU PREOP CHECKLIST - SKIN PREP
HPI   The patient is a 23-year-old male presenting with injury to his right eye.  This occurred while at work, just prior to arrival.  He was using a weed whip when a rock flew up into the right eye.  He had just pushed up his glasses to wipe sweat and he restarted work without putting them down.  He believes he closed his lid before the object hit his right eye.  He has had tearing since the injury.  He denies having a foreign body sensation.  He has blurred vision when looking out of just the right eye.        Allergies:  No Known Allergies  Problem List:    Patient Active Problem List    Diagnosis Date Noted     Syncope, unspecified syncope type 2017     Priority: Medium     Sinus bradycardia 2017     Priority: Medium      Past Medical History:    History reviewed. No pertinent past medical history.  Past Surgical History:    History reviewed. No pertinent surgical history.  Family History:    Family History   Problem Relation Age of Onset     Diabetes Mother      Diabetes Father      Coronary Artery Disease Maternal Grandfather         Multiple stents.     Brain Tumor Maternal Grandfather      Social History:  Marital Status:  Single [1]  Social History     Tobacco Use     Smoking status: Former Smoker     Packs/day: 0.50     Years: 8.00     Pack years: 4.00     Types: Cigarettes     Last attempt to quit: 3/2/2020     Years since quittin.2     Smokeless tobacco: Never Used   Substance Use Topics     Alcohol use: No     Comment: None as of lately.  Previous use.  17 visit.     Drug use: None      Medications:    cephALEXin (KEFLEX) 500 MG capsule  cyclobenzaprine (FLEXERIL) 10 MG tablet  ketorolac (TORADOL) 10 MG tablet  ondansetron (ZOFRAN ODT) 4 MG ODT tab  valACYclovir (VALTREX) 1000 mg tablet      Review of Systems   All other systems reviewed and are negative.      PE   BP: 130/70  Heart Rate: 79  Temp: 99.3  F (37.4  C)  Resp: 18  Weight: 74.8 kg (165 lb)  SpO2: 98 %  Physical  Exam  Vitals signs and nursing note reviewed.   Constitutional:       General: He is in acute distress.   HENT:      Head: Atraumatic.      Nose: Nose normal.      Mouth/Throat:      Mouth: Mucous membranes are moist.   Eyes:      Comments: The patient has tearing of the right eye.  With the ophthalmoscope I am not able to visualize a foreign body.  The upper lid was flipped and the underside was wiped with a moistened swab.  No foreign body identified.  A slit lamp was then used to visualize the eye.  No foreign body identified.  Anterior chamber unremarkable.  I was able to see a corneal abrasion overlying the pupil.  No laceration or flap wound.  No foreign body appreciated.  No contamination.  Pupil was equal, round, and reactive when compared to the left.  Extraocular movements are intact.  The exam was performed after topical anesthetic instilled.   Neck:      Musculoskeletal: Normal range of motion.   Cardiovascular:      Heart sounds: Normal heart sounds.   Pulmonary:      Effort: No respiratory distress.      Breath sounds: Normal breath sounds.   Abdominal:      General: Bowel sounds are normal.      Palpations: Abdomen is soft.      Tenderness: There is no abdominal tenderness.   Musculoskeletal: Normal range of motion.         General: No tenderness.   Skin:     General: Skin is warm.      Findings: No rash.   Neurological:      Mental Status: He is alert and oriented to person, place, and time.   Psychiatric:         Behavior: Behavior normal.         ED COURSE and MDM   1147.  Patient has a corneal abrasion overlying the right pupil.  Topical antibiotics provided.  Anesthetic provided with restrictions reviewed.  Follow-up with the eye clinic as needed.  Return for worsening as discussed.    LABS  Labs Ordered and Resulted from Time of ED Arrival Up to the Time of Departure from the ED - No data to display    IMAGING  Images reviewed by me.  Radiology report also reviewed.  No orders to display        Procedures    Medications   trimethoprim-polymyxin b (POLYTRIM) ophthalmic solution 2 drop (has no administration in time range)   tetracaine (PONTOCAINE) 0.5 % ophthalmic solution 1-2 drop (2 drops Right Eye Given 6/9/20 1118)         IMPRESSION       ICD-10-CM    1. Abrasion of right cornea, initial encounter  S05.01XA             Medication List      ASK your doctor about these medications    cephALEXin 500 MG capsule  Commonly known as:  KEFLEX  500 mg, Oral, 4 TIMES DAILY  Ask about: Should I take this medication?     HYDROcodone-acetaminophen 5-325 MG tablet  Commonly known as:  NORCO  1 tablet, Oral, EVERY 6 HOURS PRN  Ask about: Should I take this medication?                          Ricky Madrigal MD  06/09/20 1146     n/a

## 2024-05-03 NOTE — PROGRESS NOTE ADULT - SUBJECTIVE AND OBJECTIVE BOX
Patient is a 68y old  Female who presents with a chief complaint of R frontoparietal brain mass (02 May 2024 12:27)    HPI:  68 year-old female with PMHx Afib on ASA, HTN, HLD presents to [facility] for brain mass resection. Patient initially presented on 4/19/24 for 2 weeks of difficulty with fine motor skills (buttoning shirt, combing hair, holding utensils). CT Head at that time revealed a right frontoparietal mass for which neurosurgery was consulted. Pt was admitted to medicine for mets workup, CT CAP was negative. 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 Has been taking Keppra 500 BID for seizure ppx. Last dose of ASA was 4/28. Reports fine motor skills have been improving and otherwise feels well. Patient scheduled for R brain mass resection on 5/3/24 with Dr. Guaman.  (30 Apr 2024 12:29)    Today, she underwent cerebral angiogram which showed R posterior frontal tumor with filling from RMCA, no embolization was performed. Admitted to NSICU for post op cares with planned tumor resection tomorrow 5/3.       PAST MEDICAL & SURGICAL HISTORY:  HLD (hyperlipidemia)  Paroxysmal atrial fibrillation  No significant past surgical history      FAMILY HISTORY:  Family history of breast cancer in mother (Mother)      Allergies  oxycodone (Unknown)      REVIEW OF SYSTEMS  Negative except as noted in HPI  CONSTITUTIONAL: No fever, weight loss, or fatigue  EYES: No eye pain, visual disturbances, or discharge  ENMT:  No difficulty hearing, tinnitus, vertigo; No sinus or throat pain  NECK: No pain or stiffness  RESPIRATORY: No cough, wheezing, chills or hemoptysis; No shortness of breath  CARDIOVASCULAR: No chest pain, palpitations, dizziness, or leg swelling  GASTROINTESTINAL: No abdominal or epigastric pain. No nausea, vomiting  GENITOURINARY: No dysuria, frequency, hematuria, or incontinence  NEUROLOGICAL: No headaches, memory loss, loss of strength, numbness, or tremors  MUSCULOSKELETAL: No joint pain or swelling; No muscle, back, or extremity pain      HOME MEDICATIONS:  Home Medications:  atorvastatin 20 mg oral tablet: 1 tab(s) orally once a day (20 Apr 2024 04:06)  Diltia  mg/24 hours oral capsule, extended release: 1 cap(s) orally once a day (20 Apr 2024 04:06)  fenofibrate 120 mg oral tablet: 1 tab(s) orally once a day (20 Apr 2024 04:06)      MEDICATIONS:  Antibiotics:    Neuro:  acetaminophen     Tablet .. 650 milliGRAM(s) Oral every 6 hours PRN  levETIRAcetam 500 milliGRAM(s) Oral every 12 hours    Anticoagulation:    OTHER:  atorvastatin 20 milliGRAM(s) Oral at bedtime  dexAMETHasone     Tablet 4 milliGRAM(s) Oral every 12 hours  dextrose 50% Injectable 25 Gram(s) IV Push once  dextrose 50% Injectable 12.5 Gram(s) IV Push once  dextrose Oral Gel 15 Gram(s) Oral once PRN  diltiazem    milliGRAM(s) Oral daily  fenofibrate Tablet 145 milliGRAM(s) Oral at bedtime  glucagon  Injectable 1 milliGRAM(s) IntraMuscular once  influenza  Vaccine (HIGH DOSE) 0.7 milliLiter(s) IntraMuscular once  insulin lispro (ADMELOG) corrective regimen sliding scale   SubCutaneous three times a day before meals  pantoprazole    Tablet 40 milliGRAM(s) Oral before breakfast  polyethylene glycol 3350 17 Gram(s) Oral daily  senna 2 Tablet(s) Oral at bedtime    IVF:  dextrose 10% Bolus 125 milliLiter(s) IV Bolus once  dextrose 5%. 1000 milliLiter(s) IV Continuous <Continuous>  dextrose 5%. 1000 milliLiter(s) IV Continuous <Continuous>  sodium chloride 0.9%. 1000 milliLiter(s) IV Continuous <Continuous>      Vital Signs Last 24 Hrs  T(C): 36.5 (02 May 2024 17:06), Max: 36.9 (02 May 2024 00:00)  T(F): 97.7 (02 May 2024 17:06), Max: 98.5 (02 May 2024 00:00)  HR: 56 (02 May 2024 17:06) (45 - 82)  BP: 120/78 (02 May 2024 14:29) (115/69 - 123/66)  RR: 16 (02 May 2024 17:06) (16 - 18)  SpO2: 98% (02 May 2024 17:06) (95% - 98%)    Parameters below as of 02 May 2024 17:06  Patient On (Oxygen Delivery Method): room air      Physical Exam:  Constitutional: NAD, lying in bed  Neuro  * Mental Status:  GCS 15: Awake, alert, oriented to conversation. No aphasia or difficulty speaking. No dysarthria.  * Cranial Nerves: Cnii-Cnxii grossly intact. PERRL, EOMI, tongue midline, no gaze deviation  * Motor: RUE 5/5, LUE 5/5, RLE 5/5, LLE 5/5, no drift or dysmetria  * Sensory: Sensation intact to light touch  * Reflexes: not assessed   Cardiovascular: Regular rate and rhythm.  Eyes: See neurologic examination with detailed examination of eyes.  ENT: No JVD, Trachea Midline  Respiratory: non labored breathing   Gastrointestinal: Soft, nontender, nondistended.  Genitourinary: [ x ] Robb, [ ] No Robb.   Musculoskeletal: No muscle wasting noted, (See neurologic assessment for full muscle strength assessment)   Skin:  no wounds, no redness, no abrasions noted  Hematologic / Lymph / Immunologic: No bleeding from IV sites or wounds, No lymphadenopathy, No Hives or allergic type skin lesions      LABS:                        14.0   22.46 )-----------( 251      ( 02 May 2024 04:38 )             40.6     05-02    138  |  101  |  25.5<H>  ----------------------------<  157<H>  4.4   |  27.0  |  0.66    Ca    8.9      02 May 2024 04:38  Phos  3.3     05-02  Mg     2.3     05-02    TPro  6.2<L>  /  Alb  3.8  /  TBili  0.5  /  DBili  x   /  AST  13  /  ALT  31  /  AlkPhos  76  05-02    PT/INR - ( 02 May 2024 04:38 )   PT: 9.7 sec;   INR: 0.87 ratio    PTT - ( 02 May 2024 04:38 )  PTT:34.7 sec      RADIOLOGY & ADDITIONAL STUDIES:  < from: MR Brain Stereotactic w/wo IV Cont (05.02.24 @ 11:37) >  IMPRESSION: Thereis a 3.9 x 3.9 x 3.3 cm heterogeneously enhancing,   centrally necrotic mass in the right parietal lobe suspicious for   malignancy. The lesion demonstrates foci of susceptibility artifact   suggesting hemorrhage. There is extensive surrounding T2/FLAIR   hyperintensity suggesting vasogenic edema and/or nonenhancing tumor.   Right-to-left midline shift measuring 3 mm.    --- End of Report ---  DAY SALAZAR MD; Attending Radiologist  This document has been electronically signed. May  2 [id number]    < end of copied text >    < from: US Duplex Venous Lower Ext Complete, Bilateral (05.02.24 @ 15:56) >  IMPRESSION:  No evidence of deep venous thrombosis in either lower extremity.    --- End of Report ---  NIELS ASIF MD; Attending Radiologist  This document has been electronically signed. May  2 82166:26PM    < end of copied text >   68 year-old female with PMHx Afib on ASA, HTN, HLD presents to [facility] for brain mass resection. Patient initially presented on 4/19/24 for 2 weeks of difficulty with fine motor skills (buttoning shirt, combing hair, holding utensils). CT Head at that time revealed a right frontoparietal mass for which neurosurgery was consulted. Pt was admitted to medicine for mets workup, CT CAP was negative. 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 Has been taking Keppra 500 BID for seizure ppx. Last dose of ASA was 4/28. Reports fine motor skills have been improving and otherwise feels well. Patient scheduled for R brain mass resection on 5/3/24 with Dr. Guaman.  (30 Apr 2024 12:29)  Today, she underwent cerebral angiogram which showed R posterior frontal tumor with filling from RMCA, no embolization was performed. Admitted to NSICU for post op cares with planned tumor resection tomorrow 5/3. (05/03/2024)    O/n events: none reported.     ICU Vital Signs Last 24 Hrs  T(C): 36.9 (03 May 2024 21:22), Max: 37.1 (03 May 2024 15:30)  T(F): 98.5 (03 May 2024 21:22), Max: 98.8 (03 May 2024 15:30)  HR: 66 (04 May 2024 00:00) (51 - 91)  BP: 97/68 (04 May 2024 00:00) (90/64 - 123/65)  BP(mean): 77 (04 May 2024 00:00) (65 - 86)  ABP: 104/43 (04 May 2024 00:00) (103/52 - 130/63)  ABP(mean): 66 (04 May 2024 00:00) (66 - 84)  RR: 15 (04 May 2024 00:00) (6 - 18)  SpO2: 100% (04 May 2024 00:00) (96% - 100%)    O2 Parameters below as of 04 May 2024 00:00  Patient On (Oxygen Delivery Method): room air      Physical Exam:  Constitutional: NAD, lying in bed  Neuro  * Mental Status:  GCS 15: Awake, alert, oriented to conversation. No aphasia or difficulty speaking. No dysarthria.  * Cranial Nerves: PERRL, EOMI, tongue midline, no gaze deviation  * Motor: RUE 5/5, LUE 5/5, RLE 5/5, LLE 5/5, no drift or dysmetria  * Sensory: Sensation intact to light touch     Cardiovascular: S1S2 present  Respiratory: non labored breathing, CTAB  Gastrointestinal: Soft, nontender, nondistended.  No peripheral edema.

## 2024-05-03 NOTE — BRIEF OPERATIVE NOTE - COMMENTS
Neuromonitoring conducted throughout case, motors at baseline at end of case. Did have a decrease in SSPs on the left toward end of case but motors initially improved and then returned to baseline

## 2024-05-03 NOTE — BRIEF OPERATIVE NOTE - NSICDXBRIEFPROCEDURE_GEN_ALL_CORE_FT
PROCEDURES:  Right craniotomy for tumor 03-May-2024 15:55:16 R craniotomy for R parietal tumor resection Paz Graff

## 2024-05-03 NOTE — PROGRESS NOTE ADULT - ASSESSMENT
ASSESSMENT: 68 year-old female with PMHx Afib on ASA, HTN, HLD presents to [facility] for brain mass resection. Known to NSGY service after initially presenting on 4/19/24 for 2 weeks of difficulty with fine motor skills. 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 S/p diagnostic angio on 5/2. Now POD#0 s/p Right craniotomy for right parietal tumor resection w/ 5ALA. Some fluorescence with 5-ALA under microscope.    Neuromonitoring conducted throughout case, motors at baseline at end of case. Did have a decrease in SSPs on the left toward end of case but motors initially improved and then returned to baseline    PLAN:    NEURO:    - q1h neuro checks  - CTH 6hrs post op (8PM)  - keppra 500 BID for seizure ppx  - dex 4 Q6  - Pain control prn  - SG GLENN to full suction, record and monitor outputs Q12hrs. Continue Ancef while drain is in place.  - keep in completely darkened room x 48hrs  - SBP <140  - encourage incentive spirometry   - keep NPO until post op CTH, will need bedside dysphagia  - bowel regimen  -  DVT prophylaxis: SCDs, hold chemoppx due to fresh post op  - PT/OT/OOB    Disposition:  ICU

## 2024-05-03 NOTE — BRIEF OPERATIVE NOTE - OPERATION/FINDINGS
Right craniotomy for right parietal tumor resection w/ 5ALA. Some fluorescence with 5-ALA under microscope.

## 2024-05-03 NOTE — PROGRESS NOTE ADULT - ASSESSMENT
68 year-old female with PMHx Afib on ASA, HTN, HLD presents to [facility] for brain mass resection. Known to NSGY service after initially presenting on 4/19/24 for 2 weeks of difficulty with fine motor skills. 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 Patient scheduled for R brain mass resection on 5/3/24 with Dr. Guaman.    Plan:  - Q4 neuro checks  - 5/3 AM   - MRI Brain stereotactic w/wo completed for OR tomorrow 5/3; fiducials placed today  - Pt s/p diagnsotic cerebral angiography w/o tumor embolization today with neuro IR (Dr. Mansfield)  - Pre-oped for today (5/3) for tumor resection with Dr. Guaman  - Medicine and cardiac clearances appreciated  - Pending baseline LE dopplers and mrsa/mssa swabs negative  - NPO  - Hold lovenox till cleared by neurosurgery  - Pt surgically stable at this time  - Case and plan discussed with Dr. Guaman

## 2024-05-03 NOTE — PROGRESS NOTE ADULT - SUBJECTIVE AND OBJECTIVE BOX
HPI:  HPI:  68 year-old female with PMHx Afib on ASA, HTN, HLD presents to [facility] for brain mass resection. Patient initially presented on 4/19/24 for 2 weeks of difficulty with fine motor skills (buttoning shirt, combing hair, holding utensils). CT Head at that time revealed a right frontoparietal mass for which neurosurgery was consulted. Pt was admitted to medicine for mets workup, CT CAP was negative. 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 Has been taking Keppra 500 BID for seizure ppx. Last dose of ASA was 4/28. Reports fine motor skills have been improving and otherwise feels well. Patient scheduled for R brain mass resection on 5/3/24 with Dr. Guaman.  (30 Apr 2024 12:29)    INTERVAL HPI/OVERNIGHT EVENTS:  68y Female s/p diagnostic angio on 5/2 seen lying comfortably in bed. Pt is going to OR today for tumor resection. Denies headache, weakness, numbness, n/v/d, fevers, chills, chest pain, SOB.     Vital Signs Last 24 Hrs  T(C): 36.9 (02 May 2024 23:29), Max: 36.9 (02 May 2024 23:29)  T(F): 98.4 (02 May 2024 23:29), Max: 98.4 (02 May 2024 23:29)  HR: 51 (03 May 2024 01:00) (45 - 82)  BP: 109/57 (03 May 2024 01:00) (107/56 - 148/106)  BP(mean): 72 (03 May 2024 01:00) (71 - 119)  RR: 14 (03 May 2024 01:00) (11 - 22)  SpO2: 97% (03 May 2024 01:00) (97% - 100%)    Parameters below as of 02 May 2024 20:00  Patient On (Oxygen Delivery Method): room air        PHYSICAL EXAM:  GENERAL: NAD, well-groomed  HEAD:  Atraumatic, normocephalic  CHRIS COMA SCORE: E4- V5- M6- =15  MENTAL STATUS: AAO x3; Awake; Opens eyes spontaneously, appropriately conversant without aphasia. following commands.  CRANIAL NERVES: PERRL. EOMI without nystagmus. Facial sensation intact V1-3 distribution b/l. Face symmetric w/ normal eye closure and smile, tongue midline. Speech clear.   REFLEXES: PERRL.   MOTOR: strength 5/5 b/l upper and lower extremities  SENSATION: grossly intact to light touch all extremities  COORDINATION: Gait not visualized  SKIN: Warm, dry    LABS:                        14.0   22.46 )-----------( 251      ( 02 May 2024 04:38 )             40.6     05-02    138  |  101  |  25.5<H>  ----------------------------<  157<H>  4.4   |  27.0  |  0.66    Ca    8.9      02 May 2024 04:38  Phos  3.3     05-02  Mg     2.3     05-02    TPro  6.2<L>  /  Alb  3.8  /  TBili  0.5  /  DBili  x   /  AST  13  /  ALT  31  /  AlkPhos  76  05-02    PT/INR - ( 02 May 2024 04:38 )   PT: 9.7 sec;   INR: 0.87 ratio         PTT - ( 02 May 2024 04:38 )  PTT:34.7 sec  Urinalysis Basic - ( 02 May 2024 04:38 )    Color: x / Appearance: x / SG: x / pH: x  Gluc: 157 mg/dL / Ketone: x  / Bili: x / Urobili: x   Blood: x / Protein: x / Nitrite: x   Leuk Esterase: x / RBC: x / WBC x   Sq Epi: x / Non Sq Epi: x / Bacteria: x        05-01 @ 07:01 - 05-02 @ 07:00  --------------------------------------------------------  IN: 400 mL / OUT: 0 mL / NET: 400 mL    05-02 @ 07:01  -  05-03 @ 01:20  --------------------------------------------------------  IN: 330 mL / OUT: 200 mL / NET: 130 mL        RADIOLOGY & ADDITIONAL TESTS:    < from: MR Brain Stereotactic w/wo IV Cont (05.02.24 @ 11:37) >  IMPRESSION: Thereis a 3.9 x 3.9 x 3.3 cm heterogeneously enhancing,   centrally necrotic mass in the right parietal lobe suspicious for   malignancy. The lesion demonstrates foci of susceptibility artifact   suggesting hemorrhage. There is extensive surrounding T2/FLAIR   hyperintensity suggesting vasogenic edema and/or nonenhancing tumor.   Right-to-left midline shift measuring 3 mm.    < from: MR Brain Stereotactic w/wo IV Cont (04.20.24 @ 03:10) >  IMPRESSION:  3.7 cm TRV by 4.2 cm AP by 4.2 cm cc necrotic enhancing   heterogeneous mass in the posterior RIGHT frontal lobe with moderate   edema is suspicious for a high-grade glial neoplasm. There is mild   effacement of the RIGHT lateral ventricle with 4.6 mm subfalcine   herniation to the LEFT.    < from: CT Angio Head w/ IV Cont (04.19.24 @ 21:02) >  IMPRESSION:    CT head: Right frontoparietal vasogenic edema secondary to underlying   enhancing cystic mass. Abscess is in the differential. This can be   further characterized with contrast-enhanced brain MRI.    CT ANGIOGRAPHY NECK: Patent cervical vasculature.  No hemodynamically   significant carotid stenosis or flow-limiting vertebral artery stenosis.    No evidence of dissection.    CT ANGIOGRAPHY BRAIN: No vessel occlusion, flow-limiting stenosis or   aneurysm.

## 2024-05-03 NOTE — PROGRESS NOTE ADULT - ASSESSMENT
Assessment:  68 year-old female with PMHx Afib on ASA, HTN, HLD presented for R posterior frontal mass, now s/p cerebral angiogram and admitted to neuro ICU for post op cares and preparation for OR in am.       Plan:  Neuro:  - Q1 hour Neuro checks, Q1 hour Vitals  - HOB 30 degrees, Neck midline position  - Maintain normothermia, PO acetaminophen for temp>38 C or pain  - Continue AED: keppra 500 BID  - Monitor groin  - Steroids: dex 4q12  - Pain management & Sedation: tylenol PRN   - Turn and Position Q2  /  Activity ad carina, with assistance  - 5-ALA in am in preparation for OR (7 am)  	  CV:  - SBP Goal   - BP regimen: diltiazem 180 daily  - Arterial line  - Atorvastatin 20, fenofibrate 145     Pulm:  - Supplemental O2 PRN to maintain Spo2>92%  - Chest PT, OOB, Pulmonary Toilet    GI:  - Nutrition: NPO @ Mn for OR in am   - GI prophylaxis: protonix while on steroids  - Bowel regimen: senna, miralax  - Last BM: 5/1   	  Gu:  - Robb   - Fluids: NS @ 70   - I&O Q1 hour  - Monitor Electrolytes & Renal Function    Heme:  - Monitor H&H  - Chemical DVT prophylaxis: * Chemical DVT prophylaxis is contraindicated due to risk of bleeding  - Mechanical DVT Prophylaxis: Maintain B/L LE sequential compression devices  	  ID:  - Monitor WBC and Temperature  		  Endo  - Monitor BGL, maintain <180  - MISS     68 year-old female with R frontal lobe brain mass with vasogenic edema/mass effect.  S/p elective mass resection with 5-ALA.   PMH Afib on ASA, HTN, HLD.    Plan:  neurochecks  pain control  stability CTh  Continue AED: keppra 500 BID  Dexa 4q12, cont  pain control, avoid oversedation  SBP Goal , cont Diltiazem 180 daily for rate control  cont Atorvastatin 20, check lipid panel  dysphagia screen, BM regimen, PPI for ppx  Monitor Electrolytes & Renal Function  SCDs, hold anti-thrombotics bautista-op

## 2024-05-04 LAB
ANION GAP SERPL CALC-SCNC: 10 MMOL/L — SIGNIFICANT CHANGE UP (ref 5–17)
BUN SERPL-MCNC: 22.6 MG/DL — HIGH (ref 8–20)
CALCIUM SERPL-MCNC: 7.6 MG/DL — LOW (ref 8.4–10.5)
CHLORIDE SERPL-SCNC: 108 MMOL/L — SIGNIFICANT CHANGE UP (ref 96–108)
CHOLEST SERPL-MCNC: 170 MG/DL — SIGNIFICANT CHANGE UP
CO2 SERPL-SCNC: 25 MMOL/L — SIGNIFICANT CHANGE UP (ref 22–29)
CREAT SERPL-MCNC: 0.44 MG/DL — LOW (ref 0.5–1.3)
EGFR: 105 ML/MIN/1.73M2 — SIGNIFICANT CHANGE UP
GLUCOSE BLDC GLUCOMTR-MCNC: 129 MG/DL — HIGH (ref 70–99)
GLUCOSE BLDC GLUCOMTR-MCNC: 145 MG/DL — HIGH (ref 70–99)
GLUCOSE BLDC GLUCOMTR-MCNC: 151 MG/DL — HIGH (ref 70–99)
GLUCOSE SERPL-MCNC: 152 MG/DL — HIGH (ref 70–99)
HCT VFR BLD CALC: 26.4 % — LOW (ref 34.5–45)
HCT VFR BLD CALC: 28.1 % — LOW (ref 34.5–45)
HDLC SERPL-MCNC: 53 MG/DL — SIGNIFICANT CHANGE UP
HGB BLD-MCNC: 8.9 G/DL — LOW (ref 11.5–15.5)
HGB BLD-MCNC: 9.3 G/DL — LOW (ref 11.5–15.5)
LIPID PNL WITH DIRECT LDL SERPL: 88 MG/DL — SIGNIFICANT CHANGE UP
MAGNESIUM SERPL-MCNC: 2.2 MG/DL — SIGNIFICANT CHANGE UP (ref 1.6–2.6)
MCHC RBC-ENTMCNC: 30 PG — SIGNIFICANT CHANGE UP (ref 27–34)
MCHC RBC-ENTMCNC: 30.3 PG — SIGNIFICANT CHANGE UP (ref 27–34)
MCHC RBC-ENTMCNC: 33.1 GM/DL — SIGNIFICANT CHANGE UP (ref 32–36)
MCHC RBC-ENTMCNC: 33.7 GM/DL — SIGNIFICANT CHANGE UP (ref 32–36)
MCV RBC AUTO: 89.8 FL — SIGNIFICANT CHANGE UP (ref 80–100)
MCV RBC AUTO: 90.6 FL — SIGNIFICANT CHANGE UP (ref 80–100)
NON HDL CHOLESTEROL: 117 MG/DL — SIGNIFICANT CHANGE UP
PHOSPHATE SERPL-MCNC: 3.2 MG/DL — SIGNIFICANT CHANGE UP (ref 2.4–4.7)
PLATELET # BLD AUTO: 140 K/UL — LOW (ref 150–400)
PLATELET # BLD AUTO: 156 K/UL — SIGNIFICANT CHANGE UP (ref 150–400)
POTASSIUM SERPL-MCNC: 3.9 MMOL/L — SIGNIFICANT CHANGE UP (ref 3.5–5.3)
POTASSIUM SERPL-SCNC: 3.9 MMOL/L — SIGNIFICANT CHANGE UP (ref 3.5–5.3)
RBC # BLD: 2.94 M/UL — LOW (ref 3.8–5.2)
RBC # BLD: 3.1 M/UL — LOW (ref 3.8–5.2)
RBC # FLD: 13 % — SIGNIFICANT CHANGE UP (ref 10.3–14.5)
RBC # FLD: 13.2 % — SIGNIFICANT CHANGE UP (ref 10.3–14.5)
SODIUM SERPL-SCNC: 142 MMOL/L — SIGNIFICANT CHANGE UP (ref 135–145)
TRIGL SERPL-MCNC: 146 MG/DL — SIGNIFICANT CHANGE UP
TROPONIN T, HIGH SENSITIVITY RESULT: <6 NG/L — SIGNIFICANT CHANGE UP (ref 0–51)
WBC # BLD: 20.87 K/UL — HIGH (ref 3.8–10.5)
WBC # BLD: 26.28 K/UL — HIGH (ref 3.8–10.5)
WBC # FLD AUTO: 20.87 K/UL — HIGH (ref 3.8–10.5)
WBC # FLD AUTO: 26.28 K/UL — HIGH (ref 3.8–10.5)

## 2024-05-04 PROCEDURE — 70450 CT HEAD/BRAIN W/O DYE: CPT | Mod: 26

## 2024-05-04 PROCEDURE — 99232 SBSQ HOSP IP/OBS MODERATE 35: CPT

## 2024-05-04 RX ADMIN — Medication 4 MILLIGRAM(S): at 13:10

## 2024-05-04 RX ADMIN — LEVETIRACETAM 500 MILLIGRAM(S): 250 TABLET, FILM COATED ORAL at 17:38

## 2024-05-04 RX ADMIN — POLYETHYLENE GLYCOL 3350 17 GRAM(S): 17 POWDER, FOR SOLUTION ORAL at 13:10

## 2024-05-04 RX ADMIN — Medication 4 MILLIGRAM(S): at 00:17

## 2024-05-04 RX ADMIN — Medication 2000 MILLIGRAM(S): at 21:25

## 2024-05-04 RX ADMIN — LEVETIRACETAM 500 MILLIGRAM(S): 250 TABLET, FILM COATED ORAL at 06:10

## 2024-05-04 RX ADMIN — Medication 2000 MILLIGRAM(S): at 06:09

## 2024-05-04 RX ADMIN — CHLORHEXIDINE GLUCONATE 1 APPLICATION(S): 213 SOLUTION TOPICAL at 13:11

## 2024-05-04 RX ADMIN — SENNA PLUS 2 TABLET(S): 8.6 TABLET ORAL at 21:24

## 2024-05-04 RX ADMIN — Medication 4 MILLIGRAM(S): at 06:09

## 2024-05-04 RX ADMIN — Medication 2: at 16:54

## 2024-05-04 RX ADMIN — ATORVASTATIN CALCIUM 20 MILLIGRAM(S): 80 TABLET, FILM COATED ORAL at 21:24

## 2024-05-04 RX ADMIN — Medication 2000 MILLIGRAM(S): at 13:10

## 2024-05-04 RX ADMIN — Medication 4 MILLIGRAM(S): at 17:38

## 2024-05-04 RX ADMIN — PANTOPRAZOLE SODIUM 40 MILLIGRAM(S): 20 TABLET, DELAYED RELEASE ORAL at 06:09

## 2024-05-04 NOTE — PROGRESS NOTE ADULT - TIME BILLING
Time spent review of relevant history, clinical examination, review of data and images, discussion of treatment with the multidisciplinary team and any consultants involved in this patient’s care.
Time spent review of relevant history, clinical examination, review of data and images, discussion of treatment with the multidisciplinary team and any consultants involved in this patient’s care.

## 2024-05-04 NOTE — OCCUPATIONAL THERAPY INITIAL EVALUATION ADULT - ADDITIONAL COMMENTS
right handed  no DME  Pt has a bathtub with curtains
right handed  no DME  Pt has bathtub with curtains

## 2024-05-04 NOTE — PROGRESS NOTE ADULT - ASSESSMENT
68 year-old female with R frontal lobe brain mass with vasogenic edema/mass effect.  S/p elective mass resection with 5-ALA 05/03.  PMH Afib on ASA, HTN, HLD.  Anemia, postop. No signs of acute bleeding, hemodynamically stable.    Plan:  neurochecks  pain control  Continue AED: keppra 500 BID  Dexa 4q12, cont  pain control, avoid oversedation  SBP Goal , cont Diltiazem 180 daily for rate control  cont Atorvastatin 20, check lipid panel  dysphagia screen, BM regimen, PPI for ppx  Monitor Electrolytes & Renal Function  check CBC in am, earlier if becomes hypotensive  SCDs, hold anti-thrombotics bautista-op

## 2024-05-04 NOTE — PROGRESS NOTE ADULT - SUBJECTIVE AND OBJECTIVE BOX
HPI:  68 year-old female with PMHx Afib on ASA, HTN, HLD presents to [facility] for brain mass resection. Patient initially presented on 4/19/24 for 2 weeks of difficulty with fine motor skills (buttoning shirt, combing hair, holding utensils). CT Head at that time revealed a right frontoparietal mass for which neurosurgery was consulted. Pt was admitted to medicine for mets workup, CT CAP was negative. 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 Has been taking Keppra 500 BID for seizure ppx. Last dose of ASA was 4/28. Reports fine motor skills have been improving and otherwise feels well. Patient scheduled for R brain mass resection on 5/3/24 with Dr. Guaamn.  (30 Apr 2024 12:29)      INTERVAL HPI/OVERNIGHT EVENTS:  68y Female POD#1 from right frontoparietal mass tumor resection, seen lying comfortably in bed. Passed for diet. Robb in with clear urine. Denies headache, weakness, numbness, n/v/d, fevers, chills, chest pain, SOB.     Vital Signs Last 24 Hrs  T(C): 36.9 (03 May 2024 21:22), Max: 37.1 (03 May 2024 15:30)  T(F): 98.5 (03 May 2024 21:22), Max: 98.8 (03 May 2024 15:30)  HR: 66 (04 May 2024 00:00) (51 - 91)  BP: 97/68 (04 May 2024 00:00) (90/64 - 123/65)  BP(mean): 77 (04 May 2024 00:00) (65 - 86)  RR: 15 (04 May 2024 00:00) (6 - 18)  SpO2: 100% (04 May 2024 00:00) (96% - 100%)    Parameters below as of 04 May 2024 00:00  Patient On (Oxygen Delivery Method): room air        PHYSICAL EXAM:  GENERAL: NAD, well-groomed  HEAD:  Atraumatic, normocephalic  DRAINS: subgaleal drain to full suction seen with serosanguinous fluid   WOUND: Dressing clean dry intact  CHRIS COMA SCORE: E- V- M- = 14       E: 4= opens eyes spontaneously 3= to voice 2= to noxious 1= no opening       V: 5= oriented 4= confused 3= inappropriate words 2= incomprehensible sounds 1= nonverbal 1T= intubated       M: 6= follows commands 5= localizes 4= withdraws 3= flexor posturing 2= extensor posturing 1= no movement  MENTAL STATUS: AAO x3; Awake; Opens eyes to voice; Appropriately conversant without aphasia; following simple commands   CRANIAL NERVES: PERRL. EOMI without nystagmus. Facial sensation intact V1-3 distribution b/l. Face symmetric w/ normal eye closure and smile, tongue midline. Hearing grossly intact. Speech hypophonic.    REFLEXES: PERRL.   MOTOR: LUE HG3/5, LUE B/T/D 4/5, LLE 4/5 HF, LLE 5/5 D/P. Right 5/5  SENSATION: decreased sensation on left side   CHEST/LUNG: Chest rise and fall equally and bilaterally   HEART: +S1/+S2   ABDOMEN: Soft, nontender, nondistended   EXTREMITIES:  2+ peripheral pulses, no clubbing, cyanosis, or edema  SKIN: Warm, dry; no rashes or lesions    LABS:                        13.3   18.48 )-----------( 214      ( 03 May 2024 03:43 )             38.5     05-03    138  |  100  |  22.0<H>  ----------------------------<  148<H>  4.5   |  28.0  |  0.47<L>    Ca    8.6      03 May 2024 03:43  Phos  4.4     05-03  Mg     2.3     05-03    TPro  6.2<L>  /  Alb  3.8  /  TBili  0.5  /  DBili  x   /  AST  13  /  ALT  31  /  AlkPhos  76  05-02    PT/INR - ( 02 May 2024 04:38 )   PT: 9.7 sec;   INR: 0.87 ratio         PTT - ( 02 May 2024 04:38 )  PTT:34.7 sec  Urinalysis Basic - ( 03 May 2024 03:43 )    Color: x / Appearance: x / SG: x / pH: x  Gluc: 148 mg/dL / Ketone: x  / Bili: x / Urobili: x   Blood: x / Protein: x / Nitrite: x   Leuk Esterase: x / RBC: x / WBC x   Sq Epi: x / Non Sq Epi: x / Bacteria: x        05-02 @ 07:01  -  05-03 @ 07:00  --------------------------------------------------------  IN: 680 mL / OUT: 850 mL / NET: -170 mL    05-03 @ 07:01  - 05-04 @ 00:31  --------------------------------------------------------  IN: 820 mL / OUT: 915 mL / NET: -95 mL        RADIOLOGY & ADDITIONAL TESTS:  4/19 CTH: Right frontoparietal vasogenic edema with underlying 3.6cm x 4.6cm enhancing mass    4/19 CTA H/N: Negative    MRB stereotactic +/- : 3.7 cm x 4.2 cm x 4.2 cm necrotic enhancing heterogeneous mass in the posterior RIGHT frontal lobe w/ 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 Mild effacement of the RIGHT lateral ventricle with 4.6 mm subfalcine herniation to the LEFT.    CT CAP: Negative for METs    5/2 MRB stereotactic w/wo: 3.9 x 3.9 x 3.3 cm heterogeneously enhancing, centrally necrotic mass in R parietal lobe suspicious for malignancy. Demonstrates foci of susceptibility artifact suggesting hemorrhage. Right-to-left midline shift measuring 3 mm.    5/2 LED: negative    5/3 CTH: Expected changes     ASSESSMENT AND PLAN:   · Assessment	  68 year-old female with PMHx Afib on ASA, HTN, HLD presents to [facility] for brain mass resection. Known to NSGY service after initially presenting on 4/19/24 for 2 weeks of difficulty with fine motor skills. 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 Patient scheduled for R brain mass resection on 5/3/24 with Dr. Guaman.    Plan:  - Q1 neuro checks  - Pt s/p diagnsotic cerebral angiography w/o tumor embolization today with neuro IR (Dr. Mansfield)  - S/p tumor resection with Dr. Guaman  - Pending baseline LE dopplers and mrsa/mssa swabs negative  - Passed for diet   - holding lovenox   - Case and plan discussed with Dr. Mansfield

## 2024-05-04 NOTE — OCCUPATIONAL THERAPY INITIAL EVALUATION ADULT - PERTINENT HX OF CURRENT PROBLEM, REHAB EVAL
As per MD note: 68 year-old female with PMHx Afib on ASA, HTN, HLD presents to [facility] for brain mass resection. Patient initially presented on 4/19/24 for 2 weeks of difficulty with fine motor skills (buttoning shirt, combing hair, holding utensils). CT Head at that time revealed a right frontoparietal mass for which neurosurgery was consulted. Pt was admitted to medicine for mets workup, CT CAP was negative. 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 Patient was sent home on anti-epileptics and steroids. She is now admitted for  R brain mass resection on 5/3/24 with Dr. Guaman.   Patient seen and examined on 4 tower. Offers no complaints. States she is pretty active with walking and doing yard work. Denies getting chest pain or shortness of breath with exertion. Notes she has prediabetes and afib only on aspirin. Has not seen a cardiologist in over 10 years. Her afib has been controlled on Cardizem and aspirin which gets prescribed by her PCP Dr. Stark.
As per MD note: 68 year-old female with PMHx Afib on ASA, HTN, HLD presents to [facility] for brain mass resection. Patient initially presented on 4/19/24 for 2 weeks of difficulty with fine motor skills (buttoning shirt, combing hair, holding utensils). CT Head at that time revealed a right frontoparietal mass for which neurosurgery was consulted. Pt was admitted to medicine for mets workup, CT CAP was negative. 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 Has been taking Keppra 500 BID for seizure ppx. Last dose of ASA was 4/28. Reports fine motor skills have been improving and otherwise feels well. Patient scheduled for R brain mass resection on 5/3/24 with Dr. Guaman.  (30 Apr 2024 12:29)  Today, she underwent cerebral angiogram which showed R posterior frontal tumor with filling from RMCA, no embolization was performed. Admitted to NSICU for post op cares with planned tumor resection tomorrow 5/3.

## 2024-05-04 NOTE — PROGRESS NOTE ADULT - SUBJECTIVE AND OBJECTIVE BOX
.68 year-old female with PMHx Afib on ASA, HTN, HLD presents to [facility] for brain mass resection. Patient initially presented on 4/19/24 for 2 weeks of difficulty with fine motor skills (buttoning shirt, combing hair, holding utensils). CT Head at that time revealed a right frontoparietal mass for which neurosurgery was consulted. Pt was admitted to medicine for mets workup, CT CAP was negative. 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 Has been taking Keppra 500 BID for seizure ppx. Last dose of ASA was 4/28. Reports fine motor skills have been improving and otherwise feels well. Patient scheduled for R brain mass resection on 5/3/24 with Dr. Guaman.  (30 Apr 2024 12:29)  Today, she underwent cerebral angiogram which showed R posterior frontal tumor with filling from RMCA, no embolization was performed. Admitted to NSICU for post op cares with planned tumor resection tomorrow 5/3. (05/03/2024)    O/n events: none reported.       ICU Vital Signs Last 24 Hrs  T(C): 37.5 (04 May 2024 17:00), Max: 37.7 (04 May 2024 04:22)  T(F): 99.5 (04 May 2024 17:00), Max: 99.9 (04 May 2024 04:22)  HR: 94 (04 May 2024 17:00) (60 - 94)  BP: 127/70 (04 May 2024 17:00) (97/68 - 133/54)  BP(mean): 87 (04 May 2024 17:00) (66 - 87)  ABP: 114/56 (04 May 2024 12:00) (104/43 - 131/66)  ABP(mean): 81 (04 May 2024 12:00) (66 - 92)  RR: 21 (04 May 2024 17:00) (12 - 21)  SpO2: 99% (04 May 2024 17:00) (96% - 100%)    O2 Parameters below as of 04 May 2024 16:00  Patient On (Oxygen Delivery Method): room air        Physical Exam:  Constitutional: NAD, lying in bed  Neuro  * Mental Status:  GCS 15: Awake, alert, oriented to conversation. No aphasia or difficulty speaking. No dysarthria.  * Cranial Nerves: PERRL, EOMI, tongue midline, no gaze deviation  * Motor: RUE 5/5, LUE 4/5 with trace drift, RLE 5/5, LLE 5/5,* Sensory: Sensation intact to light touch     Cardiovascular: S1S2 present  Respiratory: non labored breathing, CTAB  Gastrointestinal: Soft, nontender, nondistended.  No peripheral edema.

## 2024-05-05 LAB
ANION GAP SERPL CALC-SCNC: 8 MMOL/L — SIGNIFICANT CHANGE UP (ref 5–17)
BUN SERPL-MCNC: 23.4 MG/DL — HIGH (ref 8–20)
CALCIUM SERPL-MCNC: 7.8 MG/DL — LOW (ref 8.4–10.5)
CHLORIDE SERPL-SCNC: 107 MMOL/L — SIGNIFICANT CHANGE UP (ref 96–108)
CO2 SERPL-SCNC: 26 MMOL/L — SIGNIFICANT CHANGE UP (ref 22–29)
CREAT SERPL-MCNC: 0.41 MG/DL — LOW (ref 0.5–1.3)
EGFR: 106 ML/MIN/1.73M2 — SIGNIFICANT CHANGE UP
GLUCOSE BLDC GLUCOMTR-MCNC: 148 MG/DL — HIGH (ref 70–99)
GLUCOSE BLDC GLUCOMTR-MCNC: 151 MG/DL — HIGH (ref 70–99)
GLUCOSE BLDC GLUCOMTR-MCNC: 156 MG/DL — HIGH (ref 70–99)
GLUCOSE BLDC GLUCOMTR-MCNC: 192 MG/DL — HIGH (ref 70–99)
GLUCOSE SERPL-MCNC: 144 MG/DL — HIGH (ref 70–99)
HCT VFR BLD CALC: 25.6 % — LOW (ref 34.5–45)
HGB BLD-MCNC: 8.5 G/DL — LOW (ref 11.5–15.5)
MAGNESIUM SERPL-MCNC: 2.3 MG/DL — SIGNIFICANT CHANGE UP (ref 1.6–2.6)
MCHC RBC-ENTMCNC: 30 PG — SIGNIFICANT CHANGE UP (ref 27–34)
MCHC RBC-ENTMCNC: 33.2 GM/DL — SIGNIFICANT CHANGE UP (ref 32–36)
MCV RBC AUTO: 90.5 FL — SIGNIFICANT CHANGE UP (ref 80–100)
PHOSPHATE SERPL-MCNC: 2.3 MG/DL — LOW (ref 2.4–4.7)
PLATELET # BLD AUTO: 124 K/UL — LOW (ref 150–400)
POTASSIUM SERPL-MCNC: 4.2 MMOL/L — SIGNIFICANT CHANGE UP (ref 3.5–5.3)
POTASSIUM SERPL-SCNC: 4.2 MMOL/L — SIGNIFICANT CHANGE UP (ref 3.5–5.3)
RBC # BLD: 2.83 M/UL — LOW (ref 3.8–5.2)
RBC # FLD: 13 % — SIGNIFICANT CHANGE UP (ref 10.3–14.5)
SODIUM SERPL-SCNC: 141 MMOL/L — SIGNIFICANT CHANGE UP (ref 135–145)
WBC # BLD: 17.71 K/UL — HIGH (ref 3.8–10.5)
WBC # FLD AUTO: 17.71 K/UL — HIGH (ref 3.8–10.5)

## 2024-05-05 PROCEDURE — 99232 SBSQ HOSP IP/OBS MODERATE 35: CPT

## 2024-05-05 RX ORDER — POTASSIUM PHOSPHATE, MONOBASIC POTASSIUM PHOSPHATE, DIBASIC 236; 224 MG/ML; MG/ML
15 INJECTION, SOLUTION INTRAVENOUS ONCE
Refills: 0 | Status: COMPLETED | OUTPATIENT
Start: 2024-05-05 | End: 2024-05-05

## 2024-05-05 RX ADMIN — Medication 4 MILLIGRAM(S): at 05:25

## 2024-05-05 RX ADMIN — SENNA PLUS 2 TABLET(S): 8.6 TABLET ORAL at 22:48

## 2024-05-05 RX ADMIN — Medication 2000 MILLIGRAM(S): at 22:47

## 2024-05-05 RX ADMIN — Medication 2: at 18:02

## 2024-05-05 RX ADMIN — POTASSIUM PHOSPHATE, MONOBASIC POTASSIUM PHOSPHATE, DIBASIC 62.5 MILLIMOLE(S): 236; 224 INJECTION, SOLUTION INTRAVENOUS at 07:43

## 2024-05-05 RX ADMIN — LEVETIRACETAM 500 MILLIGRAM(S): 250 TABLET, FILM COATED ORAL at 17:32

## 2024-05-05 RX ADMIN — Medication 4 MILLIGRAM(S): at 17:32

## 2024-05-05 RX ADMIN — Medication 2000 MILLIGRAM(S): at 13:02

## 2024-05-05 RX ADMIN — PANTOPRAZOLE SODIUM 40 MILLIGRAM(S): 20 TABLET, DELAYED RELEASE ORAL at 07:42

## 2024-05-05 RX ADMIN — Medication 2000 MILLIGRAM(S): at 07:42

## 2024-05-05 RX ADMIN — ATORVASTATIN CALCIUM 20 MILLIGRAM(S): 80 TABLET, FILM COATED ORAL at 22:47

## 2024-05-05 RX ADMIN — Medication 2: at 07:43

## 2024-05-05 RX ADMIN — Medication 4 MILLIGRAM(S): at 00:17

## 2024-05-05 RX ADMIN — CHLORHEXIDINE GLUCONATE 1 APPLICATION(S): 213 SOLUTION TOPICAL at 13:02

## 2024-05-05 RX ADMIN — LEVETIRACETAM 500 MILLIGRAM(S): 250 TABLET, FILM COATED ORAL at 05:25

## 2024-05-05 RX ADMIN — Medication 4 MILLIGRAM(S): at 13:02

## 2024-05-05 NOTE — PROGRESS NOTE ADULT - ASSESSMENT
68 year-old female presents to [facility] for brain mass resection. 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 Patients/p  R brain mass resection on 5/3/24 with Dr. Guaman.    Plan:  - Q1 neuro checks  -pain control as needed, avoid over sedation   -pending AM CT brain  -Continue AED: keppra 500 BID  -Dexa 4q12, cont  - holding lovenox   -GLENN to full suction  - further recommendations pending AM rounds

## 2024-05-05 NOTE — PROGRESS NOTE ADULT - SUBJECTIVE AND OBJECTIVE BOX
HPI:  68 year-old female with PMHx Afib on ASA, HTN, HLD presents to [facility] for brain mass resection. Patient initially presented on 4/19/24 for 2 weeks of difficulty with fine motor skills (buttoning shirt, combing hair, holding utensils). CT Head at that time revealed a right frontoparietal mass for which neurosurgery was consulted. Pt was admitted to medicine for mets workup, CT CAP was negative. 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 Has been taking Keppra 500 BID for seizure ppx. Last dose of ASA was 4/28. Reports fine motor skills have been improving and otherwise feels well. Patient scheduled for R brain mass resection on 5/3/24 with Dr. Guaman.  (30 Apr 2024 12:29)      INTERVAL HPI/OVERNIGHT EVENTS:  68y Female POD#2 from right frontoparietal mass tumor resection, seen lying comfortably in bed, in dark 2/2 5ALA. S/P straight cath overnight after harp D/C. Denies headache, weakness, numbness, n/v/d, fevers, chills, chest pain, SOB.       Vital Signs Last 24 Hrs  T(C): 36.9 (05 May 2024 04:00), Max: 37.5 (04 May 2024 06:00)  T(F): 98.4 (05 May 2024 04:00), Max: 99.5 (04 May 2024 06:00)  HR: 67 (05 May 2024 04:00) (59 - 94)  BP: 117/59 (05 May 2024 04:00) (111/56 - 140/70)  BP(mean): 77 (05 May 2024 04:00) (71 - 89)  RR: 13 (05 May 2024 04:00) (12 - 24)  SpO2: 98% (05 May 2024 04:00) (98% - 100%)    Parameters below as of 05 May 2024 04:00  Patient On (Oxygen Delivery Method): room air      PHYSICAL EXAM:  GENERAL: NAD, well-groomed  HEAD:  Atraumatic, normocephalic  WOUND: Dressing clean dry intact  MENTAL STATUS: AAO x3; Awake; Opens eyes spontaneously; Appropriately conversant without aphasia; following simple commands  CRANIAL NERVES: LIDIA; EOMI; no facial asymmetry; facial sensation grossly intact to light touch b/l  MOTOR: LUE tri 4+/5, otherwise strength 5/5 B/L upper and lower extremities; sensation grossly intact all extremities  CHEST/LUNG: Clear to auscultation bilaterally; no rales, rhonchi, wheezing, or rubs  HEART: +S1/+S2; Regular rate and rhythm; no murmurs, rubs, or gallops  ABDOMEN: Soft, nontender, nondistended; bowel sounds present all four quadrants  EXTREMITIES:  2+ peripheral pulses, no clubbing, cyanosis, or edema  SKIN: Warm, dry; no rashes or lesions      LABS:                        8.9    20.87 )-----------( 140      ( 04 May 2024 13:30 )             26.4     05-04    142  |  108  |  22.6<H>  ----------------------------<  152<H>  3.9   |  25.0  |  0.44<L>    Ca    7.6<L>      04 May 2024 03:04  Phos  3.2     05-04  Mg     2.2     05-04        Urinalysis Basic - ( 04 May 2024 03:04 )    Color: x / Appearance: x / SG: x / pH: x  Gluc: 152 mg/dL / Ketone: x  / Bili: x / Urobili: x   Blood: x / Protein: x / Nitrite: x   Leuk Esterase: x / RBC: x / WBC x   Sq Epi: x / Non Sq Epi: x / Bacteria: x        05-03 @ 07:01 - 05-04 @ 07:00  --------------------------------------------------------  IN: 1240 mL / OUT: 1540 mL / NET: -300 mL    05-04 @ 07:01  -  05-05 @ 04:51  --------------------------------------------------------  IN: 840 mL / OUT: 1260 mL / NET: -420 mL        RADIOLOGY & ADDITIONAL TESTS:

## 2024-05-05 NOTE — CHART NOTE - NSCHARTNOTEFT_GEN_A_CORE
Nutrition Note: Aware pt downgraded from ICU to neurosurgery stepdown. Chart reviewed; RD to follow up with full nutrition assessment per medical floor protocol.

## 2024-05-05 NOTE — CHART NOTE - NSCHARTNOTEFT_GEN_A_CORE
NSICU DOWNGRADE NOTE     HPI:  68 year-old female with PMHx Afib on ASA, HTN, HLD presents to [facility] for brain mass resection. Patient initially presented on 4/19/24 for 2 weeks of difficulty with fine motor skills (buttoning shirt, combing hair, holding utensils). CT Head at that time revealed a right frontoparietal mass for which neurosurgery was consulted. Pt was admitted to medicine for mets workup, CT CAP was negative. 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 Has been taking Keppra 500 BID for seizure ppx. Last dose of ASA was 4/28. Reports fine motor skills have been improving and otherwise feels well. Patient scheduled for R brain mass resection on 5/3/24 with Dr. Guaman.  (30 Apr 2024 12:29)    OVERNIGHT EVENTS: Patient examined at bedside, denies any acute complaints. No acute events    Vital Signs Last 24 Hrs  T(C): 36.9 (05 May 2024 04:00), Max: 37.5 (04 May 2024 14:00)  T(F): 98.4 (05 May 2024 04:00), Max: 99.5 (04 May 2024 14:00)  HR: 56 (05 May 2024 07:00) (55 - 94)  BP: 143/62 (05 May 2024 07:00) (111/56 - 143/62)  BP(mean): 84 (05 May 2024 07:00) (71 - 89)  RR: 13 (05 May 2024 07:00) (12 - 24)  SpO2: 98% (05 May 2024 07:00) (98% - 100%)    Parameters below as of 05 May 2024 04:00  Patient On (Oxygen Delivery Method): room air      I&O's Summary    04 May 2024 07:01  -  05 May 2024 07:00  --------------------------------------------------------  IN: 840 mL / OUT: 1290 mL / NET: -450 mL        PHYSICAL EXAM:  Gen:  HEENT:  Neck:  Lungs:  Heart:  Abd:  Exts:  Neuro:  Wound/drains:    TUBES/LINES:  [] Robb  [] Wound Drains  [] Others      DIET:  [] NPO  [] Mechanical  [] Tube feeds    LABS:                        8.5    17.71 )-----------( 124      ( 05 May 2024 04:30 )             25.6     05-05    141  |  107  |  23.4<H>  ----------------------------<  144<H>  4.2   |  26.0  |  0.41<L>    Ca    7.8<L>      05 May 2024 04:30  Phos  2.3     05-05  Mg     2.3     05-05        Urinalysis Basic - ( 05 May 2024 04:30 )    Color: x / Appearance: x / SG: x / pH: x  Gluc: 144 mg/dL / Ketone: x  / Bili: x / Urobili: x   Blood: x / Protein: x / Nitrite: x   Leuk Esterase: x / RBC: x / WBC x   Sq Epi: x / Non Sq Epi: x / Bacteria: x          CAPILLARY BLOOD GLUCOSE      POCT Blood Glucose.: 156 mg/dL (05 May 2024 07:41)  POCT Blood Glucose.: 151 mg/dL (04 May 2024 16:34)  POCT Blood Glucose.: 129 mg/dL (04 May 2024 12:16)  POCT Blood Glucose.: 145 mg/dL (04 May 2024 08:00)      Drug Levels: [] N/A    CSF Analysis: [] N/A      Allergies    oxycodone (Unknown)    Intolerances      MEDICATIONS:  Antibiotics:  ceFAZolin  Injectable. 2000 milliGRAM(s) IV Push every 8 hours  ceFAZolin  Injectable. 2000 milliGRAM(s) IV Push once    Neuro:  acetaminophen     Tablet .. 650 milliGRAM(s) Oral every 6 hours PRN  acetaminophen   IVPB .. 1000 milliGRAM(s) IV Intermittent every 6 hours PRN  HYDROmorphone  Injectable 0.5 milliGRAM(s) IV Push every 6 hours PRN  levETIRAcetam 500 milliGRAM(s) Oral every 12 hours  ondansetron Injectable 4 milliGRAM(s) IV Push every 6 hours PRN  traMADol 25 milliGRAM(s) Oral every 4 hours PRN  traMADol 50 milliGRAM(s) Oral every 4 hours PRN    Anticoagulation:    OTHER:  atorvastatin 20 milliGRAM(s) Oral at bedtime  chlorhexidine 2% Cloths 1 Application(s) Topical daily  dexAMETHasone  Injectable 4 milliGRAM(s) IV Push every 6 hours  dextrose 50% Injectable 25 Gram(s) IV Push once  dextrose 50% Injectable 12.5 Gram(s) IV Push once  diltiazem    milliGRAM(s) Oral daily  hydrALAZINE Injectable 10 milliGRAM(s) IV Push every 2 hours PRN  influenza  Vaccine (HIGH DOSE) 0.7 milliLiter(s) IntraMuscular once  insulin lispro (ADMELOG) corrective regimen sliding scale   SubCutaneous three times a day before meals  labetalol Injectable 10 milliGRAM(s) IV Push every 2 hours PRN  pantoprazole    Tablet 40 milliGRAM(s) Oral before breakfast  polyethylene glycol 3350 17 Gram(s) Oral daily  senna 2 Tablet(s) Oral at bedtime    IVF:  potassium phosphate IVPB 15 milliMole(s) IV Intermittent once  sodium chloride 0.9%. 1000 milliLiter(s) IV Continuous <Continuous>    CULTURES:    RADIOLOGY & ADDITIONAL TESTS:      ASSESSMENT:  68y Female s/p    PLAN:  NEURO:    CARDIOVASCULAR:    PULMONARY:    RENAL:    GI:    HEME:    ID:    ENDO:    DVT PROPHYLAXIS:  [] Venodynes                                [] Heparin/Lovenox    DISPOSITION: NSICU DOWNGRADE NOTE     HPI:  68 year-old female with PMHx Afib on ASA, HTN, HLD presents to [facility] for brain mass resection. Patient initially presented on 4/19/24 for 2 weeks of difficulty with fine motor skills (buttoning shirt, combing hair, holding utensils). CT Head at that time revealed a right frontoparietal mass for which neurosurgery was consulted. Pt was admitted to medicine for mets workup, CT CAP was negative. 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 Has been taking Keppra 500 BID for seizure ppx. Last dose of ASA was 4/28. Reports fine motor skills have been improving and otherwise feels well. Patient scheduled for R brain mass resection on 5/3/24 with Dr. Guaman.  (30 Apr 2024 12:29)    OVERNIGHT EVENTS: Patient examined at bedside, denies any acute complaints. No acute events overnight.     Vital Signs Last 24 Hrs  T(C): 36.9 (05 May 2024 04:00), Max: 37.5 (04 May 2024 14:00)  T(F): 98.4 (05 May 2024 04:00), Max: 99.5 (04 May 2024 14:00)  HR: 56 (05 May 2024 07:00) (55 - 94)  BP: 143/62 (05 May 2024 07:00) (111/56 - 143/62)  BP(mean): 84 (05 May 2024 07:00) (71 - 89)  RR: 13 (05 May 2024 07:00) (12 - 24)  SpO2: 98% (05 May 2024 07:00) (98% - 100%)    Parameters below as of 05 May 2024 04:00  Patient On (Oxygen Delivery Method): room air      I&O's Summary    04 May 2024 07:01  -  05 May 2024 07:00  --------------------------------------------------------  IN: 840 mL / OUT: 1290 mL / NET: -450 mL        PHYSICAL EXAM:  General: NAD, pt is comfortably sitting up in bed, on room air  HEENT: EOMI b/l, face symmetric, tongue midline, neck FROM  Cardiovascular: Regular rate and rhythm  Respiratory: nonlabored breathing, normal chest rise  GI: abdomen soft, nontender, nondistended  Neuro: AOx3, hypophonic but improving with wakefulness, EOMI, PERRL, FC x4, LUE HG 4+/5, tri 4+/5, bi -5/5, otherwise 5/5, dec sensation on Lt side  Extremities: distal pulses 2+ x4  Wound/incision: crani site c/d/i   Drain: subgaleal JPx1         LABS:                        8.5    17.71 )-----------( 124      ( 05 May 2024 04:30 )             25.6     05-05    141  |  107  |  23.4<H>  ----------------------------<  144<H>  4.2   |  26.0  |  0.41<L>    Ca    7.8<L>      05 May 2024 04:30  Phos  2.3     05-05  Mg     2.3     05-05  Urinalysis Basic - ( 05 May 2024 04:30 )  Color: x / Appearance: x / SG: x / pH: x  Gluc: 144 mg/dL / Ketone: x  / Bili: x / Urobili: x   Blood: x / Protein: x / Nitrite: x   Leuk Esterase: x / RBC: x / WBC x   Sq Epi: x / Non Sq Epi: x / Bacteria: x      CAPILLARY BLOOD GLUCOSE  POCT Blood Glucose.: 156 mg/dL (05 May 2024 07:41)  POCT Blood Glucose.: 151 mg/dL (04 May 2024 16:34)  POCT Blood Glucose.: 129 mg/dL (04 May 2024 12:16)  POCT Blood Glucose.: 145 mg/dL (04 May 2024 08:00)      Allergies  oxycodone (Unknown)    MEDICATIONS:  ceFAZolin  Injectable. 2000 milliGRAM(s) IV Push every 8 hours  ceFAZolin  Injectable. 2000 milliGRAM(s) IV Push once  acetaminophen     Tablet .. 650 milliGRAM(s) Oral every 6 hours PRN  acetaminophen   IVPB .. 1000 milliGRAM(s) IV Intermittent every 6 hours PRN  HYDROmorphone  Injectable 0.5 milliGRAM(s) IV Push every 6 hours PRN  levETIRAcetam 500 milliGRAM(s) Oral every 12 hours  ondansetron Injectable 4 milliGRAM(s) IV Push every 6 hours PRN  traMADol 25 milliGRAM(s) Oral every 4 hours PRN  traMADol 50 milliGRAM(s) Oral every 4 hours PRN  atorvastatin 20 milliGRAM(s) Oral at bedtime  chlorhexidine 2% Cloths 1 Application(s) Topical daily  dexAMETHasone  Injectable 4 milliGRAM(s) IV Push every 6 hours  dextrose 50% Injectable 25 Gram(s) IV Push once  dextrose 50% Injectable 12.5 Gram(s) IV Push once  diltiazem    milliGRAM(s) Oral daily  hydrALAZINE Injectable 10 milliGRAM(s) IV Push every 2 hours PRN  influenza  Vaccine (HIGH DOSE) 0.7 milliLiter(s) IntraMuscular once  insulin lispro (ADMELOG) corrective regimen sliding scale   SubCutaneous three times a day before meals  labetalol Injectable 10 milliGRAM(s) IV Push every 2 hours PRN  pantoprazole    Tablet 40 milliGRAM(s) Oral before breakfast  polyethylene glycol 3350 17 Gram(s) Oral daily  senna 2 Tablet(s) Oral at bedtime  potassium phosphate IVPB 15 milliMole(s) IV Intermittent once  sodium chloride 0.9%. 1000 milliLiter(s) IV Continuous <Continuous>      RADIOLOGY & ADDITIONAL TESTS:  < from: CT Head No Cont (05.04.24 @ 06:02) >  IMPRESSION:  Stable postoperative changes following right parietal craniotomy and   brain tumor resection.        ASSESSMENT: NSICU DOWNGRADE NOTE     HPI:  68 year-old female with PMHx Afib on ASA, HTN, HLD presents to [facility] for brain mass resection. Patient initially presented on 4/19/24 for 2 weeks of difficulty with fine motor skills (buttoning shirt, combing hair, holding utensils). CT Head at that time revealed a right frontoparietal mass for which neurosurgery was consulted. Pt was admitted to medicine for mets workup, CT CAP was negative. 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 Has been taking Keppra 500 BID for seizure ppx. Last dose of ASA was 4/28. Reports fine motor skills have been improving and otherwise feels well. Patient scheduled for R brain mass resection on 5/3/24 with Dr. Guaman.  (30 Apr 2024 12:29)    OVERNIGHT EVENTS: Patient examined at bedside, denies any acute complaints. No acute events overnight. GLENN remains in place.     Vital Signs Last 24 Hrs  T(C): 36.9 (05 May 2024 04:00), Max: 37.5 (04 May 2024 14:00)  T(F): 98.4 (05 May 2024 04:00), Max: 99.5 (04 May 2024 14:00)  HR: 56 (05 May 2024 07:00) (55 - 94)  BP: 143/62 (05 May 2024 07:00) (111/56 - 143/62)  BP(mean): 84 (05 May 2024 07:00) (71 - 89)  RR: 13 (05 May 2024 07:00) (12 - 24)  SpO2: 98% (05 May 2024 07:00) (98% - 100%)    Parameters below as of 05 May 2024 04:00  Patient On (Oxygen Delivery Method): room air      I&O's Summary    04 May 2024 07:01  -  05 May 2024 07:00  --------------------------------------------------------  IN: 840 mL / OUT: 1290 mL / NET: -450 mL        PHYSICAL EXAM:  General: NAD, pt is comfortably sitting up in bed, on room air  HEENT: EOMI b/l, face symmetric, tongue midline, neck FROM  Cardiovascular: Regular rate and rhythm  Respiratory: nonlabored breathing, normal chest rise  GI: abdomen soft, nontender, nondistended  Neuro: AOx3, PERRL, follows commands,  LUE T/HG 4+/5, tri 4+/5, bi -5/5  decreased sensation on Lt side  Extremities: distal pulses 2+ x4  Wound/incision: crani site c/d/i   Drain: subgaleal JPx1         LABS:                        8.5    17.71 )-----------( 124      ( 05 May 2024 04:30 )             25.6     05-05    141  |  107  |  23.4<H>  ----------------------------<  144<H>  4.2   |  26.0  |  0.41<L>    Ca    7.8<L>      05 May 2024 04:30  Phos  2.3     05-05  Mg     2.3     05-05  Urinalysis Basic - ( 05 May 2024 04:30 )  Color: x / Appearance: x / SG: x / pH: x  Gluc: 144 mg/dL / Ketone: x  / Bili: x / Urobili: x   Blood: x / Protein: x / Nitrite: x   Leuk Esterase: x / RBC: x / WBC x   Sq Epi: x / Non Sq Epi: x / Bacteria: x      CAPILLARY BLOOD GLUCOSE  POCT Blood Glucose.: 156 mg/dL (05 May 2024 07:41)  POCT Blood Glucose.: 151 mg/dL (04 May 2024 16:34)  POCT Blood Glucose.: 129 mg/dL (04 May 2024 12:16)  POCT Blood Glucose.: 145 mg/dL (04 May 2024 08:00)      Allergies  oxycodone (Unknown)    MEDICATIONS:  ceFAZolin  Injectable. 2000 milliGRAM(s) IV Push every 8 hours  ceFAZolin  Injectable. 2000 milliGRAM(s) IV Push once  acetaminophen     Tablet .. 650 milliGRAM(s) Oral every 6 hours PRN  acetaminophen   IVPB .. 1000 milliGRAM(s) IV Intermittent every 6 hours PRN  HYDROmorphone  Injectable 0.5 milliGRAM(s) IV Push every 6 hours PRN  levETIRAcetam 500 milliGRAM(s) Oral every 12 hours  ondansetron Injectable 4 milliGRAM(s) IV Push every 6 hours PRN  traMADol 25 milliGRAM(s) Oral every 4 hours PRN  traMADol 50 milliGRAM(s) Oral every 4 hours PRN  atorvastatin 20 milliGRAM(s) Oral at bedtime  chlorhexidine 2% Cloths 1 Application(s) Topical daily  dexAMETHasone  Injectable 4 milliGRAM(s) IV Push every 6 hours  dextrose 50% Injectable 25 Gram(s) IV Push once  dextrose 50% Injectable 12.5 Gram(s) IV Push once  diltiazem    milliGRAM(s) Oral daily  hydrALAZINE Injectable 10 milliGRAM(s) IV Push every 2 hours PRN  influenza  Vaccine (HIGH DOSE) 0.7 milliLiter(s) IntraMuscular once  insulin lispro (ADMELOG) corrective regimen sliding scale   SubCutaneous three times a day before meals  labetalol Injectable 10 milliGRAM(s) IV Push every 2 hours PRN  pantoprazole    Tablet 40 milliGRAM(s) Oral before breakfast  polyethylene glycol 3350 17 Gram(s) Oral daily  senna 2 Tablet(s) Oral at bedtime  potassium phosphate IVPB 15 milliMole(s) IV Intermittent once  sodium chloride 0.9%. 1000 milliLiter(s) IV Continuous <Continuous>      RADIOLOGY & ADDITIONAL TESTS:  < from: CT Head No Cont (05.04.24 @ 06:02) >  IMPRESSION:  Stable postoperative changes following right parietal craniotomy and   brain tumor resection.        ASSESSMENT: NSICU DOWNGRADE NOTE     HPI:  68 year-old female with PMHx Afib on ASA, HTN, HLD presents to [facility] for brain mass resection. Patient initially presented on 4/19/24 for 2 weeks of difficulty with fine motor skills (buttoning shirt, combing hair, holding utensils). CT Head at that time revealed a right frontoparietal mass for which neurosurgery was consulted. Pt was admitted to medicine for mets workup, CT CAP was negative. 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 Has been taking Keppra 500 BID for seizure ppx. Last dose of ASA was 4/28. Reports fine motor skills have been improving and otherwise feels well. Patient scheduled for R brain mass resection on 5/3/24 with Dr. Guaman.  (30 Apr 2024 12:29)    OVERNIGHT EVENTS: Patient examined at bedside, denies any acute complaints. No acute events overnight. GLENN remains in place.     Vital Signs Last 24 Hrs  T(C): 36.9 (05 May 2024 04:00), Max: 37.5 (04 May 2024 14:00)  T(F): 98.4 (05 May 2024 04:00), Max: 99.5 (04 May 2024 14:00)  HR: 56 (05 May 2024 07:00) (55 - 94)  BP: 143/62 (05 May 2024 07:00) (111/56 - 143/62)  BP(mean): 84 (05 May 2024 07:00) (71 - 89)  RR: 13 (05 May 2024 07:00) (12 - 24)  SpO2: 98% (05 May 2024 07:00) (98% - 100%)    Parameters below as of 05 May 2024 04:00  Patient On (Oxygen Delivery Method): room air      I&O's Summary    04 May 2024 07:01  -  05 May 2024 07:00  --------------------------------------------------------  IN: 840 mL / OUT: 1290 mL / NET: -450 mL        PHYSICAL EXAM:  General: NAD, pt is comfortably sitting up in bed, on room air  HEENT: EOMI b/l, face symmetric, tongue midline, neck FROM  Cardiovascular: Regular rate and rhythm  Respiratory: nonlabored breathing, normal chest rise  GI: abdomen soft, nontender, nondistended  Neuro: AOx3, PERRL, follows commands,  LUE T/HG 4+/5 otherwise 5/5 in all extremities   decreased sensation on Lt side  Extremities: distal pulses 2+ x4  Wound/incision: crani site c/d/i   Drain: subgaleal JPx1     LABS:                        8.5    17.71 )-----------( 124      ( 05 May 2024 04:30 )             25.6     05-05    141  |  107  |  23.4<H>  ----------------------------<  144<H>  4.2   |  26.0  |  0.41<L>    Ca    7.8<L>      05 May 2024 04:30  Phos  2.3     05-05  Mg     2.3     05-05  Urinalysis Basic - ( 05 May 2024 04:30 )  Color: x / Appearance: x / SG: x / pH: x  Gluc: 144 mg/dL / Ketone: x  / Bili: x / Urobili: x   Blood: x / Protein: x / Nitrite: x   Leuk Esterase: x / RBC: x / WBC x   Sq Epi: x / Non Sq Epi: x / Bacteria: x      CAPILLARY BLOOD GLUCOSE  POCT Blood Glucose.: 156 mg/dL (05 May 2024 07:41)  POCT Blood Glucose.: 151 mg/dL (04 May 2024 16:34)  POCT Blood Glucose.: 129 mg/dL (04 May 2024 12:16)  POCT Blood Glucose.: 145 mg/dL (04 May 2024 08:00)      Allergies  oxycodone (Unknown)    MEDICATIONS:  ceFAZolin  Injectable. 2000 milliGRAM(s) IV Push every 8 hours  ceFAZolin  Injectable. 2000 milliGRAM(s) IV Push once  acetaminophen     Tablet .. 650 milliGRAM(s) Oral every 6 hours PRN  acetaminophen   IVPB .. 1000 milliGRAM(s) IV Intermittent every 6 hours PRN  HYDROmorphone  Injectable 0.5 milliGRAM(s) IV Push every 6 hours PRN  levETIRAcetam 500 milliGRAM(s) Oral every 12 hours  ondansetron Injectable 4 milliGRAM(s) IV Push every 6 hours PRN  traMADol 25 milliGRAM(s) Oral every 4 hours PRN  traMADol 50 milliGRAM(s) Oral every 4 hours PRN  atorvastatin 20 milliGRAM(s) Oral at bedtime  chlorhexidine 2% Cloths 1 Application(s) Topical daily  dexAMETHasone  Injectable 4 milliGRAM(s) IV Push every 6 hours  dextrose 50% Injectable 25 Gram(s) IV Push once  dextrose 50% Injectable 12.5 Gram(s) IV Push once  diltiazem    milliGRAM(s) Oral daily  hydrALAZINE Injectable 10 milliGRAM(s) IV Push every 2 hours PRN  influenza  Vaccine (HIGH DOSE) 0.7 milliLiter(s) IntraMuscular once  insulin lispro (ADMELOG) corrective regimen sliding scale   SubCutaneous three times a day before meals  labetalol Injectable 10 milliGRAM(s) IV Push every 2 hours PRN  pantoprazole    Tablet 40 milliGRAM(s) Oral before breakfast  polyethylene glycol 3350 17 Gram(s) Oral daily  senna 2 Tablet(s) Oral at bedtime  potassium phosphate IVPB 15 milliMole(s) IV Intermittent once  sodium chloride 0.9%. 1000 milliLiter(s) IV Continuous <Continuous>      RADIOLOGY & ADDITIONAL TESTS:  < from: CT Head No Cont (05.04.24 @ 06:02) >  IMPRESSION:  Stable postoperative changes following right parietal craniotomy and   brain tumor resection.      ASSESSMENT:  68F PMHx of Afib on ASA, HTN, HLD presented to [facility] for brain mass resection. Patient initially presented 4/19/24 c/o 2 weeks of difficulty with fine motor skills (buttoning shirt, combing hair, holding utensils) found to have a right frontoparietal mass on CTH. Metastasis workup negative. 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 S/p R craniotomy for tumor resection with 5-ALA 5/3.    PLAN:   NEURO:  - neuro checks q2hrs   - Keppra 500 Q12  - Decadron 4q6  - subgaleal GLENN to full suction, monitor outputs   - pain control PRN: tylenol, dilaudid 0.5q6, tramadol 25/50  - pend postop MRI brain w+w/o contrast     CV:   - SBP goal <140   - diltiazem 180  - lipitor 20, LDL 88   - PRN: hydralazine, labetalol    Resp:   - tolerating RA  - incentive spirometry    GI:   - diet: DASH regular   - LBM: outpatient  - bowel regimen: Senna, miralax   - protonix while on steroids    - PRN: Zofran     :   - voiding, IVL    Heme:   - DVT ppx: SCDs    ID:   - Ancef while drain is in    Endo:  - no active issues     DISPO:   - downgraded to neurosurgery stepdown   - OT recc acute rehab     D/w Dr. Munguia, Dr. Mansfield and neurosurgery ACPs NSICU DOWNGRADE NOTE     HPI:  68 year-old female with PMHx Afib on ASA, HTN, HLD presents to [facility] for brain mass resection. Patient initially presented on 4/19/24 for 2 weeks of difficulty with fine motor skills (buttoning shirt, combing hair, holding utensils). CT Head at that time revealed a right frontoparietal mass for which neurosurgery was consulted. Pt was admitted to medicine for mets workup, CT CAP was negative. 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 Has been taking Keppra 500 BID for seizure ppx. Last dose of ASA was 4/28. Reports fine motor skills have been improving and otherwise feels well. Patient scheduled for R brain mass resection on 5/3/24 with Dr. Guaman.  (30 Apr 2024 12:29)    OVERNIGHT EVENTS: Patient examined at bedside, denies any acute complaints. No acute events overnight. GLENN remains in place.     Vital Signs Last 24 Hrs  T(C): 36.9 (05 May 2024 04:00), Max: 37.5 (04 May 2024 14:00)  T(F): 98.4 (05 May 2024 04:00), Max: 99.5 (04 May 2024 14:00)  HR: 56 (05 May 2024 07:00) (55 - 94)  BP: 143/62 (05 May 2024 07:00) (111/56 - 143/62)  BP(mean): 84 (05 May 2024 07:00) (71 - 89)  RR: 13 (05 May 2024 07:00) (12 - 24)  SpO2: 98% (05 May 2024 07:00) (98% - 100%)    Parameters below as of 05 May 2024 04:00  Patient On (Oxygen Delivery Method): room air      I&O's Summary    04 May 2024 07:01  -  05 May 2024 07:00  --------------------------------------------------------  IN: 840 mL / OUT: 1290 mL / NET: -450 mL        PHYSICAL EXAM:  General: NAD, pt is comfortably sitting up in bed, on room air  HEENT: EOMI b/l, face symmetric, tongue midline, neck FROM  Cardiovascular: Regular rate and rhythm  Respiratory: nonlabored breathing, normal chest rise  GI: abdomen soft, nontender, nondistended  Neuro: AOx3, PERRL, follows commands,  LUE T/HG 4+/5 otherwise 5/5 in all extremities   decreased sensation on L  Extremities: distal pulses 2+ x4  Wound/incision: crani site c/d/i   Drain: subgaleal JPx1     LABS:                        8.5    17.71 )-----------( 124      ( 05 May 2024 04:30 )             25.6     05-05    141  |  107  |  23.4<H>  ----------------------------<  144<H>  4.2   |  26.0  |  0.41<L>    Ca    7.8<L>      05 May 2024 04:30  Phos  2.3     05-05  Mg     2.3     05-05  Urinalysis Basic - ( 05 May 2024 04:30 )  Color: x / Appearance: x / SG: x / pH: x  Gluc: 144 mg/dL / Ketone: x  / Bili: x / Urobili: x   Blood: x / Protein: x / Nitrite: x   Leuk Esterase: x / RBC: x / WBC x   Sq Epi: x / Non Sq Epi: x / Bacteria: x      CAPILLARY BLOOD GLUCOSE  POCT Blood Glucose.: 156 mg/dL (05 May 2024 07:41)  POCT Blood Glucose.: 151 mg/dL (04 May 2024 16:34)  POCT Blood Glucose.: 129 mg/dL (04 May 2024 12:16)  POCT Blood Glucose.: 145 mg/dL (04 May 2024 08:00)      Allergies  oxycodone (Unknown)    MEDICATIONS:  ceFAZolin  Injectable. 2000 milliGRAM(s) IV Push every 8 hours  ceFAZolin  Injectable. 2000 milliGRAM(s) IV Push once  acetaminophen     Tablet .. 650 milliGRAM(s) Oral every 6 hours PRN  acetaminophen   IVPB .. 1000 milliGRAM(s) IV Intermittent every 6 hours PRN  HYDROmorphone  Injectable 0.5 milliGRAM(s) IV Push every 6 hours PRN  levETIRAcetam 500 milliGRAM(s) Oral every 12 hours  ondansetron Injectable 4 milliGRAM(s) IV Push every 6 hours PRN  traMADol 25 milliGRAM(s) Oral every 4 hours PRN  traMADol 50 milliGRAM(s) Oral every 4 hours PRN  atorvastatin 20 milliGRAM(s) Oral at bedtime  chlorhexidine 2% Cloths 1 Application(s) Topical daily  dexAMETHasone  Injectable 4 milliGRAM(s) IV Push every 6 hours  dextrose 50% Injectable 25 Gram(s) IV Push once  dextrose 50% Injectable 12.5 Gram(s) IV Push once  diltiazem    milliGRAM(s) Oral daily  hydrALAZINE Injectable 10 milliGRAM(s) IV Push every 2 hours PRN  influenza  Vaccine (HIGH DOSE) 0.7 milliLiter(s) IntraMuscular once  insulin lispro (ADMELOG) corrective regimen sliding scale   SubCutaneous three times a day before meals  labetalol Injectable 10 milliGRAM(s) IV Push every 2 hours PRN  pantoprazole    Tablet 40 milliGRAM(s) Oral before breakfast  polyethylene glycol 3350 17 Gram(s) Oral daily  senna 2 Tablet(s) Oral at bedtime  potassium phosphate IVPB 15 milliMole(s) IV Intermittent once  sodium chloride 0.9%. 1000 milliLiter(s) IV Continuous <Continuous>      RADIOLOGY & ADDITIONAL TESTS:  < from: CT Head No Cont (05.04.24 @ 06:02) >  IMPRESSION:  Stable postoperative changes following right parietal craniotomy and   brain tumor resection.      ASSESSMENT:  68F PMHx of Afib on ASA, HTN, HLD presented to [facility] for brain mass resection. Patient initially presented 4/19/24 c/o 2 weeks of difficulty with fine motor skills (buttoning shirt, combing hair, holding utensils) found to have a right frontoparietal mass on CTH. Metastasis workup negative. 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 S/p R craniotomy for tumor resection with 5-ALA 5/3.    PLAN:   NEURO:  - neuro checks q2hrs   - Keppra 500 Q12  - Decadron 4q6  - subgaleal GLENN to full suction, monitor outputs   - pain control PRN: tylenol, dilaudid 0.5q6, tramadol 25/50  - pend postop MRI brain w+w/o contrast     CV:   - SBP goal <140   - diltiazem 180  - lipitor 20, LDL 88   - PRN: hydralazine, labetalol    Resp:   - tolerating RA  - incentive spirometry    GI:   - diet: DASH regular   - LBM: outpatient  - bowel regimen: Senna, miralax   - protonix while on steroids    - PRN: Zofran     :   - voiding, IVL    Heme:   - DVT ppx: SCDs    ID:   - Ancef while drain is in    Endo:  - no active issues     DISPO:   - downgraded to neurosurgery stepdown   - OT recc acute rehab     D/w Dr. Munguia, Dr. Mansfield and neurosurgery ACPs NSICU DOWNGRADE NOTE     HPI:  68 year-old female with PMHx Afib on ASA, HTN, HLD presents to [facility] for brain mass resection. Patient initially presented on 4/19/24 for 2 weeks of difficulty with fine motor skills (buttoning shirt, combing hair, holding utensils). CT Head at that time revealed a right frontoparietal mass for which neurosurgery was consulted. Pt was admitted to medicine for mets workup, CT CAP was negative. 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 Has been taking Keppra 500 BID for seizure ppx. Last dose of ASA was 4/28. Reports fine motor skills have been improving and otherwise feels well. Patient scheduled for R brain mass resection on 5/3/24 with Dr. Guaman.  (30 Apr 2024 12:29)    OVERNIGHT EVENTS: Patient examined at bedside, denies any acute complaints. No acute events overnight. GLENN remains in place.     Vital Signs Last 24 Hrs  T(C): 36.9 (05 May 2024 04:00), Max: 37.5 (04 May 2024 14:00)  T(F): 98.4 (05 May 2024 04:00), Max: 99.5 (04 May 2024 14:00)  HR: 56 (05 May 2024 07:00) (55 - 94)  BP: 143/62 (05 May 2024 07:00) (111/56 - 143/62)  BP(mean): 84 (05 May 2024 07:00) (71 - 89)  RR: 13 (05 May 2024 07:00) (12 - 24)  SpO2: 98% (05 May 2024 07:00) (98% - 100%)    Parameters below as of 05 May 2024 04:00  Patient On (Oxygen Delivery Method): room air      I&O's Summary    04 May 2024 07:01  -  05 May 2024 07:00  --------------------------------------------------------  IN: 840 mL / OUT: 1290 mL / NET: -450 mL        PHYSICAL EXAM:  General: NAD, pt is comfortably sitting up in bed, on room air  HEENT: EOMI b/l, face symmetric, tongue midline, neck FROM  Cardiovascular: Regular rate and rhythm  Respiratory: nonlabored breathing, normal chest rise  GI: abdomen soft, nontender, nondistended  Neuro: AOx3, PERRL, follows commands,  LUE T/HG 4+/5 otherwise 5/5 in all extremities   decreased sensation on L  Extremities: distal pulses 2+ x4  Wound/incision: crani site c/d/i   Drain: subgaleal JPx1     LABS:                        8.5    17.71 )-----------( 124      ( 05 May 2024 04:30 )             25.6     05-05    141  |  107  |  23.4<H>  ----------------------------<  144<H>  4.2   |  26.0  |  0.41<L>    Ca    7.8<L>      05 May 2024 04:30  Phos  2.3     05-05  Mg     2.3     05-05  Urinalysis Basic - ( 05 May 2024 04:30 )  Color: x / Appearance: x / SG: x / pH: x  Gluc: 144 mg/dL / Ketone: x  / Bili: x / Urobili: x   Blood: x / Protein: x / Nitrite: x   Leuk Esterase: x / RBC: x / WBC x   Sq Epi: x / Non Sq Epi: x / Bacteria: x      CAPILLARY BLOOD GLUCOSE  POCT Blood Glucose.: 156 mg/dL (05 May 2024 07:41)  POCT Blood Glucose.: 151 mg/dL (04 May 2024 16:34)  POCT Blood Glucose.: 129 mg/dL (04 May 2024 12:16)  POCT Blood Glucose.: 145 mg/dL (04 May 2024 08:00)      Allergies  oxycodone (Unknown)    MEDICATIONS:  ceFAZolin  Injectable. 2000 milliGRAM(s) IV Push every 8 hours  ceFAZolin  Injectable. 2000 milliGRAM(s) IV Push once  acetaminophen     Tablet .. 650 milliGRAM(s) Oral every 6 hours PRN  acetaminophen   IVPB .. 1000 milliGRAM(s) IV Intermittent every 6 hours PRN  HYDROmorphone  Injectable 0.5 milliGRAM(s) IV Push every 6 hours PRN  levETIRAcetam 500 milliGRAM(s) Oral every 12 hours  ondansetron Injectable 4 milliGRAM(s) IV Push every 6 hours PRN  traMADol 25 milliGRAM(s) Oral every 4 hours PRN  traMADol 50 milliGRAM(s) Oral every 4 hours PRN  atorvastatin 20 milliGRAM(s) Oral at bedtime  chlorhexidine 2% Cloths 1 Application(s) Topical daily  dexAMETHasone  Injectable 4 milliGRAM(s) IV Push every 6 hours  dextrose 50% Injectable 25 Gram(s) IV Push once  dextrose 50% Injectable 12.5 Gram(s) IV Push once  diltiazem    milliGRAM(s) Oral daily  hydrALAZINE Injectable 10 milliGRAM(s) IV Push every 2 hours PRN  influenza  Vaccine (HIGH DOSE) 0.7 milliLiter(s) IntraMuscular once  insulin lispro (ADMELOG) corrective regimen sliding scale   SubCutaneous three times a day before meals  labetalol Injectable 10 milliGRAM(s) IV Push every 2 hours PRN  pantoprazole    Tablet 40 milliGRAM(s) Oral before breakfast  polyethylene glycol 3350 17 Gram(s) Oral daily  senna 2 Tablet(s) Oral at bedtime  potassium phosphate IVPB 15 milliMole(s) IV Intermittent once  sodium chloride 0.9%. 1000 milliLiter(s) IV Continuous <Continuous>      RADIOLOGY & ADDITIONAL TESTS:  < from: CT Head No Cont (05.04.24 @ 06:02) >  IMPRESSION:  Stable postoperative changes following right parietal craniotomy and   brain tumor resection.      ASSESSMENT:  68F PMHx of Afib on ASA, HTN, HLD presented to [facility] for brain mass resection. Patient initially presented 4/19/24 c/o 2 weeks of difficulty with fine motor skills (buttoning shirt, combing hair, holding utensils) found to have a right frontoparietal mass on CTH. Metastasis workup negative. 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 S/p R craniotomy for tumor resection with 5-ALA 5/3.    PLAN:   NEURO:  - neuro checks q2hrs   - Keppra 500 Q12  - Decadron 4q6  - subgaleal GLENN to full suction, monitor outputs   - pain control PRN: tylenol, dilaudid 0.5q6, tramadol 25/50  - pend postop MRI brain w+w/o contrast     CV:   - SBP goal <140   - diltiazem 180  - lipitor 20, LDL 88   - PRN: hydralazine, labetalol    Resp:   - tolerating RA  - incentive spirometry    GI:   - diet: DASH regular   - LBM: outpatient  - bowel regimen: Senna, miralax   - protonix while on steroids    - PRN: Zofran     :   - voiding, IVL    Heme:   - DVT ppx: SCDs    ID:   - Ancef while drain is in    Endo:  - ISS while on steroids      DISPO:   - downgraded to neurosurgery stepdown   - OT recc acute rehab     D/w Dr. Munguia, Dr. Mansfield and neurosurgery ACPs

## 2024-05-06 LAB
ANION GAP SERPL CALC-SCNC: 8 MMOL/L — SIGNIFICANT CHANGE UP (ref 5–17)
BUN SERPL-MCNC: 25.3 MG/DL — HIGH (ref 8–20)
CALCIUM SERPL-MCNC: 8.3 MG/DL — LOW (ref 8.4–10.5)
CHLORIDE SERPL-SCNC: 106 MMOL/L — SIGNIFICANT CHANGE UP (ref 96–108)
CO2 SERPL-SCNC: 26 MMOL/L — SIGNIFICANT CHANGE UP (ref 22–29)
CREAT SERPL-MCNC: 0.58 MG/DL — SIGNIFICANT CHANGE UP (ref 0.5–1.3)
EGFR: 98 ML/MIN/1.73M2 — SIGNIFICANT CHANGE UP
GLUCOSE BLDC GLUCOMTR-MCNC: 126 MG/DL — HIGH (ref 70–99)
GLUCOSE BLDC GLUCOMTR-MCNC: 177 MG/DL — HIGH (ref 70–99)
GLUCOSE BLDC GLUCOMTR-MCNC: 208 MG/DL — HIGH (ref 70–99)
GLUCOSE SERPL-MCNC: 127 MG/DL — HIGH (ref 70–99)
HCT VFR BLD CALC: 25.7 % — LOW (ref 34.5–45)
HGB BLD-MCNC: 8.8 G/DL — LOW (ref 11.5–15.5)
MAGNESIUM SERPL-MCNC: 2.1 MG/DL — SIGNIFICANT CHANGE UP (ref 1.6–2.6)
MCHC RBC-ENTMCNC: 31 PG — SIGNIFICANT CHANGE UP (ref 27–34)
MCHC RBC-ENTMCNC: 34.2 GM/DL — SIGNIFICANT CHANGE UP (ref 32–36)
MCV RBC AUTO: 90.5 FL — SIGNIFICANT CHANGE UP (ref 80–100)
PHOSPHATE SERPL-MCNC: 2.4 MG/DL — SIGNIFICANT CHANGE UP (ref 2.4–4.7)
PLATELET # BLD AUTO: 124 K/UL — LOW (ref 150–400)
POTASSIUM SERPL-MCNC: 4.3 MMOL/L — SIGNIFICANT CHANGE UP (ref 3.5–5.3)
POTASSIUM SERPL-SCNC: 4.3 MMOL/L — SIGNIFICANT CHANGE UP (ref 3.5–5.3)
RBC # BLD: 2.84 M/UL — LOW (ref 3.8–5.2)
RBC # FLD: 12.8 % — SIGNIFICANT CHANGE UP (ref 10.3–14.5)
SODIUM SERPL-SCNC: 140 MMOL/L — SIGNIFICANT CHANGE UP (ref 135–145)
WBC # BLD: 15.41 K/UL — HIGH (ref 3.8–10.5)
WBC # FLD AUTO: 15.41 K/UL — HIGH (ref 3.8–10.5)

## 2024-05-06 PROCEDURE — 99233 SBSQ HOSP IP/OBS HIGH 50: CPT | Mod: GC

## 2024-05-06 RX ADMIN — Medication 4 MILLIGRAM(S): at 22:44

## 2024-05-06 RX ADMIN — PANTOPRAZOLE SODIUM 40 MILLIGRAM(S): 20 TABLET, DELAYED RELEASE ORAL at 06:18

## 2024-05-06 RX ADMIN — Medication 2: at 12:52

## 2024-05-06 RX ADMIN — Medication 4 MILLIGRAM(S): at 12:52

## 2024-05-06 RX ADMIN — Medication 4 MILLIGRAM(S): at 06:19

## 2024-05-06 RX ADMIN — Medication 4 MILLIGRAM(S): at 17:16

## 2024-05-06 RX ADMIN — Medication 2000 MILLIGRAM(S): at 06:18

## 2024-05-06 RX ADMIN — Medication 2000 MILLIGRAM(S): at 12:54

## 2024-05-06 RX ADMIN — CHLORHEXIDINE GLUCONATE 1 APPLICATION(S): 213 SOLUTION TOPICAL at 12:54

## 2024-05-06 RX ADMIN — POLYETHYLENE GLYCOL 3350 17 GRAM(S): 17 POWDER, FOR SOLUTION ORAL at 12:52

## 2024-05-06 RX ADMIN — ATORVASTATIN CALCIUM 20 MILLIGRAM(S): 80 TABLET, FILM COATED ORAL at 22:44

## 2024-05-06 RX ADMIN — Medication 180 MILLIGRAM(S): at 06:18

## 2024-05-06 RX ADMIN — SENNA PLUS 2 TABLET(S): 8.6 TABLET ORAL at 22:44

## 2024-05-06 RX ADMIN — Medication 4: at 17:22

## 2024-05-06 RX ADMIN — Medication 4 MILLIGRAM(S): at 00:38

## 2024-05-06 RX ADMIN — LEVETIRACETAM 500 MILLIGRAM(S): 250 TABLET, FILM COATED ORAL at 17:16

## 2024-05-06 RX ADMIN — Medication 2000 MILLIGRAM(S): at 22:43

## 2024-05-06 RX ADMIN — Medication 2000 MILLIGRAM(S): at 12:51

## 2024-05-06 RX ADMIN — LEVETIRACETAM 500 MILLIGRAM(S): 250 TABLET, FILM COATED ORAL at 06:18

## 2024-05-06 NOTE — CHART NOTE - NSCHARTNOTEFT_GEN_A_CORE
Subgaleal GLENN drain removed today. Drain site cleaned, drain removed off suction, mercy stitch secured, bleeding controlled, and dressing applied. Pt tolerated procedure well with no complaints.

## 2024-05-06 NOTE — PHYSICAL THERAPY INITIAL EVALUATION ADULT - REHAB POTENTIAL, PT EVAL
Physician Progress Note      PATIENT:               Alanna Vora  CSN #:                  [id number]  :                       1934  ADMIT DATE:       2021 11:55 PM  Ronni Prado DATE:        2021 2:52 PM  RESPONDING  PROVIDER #:        Katie Cantu NP          QUERY TEXT:    Pt admitted with SOB,  Hyponatremia & Pneumonia. Pt noted to have HYPOXIA. If possible, please document in the progress notes and discharge summary if you are evaluating and/or treating any of the following: The medical record reflects the following:  Risk Factors: PNEUMONIA, HYPONATERMIA, SOB, HYPOMAGNESEMIA, HYPOKALEMIA, AFIB  Clinical Indicators: TRIAGE: \"EMS states SpO2 88% on rm air upon their arrival.\" ED DR: \"Patient reports that she feels short of breath even if she is not moving. Positive for fatigue. Positive for cough and shortness of breath. Tachypnea present. Rhonchi present. \" RR: 22-24, O2 SATS RA: 88%. Treatment: OXYGEN THERAPY, CXR, PULSE OX MONITORING, IV ABX, LAB MONITORING. Thank you,  JANESSA EverettN, RN, [facility], [facility] Specialist  [phone] or [email or web address]  Options provided:  -- Acute respiratory failure with hypoxia  -- Acute respiratory failure with hypercapnia  -- Respiratory failure ruled out  -- Other - I will add my own diagnosis  -- Disagree - Not applicable / Not valid  -- Disagree - Clinically unable to determine / Unknown  -- Refer to Clinical Documentation Reviewer    PROVIDER RESPONSE TEXT:    This patient is in acute respiratory failure with hypoxia.     Query created by: Nelly Acuña on 2021 11:53 AM      Electronically signed by:  Katie Cantu NP 2021 2:26 PM good, to achieve stated therapy goals

## 2024-05-06 NOTE — PROGRESS NOTE ADULT - SUBJECTIVE AND OBJECTIVE BOX
S/P Right crani for parietal tumor resection with 5ALA   Post op course uncomplicated   Regular diet   VSS, Afebrile     FUNCTIONAL PROGRESS  OT 5/5   Bed Mobility  Bed Mobility Training Sit-to-Supine: minimum assist (75% patient effort)  Bed Mobility Training Supine-to-Sit: minimum assist (75% patient effort)    Bed-Chair Transfer Training  Transfer Training Bed-to-Chair Transfer: minimum assist (75% patient effort);  1 person assist;  rolling walker  Transfer Training Chair-to-Bed Transfer: minimum assist (75% patient effort);  1 person assist;  rolling walker    Sit-Stand Transfer Training  Transfer Training Sit-to-Stand Transfer: minimum assist (75% patient effort);  1 person assist;  rolling walker  Transfer Training Stand-to-Sit Transfer: minimum assist (75% patient effort);  1 person assist;  rolling walker    Therapeutic Exercise  Therapeutic Exercise Detail: left sided unawareness during confrontation testing. decreased tracking noted towards left side at all planes.     Lower Body Dressing Training  Lower Body Dressing Training Assistance: maximum assist (25% patient effort)      VITALS  T(C): 36.7 (05-06-24 @ 07:16), Max: 37.1 (05-05-24 @ 19:17)  HR: 69 (05-06-24 @ 07:32) (65 - 92)  BP: 117/56 (05-06-24 @ 07:32) (109/55 - 138/59)  RR: 17 (05-06-24 @ 07:32) (12 - 21)  SpO2: 100% (05-06-24 @ 07:32) (96% - 100%)  Wt(kg): --    MEDICATIONS   acetaminophen     Tablet .. 650 milliGRAM(s) every 6 hours PRN  acetaminophen   IVPB .. 1000 milliGRAM(s) every 6 hours PRN  atorvastatin 20 milliGRAM(s) at bedtime  ceFAZolin  Injectable. 2000 milliGRAM(s) once  ceFAZolin  Injectable. 2000 milliGRAM(s) every 8 hours  chlorhexidine 2% Cloths 1 Application(s) daily  dexAMETHasone  Injectable 4 milliGRAM(s) every 6 hours  dextrose 50% Injectable 25 Gram(s) once  dextrose 50% Injectable 12.5 Gram(s) once  diltiazem    milliGRAM(s) daily  hydrALAZINE Injectable 10 milliGRAM(s) every 2 hours PRN  HYDROmorphone  Injectable 0.5 milliGRAM(s) every 6 hours PRN  influenza  Vaccine (HIGH DOSE) 0.7 milliLiter(s) once  insulin lispro (ADMELOG) corrective regimen sliding scale   three times a day before meals  labetalol Injectable 10 milliGRAM(s) every 2 hours PRN  levETIRAcetam 500 milliGRAM(s) every 12 hours  ondansetron Injectable 4 milliGRAM(s) every 6 hours PRN  pantoprazole    Tablet 40 milliGRAM(s) before breakfast  polyethylene glycol 3350 17 Gram(s) daily  senna 2 Tablet(s) at bedtime  traMADol 25 milliGRAM(s) every 4 hours PRN  traMADol 50 milliGRAM(s) every 4 hours PRN      RECENT LABS/IMAGING  - Reviewed Today                        8.8    15.41 )-----------( 124      ( 06 May 2024 06:40 )             25.7     05-06    140  |  106  |  25.3<H>  ----------------------------<  127<H>  4.3   |  26.0  |  0.58    Ca    8.3<L>      06 May 2024 06:40  Phos  2.4     05-06  Mg     2.1     05-06        Urinalysis Basic - ( 06 May 2024 06:40 )    Color: x / Appearance: x / SG: x / pH: x  Gluc: 127 mg/dL / Ketone: x  / Bili: x / Urobili: x   Blood: x / Protein: x / Nitrite: x   Leuk Esterase: x / RBC: x / WBC x   Sq Epi: x / Non Sq Epi: x / Bacteria: x    5/2 MRI Brain Stereotactic   IMPRESSION: There is a 3.9 x 3.9 x 3.3 cm heterogeneously enhancing,   centrally necrotic mass in the right parietal lobe suspicious for   malignancy. The lesion demonstrates foci of susceptibility artifact   suggesting hemorrhage. There is extensive surrounding T2/FLAIR   hyperintensity suggesting vasogenic edema and/or nonenhancing tumor.   Right-to-left midline shift measuring 3 mm.    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     ---------------------------------------------------------------------------------------  PHYSICAL EXAM  Constitutional - NAD, Comfortable  HEENT - Kerlex around head   Neck - Supple, No limited ROM  Chest - Breathing comfortably on RA   Cardiovascular - RRR   Abdomen - Soft   Extremities - No peripheral edema   Neurologic Exam -                    Cognitive - AAO to self, place, date, year, situation     Communication - Fluent, No dysarthria     Cranial Nerves - CN 2-12 intact     FUNCTIONAL MOTOR EXAM - No focal deficits - Has some sense of fine motor impairments when tested                    LEFT    UE - ShAB 5/5, EF 5/5, EE 5/5, WE 5/5,  5/5                    RIGHT UE - ShAB 5/5, EF 5/5, EE 5/5, WE 5/5,  5/5                    LEFT    LE - HF 5/5, KE 5/5, DF 5/5, PF 5/5                    RIGHT LE - HF 5/5, KE 5/5, DF 5/5, PF 5/5        Sensory - Intact to LT   Psychiatric - Mood stable, Affect WNL  ----------------------------------------------------------------------------------------  ASSESSMENT/PLAN  68yFemale with functional deficits related to right brain mass   Right Frontal Mass pending resection - Keppra, Decadron  CAD - Lipitor, Tricor  HTn - Cardizem  Pain - Tylenol  Diet - NPO   DVT PPX - SCDs, Lovenox  Rehab/Impaired mobility and function - Continuous hospitalization is crucial for managing the patient's acute medical issues (brain mass, coordination issues) which have significantly impacted their mobility, quality of life, and function. Rehabilitation recommendations will be based on the patient's functional progress and their ability to participate in and tolerate therapeutic interventions, which may change over time. Maintaining ongoing mobilization by the staff is imperative to prevent secondary medical complications and associated health issues related to debility.    PENDING:  OR for biopsy of brain mass 5/3     RECOMMENDATIONS:   -Bedside exercises     DISPOSITION:   To be determined, depend on medical stability and functional progress/deficits. PM&R will continue to follow.          LUE coordination issues and decreased strength LUE   Answering all questions appropriately   No HA/SOB/NV  Eating and sleeping well     S/P Right crani for parietal tumor resection with 5ALA   Post op course uncomplicated   Regular diet   VSS, Afebrile     FUNCTIONAL PROGRESS  OT 5/5   Bed Mobility  Bed Mobility Training Sit-to-Supine: minimum assist (75% patient effort)  Bed Mobility Training Supine-to-Sit: minimum assist (75% patient effort)    Bed-Chair Transfer Training  Transfer Training Bed-to-Chair Transfer: minimum assist (75% patient effort);  1 person assist;  rolling walker  Transfer Training Chair-to-Bed Transfer: minimum assist (75% patient effort);  1 person assist;  rolling walker    Sit-Stand Transfer Training  Transfer Training Sit-to-Stand Transfer: minimum assist (75% patient effort);  1 person assist;  rolling walker  Transfer Training Stand-to-Sit Transfer: minimum assist (75% patient effort);  1 person assist;  rolling walker    Therapeutic Exercise  Therapeutic Exercise Detail: left sided unawareness during confrontation testing. decreased tracking noted towards left side at all planes.     Lower Body Dressing Training  Lower Body Dressing Training Assistance: maximum assist (25% patient effort)      VITALS  T(C): 36.7 (05-06-24 @ 07:16), Max: 37.1 (05-05-24 @ 19:17)  HR: 69 (05-06-24 @ 07:32) (65 - 92)  BP: 117/56 (05-06-24 @ 07:32) (109/55 - 138/59)  RR: 17 (05-06-24 @ 07:32) (12 - 21)  SpO2: 100% (05-06-24 @ 07:32) (96% - 100%)  Wt(kg): --    MEDICATIONS   acetaminophen     Tablet .. 650 milliGRAM(s) every 6 hours PRN  acetaminophen   IVPB .. 1000 milliGRAM(s) every 6 hours PRN  atorvastatin 20 milliGRAM(s) at bedtime  ceFAZolin  Injectable. 2000 milliGRAM(s) once  ceFAZolin  Injectable. 2000 milliGRAM(s) every 8 hours  chlorhexidine 2% Cloths 1 Application(s) daily  dexAMETHasone  Injectable 4 milliGRAM(s) every 6 hours  dextrose 50% Injectable 25 Gram(s) once  dextrose 50% Injectable 12.5 Gram(s) once  diltiazem    milliGRAM(s) daily  hydrALAZINE Injectable 10 milliGRAM(s) every 2 hours PRN  HYDROmorphone  Injectable 0.5 milliGRAM(s) every 6 hours PRN  influenza  Vaccine (HIGH DOSE) 0.7 milliLiter(s) once  insulin lispro (ADMELOG) corrective regimen sliding scale   three times a day before meals  labetalol Injectable 10 milliGRAM(s) every 2 hours PRN  levETIRAcetam 500 milliGRAM(s) every 12 hours  ondansetron Injectable 4 milliGRAM(s) every 6 hours PRN  pantoprazole    Tablet 40 milliGRAM(s) before breakfast  polyethylene glycol 3350 17 Gram(s) daily  senna 2 Tablet(s) at bedtime  traMADol 25 milliGRAM(s) every 4 hours PRN  traMADol 50 milliGRAM(s) every 4 hours PRN      RECENT LABS/IMAGING  - Reviewed Today                        8.8    15.41 )-----------( 124      ( 06 May 2024 06:40 )             25.7     05-06    140  |  106  |  25.3<H>  ----------------------------<  127<H>  4.3   |  26.0  |  0.58    Ca    8.3<L>      06 May 2024 06:40  Phos  2.4     05-06  Mg     2.1     05-06        Urinalysis Basic - ( 06 May 2024 06:40 )    Color: x / Appearance: x / SG: x / pH: x  Gluc: 127 mg/dL / Ketone: x  / Bili: x / Urobili: x   Blood: x / Protein: x / Nitrite: x   Leuk Esterase: x / RBC: x / WBC x   Sq Epi: x / Non Sq Epi: x / Bacteria: x    5/2 MRI Brain Stereotactic   IMPRESSION: There is a 3.9 x 3.9 x 3.3 cm heterogeneously enhancing,   centrally necrotic mass in the right parietal lobe suspicious for   malignancy. The lesion demonstrates foci of susceptibility artifact   suggesting hemorrhage. There is extensive surrounding T2/FLAIR   hyperintensity suggesting vasogenic edema and/or nonenhancing tumor.   Right-to-left midline shift measuring 3 mm.    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     ---------------------------------------------------------------------------------------  PHYSICAL EXAM  Constitutional - NAD, Comfortable  HEENT - R Crani   Chest - Breathing comfortably on RA   Cardiovascular - RRR   Abdomen - Soft   Extremities - No peripheral edema   Neurologic Exam -                    Cognitive - AAO to self, place, date, year, situation     Communication - Fluent, No dysarthria     Cranial Nerves - CN 2-12 intact     FUNCTIONAL MOTOR EXAM - No focal deficits - Has some sense of fine motor impairments when tested                    LEFT    UE - ShAB 4/5, EF 4/5, EE 4/5, WE 4/5,  4/5                    RIGHT UE - ShAB 5/5, EF 5/5, EE 5/5, WE 5/5,  5/5                    LEFT    LE - HF 5/5, KE 5/5, DF 5/5, PF 5/5                    RIGHT LE - HF 5/5, KE 5/5, DF 5/5, PF 5/5        Sensory - Intact to LT   Psychiatric - Mood stable, Affect WNL  ----------------------------------------------------------------------------------------  ASSESSMENT/PLAN  68yFemale with functional deficits related to right brain mass   Right Frontal Mass pending resection - Keppra, Decadron  CAD - Lipitor   HTN - Labetalol, dilt   Pain - Tylenol, tramadol, dilaudid   Diet - NPO   DVT PPX - SCDs, Lovenox  Rehab/Impaired mobility and function - Continuous hospitalization is crucial for managing the patient's acute medical issues (brain mass, coordination issues) which have significantly impacted their mobility, quality of life, and function. Rehabilitation recommendations will be based on the patient's functional progress and their ability to participate in and tolerate therapeutic interventions, which may change over time. Maintaining ongoing mobilization by the staff is imperative to prevent secondary medical complications and associated health issues related to debility.    PENDING:  Medical stability - GLENN drain     RECOMMENDATIONS:   Bedside exercises     DISPOSITION:   Patient would likely benefit from ACUTE inpatient rehabilitation where she would receive 3h of PT/OT/SLP 5days per week and be followed by a PM&R physician, but if her functional status improves and she is medically stable then discharge HOME might be an option. Will continue to follow.

## 2024-05-06 NOTE — PHYSICAL THERAPY INITIAL EVALUATION ADULT - GENERAL OBSERVATIONS, REHAB EVAL
Pt rec'd upright in the bed breathing RA in NAD
Pt received in bed, + IV Loc, +Tele/, breathing on RA in NAD, in 0/10 pain, agreeable to PT evaluation

## 2024-05-06 NOTE — PHYSICAL THERAPY INITIAL EVALUATION ADULT - PLANNED THERAPY INTERVENTIONS, PT EVAL
Pt is functionally at baseline level of function and not in need of skilled PT, will no longer follow, please re-order post-op
balance training/bed mobility training/gait training/strengthening/stretching/transfer training

## 2024-05-06 NOTE — PROGRESS NOTE ADULT - NS ATTEND AMEND GEN_ALL_CORE FT
JOE Attg:    see above    patient seen and examined    agree with above    MRI pending   d/c GLENN
NSGY Attg:    see above    patient seen and examined    agree with above  A and O x 3  LE to command  5/5 bilaterally    patient is declining re-explanation of risks, benefits, and alternatives of surgery as previously discussed and documented    all additional questions answered    plan of care determined for right brain tumor  - to OR today    this is a complex case; imaging reviewed and plan of care discussed with multiple qualified providers
NSGY Attg:    see above    patient seen and examined     agree with above    patient verbalizes understanding of risks, benefits, and alternatives of surgical intervention as previously discussed as below:    benefit: hopeful decompression, hopeful tissue diagnosis, hopeful improvement in symptoms, hopeful prevention of progression of deficit  alternative: no surgical intervention; continued surveillance  risks: bleeding, infection, CSF leak, failure of procedure, need for re-operation, seizure, stroke, coma, death, DVT, PE, MI, PNA, UTI, difficulty/failure to intubate or extubate, new or worsening numbness, tingling, weakness, paralysis, sensory changes, difficulty/inability to ambulate, difficulty with expressive or receptive speech, altered personality or judgment, sexual dysfunction, incontinence    I explained the risks of 5-ALA administration: photosensitivity reaction, damage to skin/eyes, liver damage    The patient verbalizes her understanding of the above.  All questions answered.    plan of care determined for right brain tumor  angio/possible embo today  OR tomorrow
NSGY Attg:    see above    patient seen and examined    agree with above    plan of care determined for right brain tumor  medical optimization pending  repeat ASA response test tomorrow am  possible OR Fri, 5/3 if medically optimized and ASA response test greater than 550

## 2024-05-06 NOTE — PHYSICAL THERAPY INITIAL EVALUATION ADULT - PERTINENT HX OF CURRENT PROBLEM, REHAB EVAL
68 year-old female with PMHx Afib on ASA, HTN, HLD presents to [facility] for brain mass resection. Patient initially presented on 4/19/24 for 2 weeks of difficulty with fine motor skills
68 year-old female with PMHx Afib on ASA, HTN, HLD presents to [facility] for brain mass resection. Patient initially presented on 4/19/24 for 2 weeks of difficulty with fine motor skills (buttoning shirt, combing hair, holding utensils). CT Head at that time revealed a right frontoparietal mass for which neurosurgery was consulted. Pt was admitted to medicine for mets workup, CT CAP was negative. 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 Has been taking Keppra 500 BID for seizure ppx. Last dose of ASA was 4/28. Reports fine motor skills have been improving and otherwise feels well. Patient scheduled for R brain mass resection on 5/3/24 with Dr. Guaman.

## 2024-05-06 NOTE — PHYSICAL THERAPY INITIAL EVALUATION ADULT - ADDITIONAL COMMENTS
pt owns a cane but does not use an AD at baseline, states she has 2-3 steps to enter and that her spouse can assist as needed.
pt reports living in private home with  who is able to assist. 2 or 3 steps to enter depending on entrance with handrail and all needs met on the first floor. Independent prior to admission. Owns canes but does not use

## 2024-05-06 NOTE — PROGRESS NOTE ADULT - SUBJECTIVE AND OBJECTIVE BOX
HPI: 68 year-old female with PMHx Afib on ASA, HTN, HLD presents to [facility] for brain mass resection. Patient initially presented on 4/19/24 for 2 weeks of difficulty with fine motor skills (buttoning shirt, combing hair, holding utensils). CT Head at that time revealed a right frontoparietal mass for which neurosurgery was consulted. Pt was admitted to medicine for mets workup, CT CAP was negative. 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 Has been taking Keppra 500 BID for seizure ppx. Last dose of ASA was 4/28. Reports fine motor skills have been improving and otherwise feels well. Patient scheduled for R brain mass resection on 5/3/24 with Dr. Guaman.  (30 Apr 2024 12:29)      INTERVAL HPI/OVERNIGHT EVENTS:  68y Female POD#2 from right frontoparietal mass tumor resection, seen sitting in chair. Denies headache, weakness, numbness, n/v/d, fevers, chills, chest pain, SOB. Downgraded to stepdown yesterday. No acute issues overnight.      Vital Signs Last 24 Hrs  T(C): 36.7 (06 May 2024 07:16), Max: 37.1 (05 May 2024 19:17)  T(F): 98.1 (06 May 2024 07:16), Max: 98.8 (06 May 2024 04:02)  HR: 71 (06 May 2024 10:00) (65 - 92)  BP: 121/68 (06 May 2024 10:00) (109/62 - 138/59)  BP(mean): 83 (06 May 2024 10:00) (68 - 86)  RR: 13 (06 May 2024 10:00) (12 - 21)  SpO2: 99% (06 May 2024 10:00) (96% - 100%)    Parameters below as of 06 May 2024 10:00  Patient On (Oxygen Delivery Method): room air        PHYSICAL EXAM:    GENERAL: NAD, well-groomed  HEAD:  Atraumatic, normocephalic  DRAINS: subgaleal drain to full suction seen with serosanguinous fluid   WOUND: Dressing clean dry intact  MENTAL STATUS: AAO x3; Awake; Opens eyes spontaneously; Appropriately conversant without aphasia; following simple commands  CRANIAL NERVES: LIDIA; EOMI; no facial asymmetry; facial sensation grossly intact to light touch b/l  MOTOR: strength 5/5 B/L upper and lower extremities; sensation grossly intact all extremities  SKIN: Warm, dry    LABS:                        8.8    15.41 )-----------( 124      ( 06 May 2024 06:40 )             25.7     05-06    140  |  106  |  25.3<H>  ----------------------------<  127<H>  4.3   |  26.0  |  0.58    Ca    8.3<L>      06 May 2024 06:40  Phos  2.4     05-06  Mg     2.1     05-06        Urinalysis Basic - ( 06 May 2024 06:40 )    Color: x / Appearance: x / SG: x / pH: x  Gluc: 127 mg/dL / Ketone: x  / Bili: x / Urobili: x   Blood: x / Protein: x / Nitrite: x   Leuk Esterase: x / RBC: x / WBC x   Sq Epi: x / Non Sq Epi: x / Bacteria: x        05-05 @ 07:01  -  05-06 @ 07:00  --------------------------------------------------------  IN: 970 mL / OUT: 1100 mL / NET: -130 mL        RADIOLOGY & ADDITIONAL TESTS:    4/19 CTH: Right frontoparietal vasogenic edema with underlying 3.6cm x 4.6cm enhancing mass    4/19 CTA H/N: Negative    MRB stereotactic +/- : 3.7 cm x 4.2 cm x 4.2 cm necrotic enhancing heterogeneous mass in the posterior RIGHT frontal lobe w/ 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 Mild effacement of the RIGHT lateral ventricle with 4.6 mm subfalcine herniation to the LEFT.    CT CAP: Negative for METs    5/2 MRB stereotactic w/wo: 3.9 x 3.9 x 3.3 cm heterogeneously enhancing, centrally necrotic mass in R parietal lobe suspicious for malignancy. Demonstrates foci of susceptibility artifact suggesting hemorrhage. Right-to-left midline shift measuring 3 mm.    5/2 LED: negative    5/3 CTH: Expected changes   HPI: 68 year-old female with PMHx Afib on ASA, HTN, HLD presents to [facility] for brain mass resection. Patient initially presented on 4/19/24 for 2 weeks of difficulty with fine motor skills (buttoning shirt, combing hair, holding utensils). CT Head at that time revealed a right frontoparietal mass for which neurosurgery was consulted. Pt was admitted to medicine for mets workup, CT CAP was negative. 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 Has been taking Keppra 500 BID for seizure ppx. Last dose of ASA was 4/28. Reports fine motor skills have been improving and otherwise feels well. Patient scheduled for R brain mass resection on 5/3/24 with Dr. Guaman.  (30 Apr 2024 12:29)      INTERVAL HPI/OVERNIGHT EVENTS:  68y Female POD#3 from right frontoparietal mass tumor resection, seen sitting in chair. Denies headache, weakness, numbness, n/v/d, fevers, chills, chest pain, SOB. Downgraded to stepdown yesterday. No acute issues overnight.      Vital Signs Last 24 Hrs  T(C): 36.7 (06 May 2024 07:16), Max: 37.1 (05 May 2024 19:17)  T(F): 98.1 (06 May 2024 07:16), Max: 98.8 (06 May 2024 04:02)  HR: 71 (06 May 2024 10:00) (65 - 92)  BP: 121/68 (06 May 2024 10:00) (109/62 - 138/59)  BP(mean): 83 (06 May 2024 10:00) (68 - 86)  RR: 13 (06 May 2024 10:00) (12 - 21)  SpO2: 99% (06 May 2024 10:00) (96% - 100%)    Parameters below as of 06 May 2024 10:00  Patient On (Oxygen Delivery Method): room air        PHYSICAL EXAM:    GENERAL: NAD, well-groomed  HEAD:  Atraumatic, normocephalic  DRAINS: subgaleal drain to full suction seen with serosanguinous fluid   WOUND: Dressing clean dry intact  MENTAL STATUS: AAO x3; Awake; Opens eyes spontaneously; Appropriately conversant without aphasia; following simple commands  CRANIAL NERVES: LIDIA; EOMI; no facial asymmetry; facial sensation grossly intact to light touch b/l  MOTOR: strength 5/5 B/L upper and lower extremities; sensation grossly intact all extremities  SKIN: Warm, dry    LABS:                        8.8    15.41 )-----------( 124      ( 06 May 2024 06:40 )             25.7     05-06    140  |  106  |  25.3<H>  ----------------------------<  127<H>  4.3   |  26.0  |  0.58    Ca    8.3<L>      06 May 2024 06:40  Phos  2.4     05-06  Mg     2.1     05-06        Urinalysis Basic - ( 06 May 2024 06:40 )    Color: x / Appearance: x / SG: x / pH: x  Gluc: 127 mg/dL / Ketone: x  / Bili: x / Urobili: x   Blood: x / Protein: x / Nitrite: x   Leuk Esterase: x / RBC: x / WBC x   Sq Epi: x / Non Sq Epi: x / Bacteria: x        05-05 @ 07:01  -  05-06 @ 07:00  --------------------------------------------------------  IN: 970 mL / OUT: 1100 mL / NET: -130 mL        RADIOLOGY & ADDITIONAL TESTS:    4/19 CTH: Right frontoparietal vasogenic edema with underlying 3.6cm x 4.6cm enhancing mass    4/19 CTA H/N: Negative    MRB stereotactic +/- : 3.7 cm x 4.2 cm x 4.2 cm necrotic enhancing heterogeneous mass in the posterior RIGHT frontal lobe w/ 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 Mild effacement of the RIGHT lateral ventricle with 4.6 mm subfalcine herniation to the LEFT.    CT CAP: Negative for METs    5/2 MRB stereotactic w/wo: 3.9 x 3.9 x 3.3 cm heterogeneously enhancing, centrally necrotic mass in R parietal lobe suspicious for malignancy. Demonstrates foci of susceptibility artifact suggesting hemorrhage. Right-to-left midline shift measuring 3 mm.    5/2 LED: negative    5/3 CTH: Expected changes

## 2024-05-06 NOTE — PROGRESS NOTE ADULT - ATTENDING COMMENTS
Patient seen and examined. Case discussed with Dr. Thompson. Agree with recommendations.     Rehab/Impaired mobility and function - Patient continues to require hospitalization for the above diagnoses and ongoing active management of comorbid complications (GLENN drain) that are substantially impairing functional ability and impairing quality of life.     RECOMMEND - OOB daily, Turn Q2 when needed, HOB >30 degrees    When medically optimized, based on the patient's diagnosis, current functional status and potential for progress, recommend ACUTE inpatient rehabilitation for the functional deficits consisting of 3 hours of therapy/day & 24 hour RN/daily PMR physician for comorbid medical management. Patient will be able to tolerate 3 hours a day.     Of note, patient would like to consider DC home, pending progress.      Will continue to follow. Rehab recommendations are dependent on how functional progress changes as well as how patient continues to participate and tolerate therapeutic interventions, which may change. Recommend ongoing mobilization by staff to maintain cardiopulmonary function and prevention of secondary complications related to debility. Discussed the specific management and recommendations above with rehab clinical care team/rehab liaison.

## 2024-05-06 NOTE — CHART NOTE - NSCHARTNOTESELECT_GEN_ALL_CORE
Event Note
Nutrition Services
Cardiology/Off Service Note
Event Note
Neurointerventional Surgery Post Procedure Note/Event Note
Neurointerventional Surgery Pre-Procedure Note/Event Note
Transfer Note

## 2024-05-06 NOTE — PROGRESS NOTE ADULT - ASSESSMENT
68 year-old female presents to [facility] for brain mass resection. 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 Patients/p  R brain mass resection on 5/3/24 with Dr. Guaman.    Plan:    - Q2 neuro checks  - Pain control as needed, avoid over sedation   - Pending post op MRI Brain  - Continue AED: Keppra 500 BID  - Dexa 4q12, cont  - holding lovenox   - GLENN to full suction  - D/w Dr. Guaman   68 year-old female presents to [facility] for brain mass resection. 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 Patients/p  R brain mass resection on 5/3/24 with Dr. Guaman.    Plan:    - Q2 neuro checks  - Pain control as needed, avoid over sedation   - Pending post op MRI Brain  - Continue AED: Keppra 500 BID  - Dexa 4q12, cont  - holding lovenox   - Subgaleal GLENN removed today  - D/w Dr. Guaman

## 2024-05-07 LAB
ANION GAP SERPL CALC-SCNC: 9 MMOL/L — SIGNIFICANT CHANGE UP (ref 5–17)
BUN SERPL-MCNC: 33 MG/DL — HIGH (ref 8–20)
CALCIUM SERPL-MCNC: 8.6 MG/DL — SIGNIFICANT CHANGE UP (ref 8.4–10.5)
CHLORIDE SERPL-SCNC: 106 MMOL/L — SIGNIFICANT CHANGE UP (ref 96–108)
CO2 SERPL-SCNC: 26 MMOL/L — SIGNIFICANT CHANGE UP (ref 22–29)
CREAT SERPL-MCNC: 0.5 MG/DL — SIGNIFICANT CHANGE UP (ref 0.5–1.3)
EGFR: 101 ML/MIN/1.73M2 — SIGNIFICANT CHANGE UP
GLUCOSE BLDC GLUCOMTR-MCNC: 132 MG/DL — HIGH (ref 70–99)
GLUCOSE BLDC GLUCOMTR-MCNC: 142 MG/DL — HIGH (ref 70–99)
GLUCOSE BLDC GLUCOMTR-MCNC: 222 MG/DL — HIGH (ref 70–99)
GLUCOSE SERPL-MCNC: 163 MG/DL — HIGH (ref 70–99)
HCT VFR BLD CALC: 25.9 % — LOW (ref 34.5–45)
HGB BLD-MCNC: 8.7 G/DL — LOW (ref 11.5–15.5)
MAGNESIUM SERPL-MCNC: 2.1 MG/DL — SIGNIFICANT CHANGE UP (ref 1.8–2.6)
MCHC RBC-ENTMCNC: 30.5 PG — SIGNIFICANT CHANGE UP (ref 27–34)
MCHC RBC-ENTMCNC: 33.6 GM/DL — SIGNIFICANT CHANGE UP (ref 32–36)
MCV RBC AUTO: 90.9 FL — SIGNIFICANT CHANGE UP (ref 80–100)
PHOSPHATE SERPL-MCNC: 2.8 MG/DL — SIGNIFICANT CHANGE UP (ref 2.4–4.7)
PLATELET # BLD AUTO: 131 K/UL — LOW (ref 150–400)
POTASSIUM SERPL-MCNC: 4 MMOL/L — SIGNIFICANT CHANGE UP (ref 3.5–5.3)
POTASSIUM SERPL-SCNC: 4 MMOL/L — SIGNIFICANT CHANGE UP (ref 3.5–5.3)
RBC # BLD: 2.85 M/UL — LOW (ref 3.8–5.2)
RBC # FLD: 13 % — SIGNIFICANT CHANGE UP (ref 10.3–14.5)
SODIUM SERPL-SCNC: 141 MMOL/L — SIGNIFICANT CHANGE UP (ref 135–145)
WBC # BLD: 17.49 K/UL — HIGH (ref 3.8–10.5)
WBC # FLD AUTO: 17.49 K/UL — HIGH (ref 3.8–10.5)

## 2024-05-07 PROCEDURE — 99233 SBSQ HOSP IP/OBS HIGH 50: CPT

## 2024-05-07 PROCEDURE — 70553 MRI BRAIN STEM W/O & W/DYE: CPT | Mod: 26

## 2024-05-07 RX ORDER — DEXAMETHASONE 0.5 MG/5ML
4 ELIXIR ORAL EVERY 12 HOURS
Refills: 0 | Status: DISCONTINUED | OUTPATIENT
Start: 2024-05-07 | End: 2024-05-07

## 2024-05-07 RX ORDER — DEXAMETHASONE 0.5 MG/5ML
3 ELIXIR ORAL EVERY 6 HOURS
Refills: 0 | Status: DISCONTINUED | OUTPATIENT
Start: 2024-05-07 | End: 2024-05-08

## 2024-05-07 RX ORDER — ENOXAPARIN SODIUM 100 MG/ML
40 INJECTION SUBCUTANEOUS EVERY 24 HOURS
Refills: 0 | Status: DISCONTINUED | OUTPATIENT
Start: 2024-05-07 | End: 2024-05-08

## 2024-05-07 RX ADMIN — Medication 4: at 11:32

## 2024-05-07 RX ADMIN — LEVETIRACETAM 500 MILLIGRAM(S): 250 TABLET, FILM COATED ORAL at 17:18

## 2024-05-07 RX ADMIN — PANTOPRAZOLE SODIUM 40 MILLIGRAM(S): 20 TABLET, DELAYED RELEASE ORAL at 05:29

## 2024-05-07 RX ADMIN — Medication 4 MILLIGRAM(S): at 11:33

## 2024-05-07 RX ADMIN — ATORVASTATIN CALCIUM 20 MILLIGRAM(S): 80 TABLET, FILM COATED ORAL at 21:32

## 2024-05-07 RX ADMIN — LEVETIRACETAM 500 MILLIGRAM(S): 250 TABLET, FILM COATED ORAL at 05:29

## 2024-05-07 RX ADMIN — Medication 4 MILLIGRAM(S): at 05:29

## 2024-05-07 RX ADMIN — Medication 180 MILLIGRAM(S): at 05:29

## 2024-05-07 RX ADMIN — ENOXAPARIN SODIUM 40 MILLIGRAM(S): 100 INJECTION SUBCUTANEOUS at 21:24

## 2024-05-07 RX ADMIN — SENNA PLUS 2 TABLET(S): 8.6 TABLET ORAL at 21:24

## 2024-05-07 RX ADMIN — POLYETHYLENE GLYCOL 3350 17 GRAM(S): 17 POWDER, FOR SOLUTION ORAL at 11:33

## 2024-05-07 RX ADMIN — Medication 3 MILLIGRAM(S): at 17:18

## 2024-05-07 RX ADMIN — Medication 2000 MILLIGRAM(S): at 05:29

## 2024-05-07 NOTE — PROGRESS NOTE ADULT - SUBJECTIVE AND OBJECTIVE BOX
HPI: 68 year-old female with PMHx Afib on ASA, HTN, HLD presents to [facility] for brain mass resection. Patient initially presented on 4/19/24 for 2 weeks of difficulty with fine motor skills (buttoning shirt, combing hair, holding utensils). CT Head at that time revealed a right frontoparietal mass for which neurosurgery was consulted. Pt was admitted to medicine for mets workup, CT CAP was negative. 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 Has been taking Keppra 500 BID for seizure ppx. Last dose of ASA was 4/28. Reports fine motor skills have been improving and otherwise feels well. Patient scheduled for R brain mass resection on 5/3/24 with Dr. Guaman.  (30 Apr 2024 12:29)    INTERVAL HPI/OVERNIGHT EVENTS:  68y Female POD#4 from right frontoparietal mass tumor resection, seen sitting in bed.  Drain d/c'd yesterday. Downgraded to telemetry today. No acute issues overnight.    Vital Signs Last 24 Hrs  T(C): 37.1 (07 May 2024 08:00), Max: 37.1 (07 May 2024 08:00)  T(F): 98.8 (07 May 2024 08:00), Max: 98.8 (07 May 2024 08:00)  HR: 63 (07 May 2024 08:00) (56 - 93)  BP: 127/59 (07 May 2024 08:00) (109/52 - 136/62)  BP(mean): 81 (07 May 2024 08:00) (70 - 91)  RR: 14 (07 May 2024 08:00) (13 - 17)  SpO2: 99% (07 May 2024 08:00) (98% - 100%)    Parameters below as of 07 May 2024 08:00  Patient On (Oxygen Delivery Method): room air        PHYSICAL EXAM:  GENERAL: NAD, well-groomed  HEAD:  Atraumatic, normocephalic  WOUND: Dressing clean dry intact  MENTAL STATUS: AAO x3; Awake; Opens eyes spontaneously; Appropriately conversant without aphasia; following simple commands  CRANIAL NERVES: LIDIA; EOMI; no facial asymmetry; facial sensation grossly intact to light touch b/l  MOTOR: strength 5/5 B/L upper and lower extremities; sensation grossly intact all extremities  SKIN: Warm, dry    LABS:                        8.7    17.49 )-----------( 131      ( 07 May 2024 05:15 )             25.9     05-07    141  |  106  |  33.0<H>  ----------------------------<  163<H>  4.0   |  26.0  |  0.50    Ca    8.6      07 May 2024 05:15  Phos  2.8     05-07  Mg     2.1     05-07        Urinalysis Basic - ( 07 May 2024 05:15 )    Color: x / Appearance: x / SG: x / pH: x  Gluc: 163 mg/dL / Ketone: x  / Bili: x / Urobili: x   Blood: x / Protein: x / Nitrite: x   Leuk Esterase: x / RBC: x / WBC x   Sq Epi: x / Non Sq Epi: x / Bacteria: x        05-06 @ 07:01  -  05-07 @ 07:00  --------------------------------------------------------  IN: 340 mL / OUT: 550 mL / NET: -210 mL        RADIOLOGY & ADDITIONAL TESTS:    < from: MR Head w/wo IV Cont (05.07.24 @ 04:28) >  IMPRESSION:  Postsurgical changes involving the section of prior   neoplasm. Small adjacent 2 cm infarction in the RIGHT posterior frontal   lobe, medial to the surgical cavity, with restricted diffusion. Minimal   air and hemorrhagic products are seen with in the surgical cavity.    < from: CT Head No Cont (05.04.24 @ 06:02) >  IMPRESSION:  Stable postoperative changes following right parietal craniotomy and   brain tumor resection.

## 2024-05-07 NOTE — PROGRESS NOTE ADULT - ASSESSMENT
68 year-old female presents to [facility] for brain mass resection. 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 Patients/p  R brain mass resection on 5/3/24 with Dr. Guaman.    Plan:    - Q4 neuro checks  - Downgraded to telemetry today  - Pain control as needed, avoiding over sedation   - Post op MRI Brain reviewed: expected post op changes  - Continue AED: Keppra 500 BID  - Dexa 4q6, cont  - Holding lovenox   - Subgaleal GLENN removed yesterday  - D/w Dr. Guaman   68 year-old female presents to [facility] for brain mass resection. 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 Patients/p  R brain mass resection on 5/3/24 with Dr. Guaman.    Plan:    - Q4 neuro checks  - Downgraded to telemetry today  - Pain control as needed, avoiding over sedation   - Post op MRI Brain reviewed: expected post op changes  - Continue AED: Keppra 500 BID  - Dexa 4q6, cont  - Holding lovenox   - Subgaleal GLENN removed yesterday  - PT/OT recommend AR, but pt wants to go home, will ask PT/OT to re-eavluate  - D/w Dr. Guaman

## 2024-05-07 NOTE — PROGRESS NOTE ADULT - SUBJECTIVE AND OBJECTIVE BOX
Patient feel well.  Notes ongoing left sided difficulty.  Does not feel her vision is impaired, but noted spatial impairments on exam.     FUNCTIONAL PROGRESS  5/6 PT  Transfer: Sit to Stand:     · Level of [place]	minimum assist (75% patients effort)  · Physical Assist/Nonphysical Assist	nonverbal cues (demo/gestures)  · Assistive Device	PT assist    Transfer: Stand to Sit:     · Level of [place]	minimum assist (75% patients effort)  · Physical Assist/Nonphysical Assist	1 person assist  · Weight-Bearing Restrictions	PT assist    Sit/Stand Transfer Safety Analysis:     · Transfer Safety Concerns Noted	decreased weight-shifting ability  · Impairments Contributing to Impaired Transfers	impaired balance; decreased strength    Gait Skills:     · Level of [place]	minimum assist (75% patients effort)  · Physical Assist/Nonphysical Assist	1 person assist  · Assistive Device	PT assist  · Gait Distance	10 feet    5/5 OT  Bed Mobility  Bed Mobility Training Sit-to-Supine: minimum assist (75% patient effort)  Bed Mobility Training Supine-to-Sit: minimum assist (75% patient effort)    Bed-Chair Transfer Training  Transfer Training Bed-to-Chair Transfer: minimum assist (75% patient effort);  1 person assist;  rolling walker  Transfer Training Chair-to-Bed Transfer: minimum assist (75% patient effort);  1 person assist;  rolling walker    Sit-Stand Transfer Training  Transfer Training Sit-to-Stand Transfer: minimum assist (75% patient effort);  1 person assist;  rolling walker  Transfer Training Stand-to-Sit Transfer: minimum assist (75% patient effort);  1 person assist;  rolling walker    Therapeutic Exercise  Therapeutic Exercise Detail: left sided unawareness during confrontation testing. decreased tracking noted towards left side at all planes.     Lower Body Dressing Training  Lower Body Dressing Training Assistance: maximum assist (25% patient effort)      VITALS  T(C): 37.1 (05-07-24 @ 08:00), Max: 37.1 (05-07-24 @ 08:00)  HR: 63 (05-07-24 @ 08:00) (56 - 93)  BP: 127/59 (05-07-24 @ 08:00) (109/52 - 136/62)  RR: 14 (05-07-24 @ 08:00) (13 - 17)  SpO2: 99% (05-07-24 @ 08:00) (98% - 100%)  Wt(kg): --    MEDICATIONS   acetaminophen     Tablet .. 650 milliGRAM(s) every 6 hours PRN  acetaminophen   IVPB .. 1000 milliGRAM(s) every 6 hours PRN  atorvastatin 20 milliGRAM(s) at bedtime  ceFAZolin  Injectable. 2000 milliGRAM(s) every 8 hours  chlorhexidine 2% Cloths 1 Application(s) daily  dexAMETHasone  Injectable 4 milliGRAM(s) every 6 hours  dextrose 50% Injectable 25 Gram(s) once  dextrose 50% Injectable 12.5 Gram(s) once  diltiazem    milliGRAM(s) daily  hydrALAZINE Injectable 10 milliGRAM(s) every 2 hours PRN  HYDROmorphone  Injectable 0.5 milliGRAM(s) every 6 hours PRN  influenza  Vaccine (HIGH DOSE) 0.7 milliLiter(s) once  insulin lispro (ADMELOG) corrective regimen sliding scale   three times a day before meals  labetalol Injectable 10 milliGRAM(s) every 2 hours PRN  levETIRAcetam 500 milliGRAM(s) every 12 hours  ondansetron Injectable 4 milliGRAM(s) every 6 hours PRN  pantoprazole    Tablet 40 milliGRAM(s) before breakfast  polyethylene glycol 3350 17 Gram(s) daily  senna 2 Tablet(s) at bedtime  traMADol 25 milliGRAM(s) every 4 hours PRN  traMADol 50 milliGRAM(s) every 4 hours PRN      RECENT LABS/IMAGING  - Reviewed Today                        8.7    17.49 )-----------( 131      ( 07 May 2024 05:15 )             25.9     05-07    141  |  106  |  33.0<H>  ----------------------------<  163<H>  4.0   |  26.0  |  0.50    Ca    8.6      07 May 2024 05:15  Phos  2.8     05-07  Mg     2.1     05-07        Urinalysis Basic - ( 07 May 2024 05:15 )    Color: x / Appearance: x / SG: x / pH: x  Gluc: 163 mg/dL / Ketone: x  / Bili: x / Urobili: x   Blood: x / Protein: x / Nitrite: x   Leuk Esterase: x / RBC: x / WBC x   Sq Epi: x / Non Sq Epi: x / Bacteria: x        CT Head - right frontoparietal mass     MR Brain w/wo - 3.7 cm x 4.2 cm x 4.2 cm necrotic enhancing heterogeneous mass in the posterior right frontal lobe w/ 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     MRI HEAD 4/7 - Postsurgical changes involving the section of prior neoplasm. Small adjacent 2 cm infarction in the RIGHT posterior frontal lobe, medial to the surgical cavity, with restricted diffusion. Minimal air and hemorrhagic products are seen with in the surgical cavity.  ---------------------------------------------------------------------------------------  PHYSICAL EXAM  Constitutional - NAD, Comfortable  Extremities - No edema   Neurologic Exam -                    Cognitive - AAO to self, place, date, year, situation     Communication - Fluent, Mild dysarthria     Cranial Nerves - Left visuo-spatial deficits     FUNCTIONAL MOTOR EXAM - Left fine motor coordination deficits     Sensory - Intact to LT   Psychiatric - Mood stable, Affect WNL  ----------------------------------------------------------------------------------------  ASSESSMENT/PLAN  68yFemale with functional deficits after found to have a brain mass   Right Frontal Mass s/p Resection - Keppra, Decadron, Kefzol  CAD - Lipitor   HTN - Labetalol, Hydralazine  Pain - Tylenol, Tramadol, Dilaudid   Diet - NPO   DVT PPX - SCDs, Lovenox  Rehab/Impaired mobility and function - Patient continues to require hospitalization for the above diagnoses and ongoing active management of comorbid complications (IV Meds) that are substantially impairing functional ability and impairing quality of life.     RECOMMEND - OOB daily     When medically optimized, based on the patient's diagnosis, current functional status and potential for progress, recommend ACUTE inpatient rehabilitation for the functional deficits consisting of 3 hours of therapy/day & 24 hour RN/daily PMR physician for comorbid medical management. Patient will be able to tolerate 3 hours a day.     Of note, patient would like to consider DC home, pending progress.      Will continue to follow. Rehab recommendations are dependent on how functional progress changes as well as how patient continues to participate and tolerate therapeutic interventions, which may change. Recommend ongoing mobilization by staff to maintain cardiopulmonary function and prevention of secondary complications related to debility. Discussed the specific management and recommendations above with rehab clinical care team/rehab liaison.

## 2024-05-08 ENCOUNTER — TRANSCRIPTION ENCOUNTER (OUTPATIENT)
Age: 69
End: 2024-05-08

## 2024-05-08 VITALS
TEMPERATURE: 98 F | SYSTOLIC BLOOD PRESSURE: 137 MMHG | OXYGEN SATURATION: 96 % | DIASTOLIC BLOOD PRESSURE: 65 MMHG | RESPIRATION RATE: 16 BRPM | HEART RATE: 81 BPM

## 2024-05-08 LAB
GLUCOSE BLDC GLUCOMTR-MCNC: 127 MG/DL — HIGH (ref 70–99)
GLUCOSE BLDC GLUCOMTR-MCNC: 148 MG/DL — HIGH (ref 70–99)
GLUCOSE BLDC GLUCOMTR-MCNC: 204 MG/DL — HIGH (ref 70–99)

## 2024-05-08 PROCEDURE — C1889: CPT

## 2024-05-08 PROCEDURE — 88341 IMHCHEM/IMCYTCHM EA ADD ANTB: CPT

## 2024-05-08 PROCEDURE — 87641 MR-STAPH DNA AMP PROBE: CPT

## 2024-05-08 PROCEDURE — 84484 ASSAY OF TROPONIN QUANT: CPT

## 2024-05-08 PROCEDURE — 83735 ASSAY OF MAGNESIUM: CPT

## 2024-05-08 PROCEDURE — 82330 ASSAY OF CALCIUM: CPT

## 2024-05-08 PROCEDURE — 85014 HEMATOCRIT: CPT

## 2024-05-08 PROCEDURE — 85018 HEMOGLOBIN: CPT

## 2024-05-08 PROCEDURE — 36226 PLACE CATH VERTEBRAL ART: CPT

## 2024-05-08 PROCEDURE — 88342 IMHCHEM/IMCYTCHM 1ST ANTB: CPT

## 2024-05-08 PROCEDURE — 83605 ASSAY OF LACTIC ACID: CPT

## 2024-05-08 PROCEDURE — 97167 OT EVAL HIGH COMPLEX 60 MIN: CPT

## 2024-05-08 PROCEDURE — 84132 ASSAY OF SERUM POTASSIUM: CPT

## 2024-05-08 PROCEDURE — 97530 THERAPEUTIC ACTIVITIES: CPT

## 2024-05-08 PROCEDURE — 82962 GLUCOSE BLOOD TEST: CPT

## 2024-05-08 PROCEDURE — 93306 TTE W/DOPPLER COMPLETE: CPT

## 2024-05-08 PROCEDURE — 84295 ASSAY OF SERUM SODIUM: CPT

## 2024-05-08 PROCEDURE — 80048 BASIC METABOLIC PNL TOTAL CA: CPT

## 2024-05-08 PROCEDURE — 84100 ASSAY OF PHOSPHORUS: CPT

## 2024-05-08 PROCEDURE — 83036 HEMOGLOBIN GLYCOSYLATED A1C: CPT

## 2024-05-08 PROCEDURE — 88307 TISSUE EXAM BY PATHOLOGIST: CPT

## 2024-05-08 PROCEDURE — 86923 COMPATIBILITY TEST ELECTRIC: CPT

## 2024-05-08 PROCEDURE — 86850 RBC ANTIBODY SCREEN: CPT

## 2024-05-08 PROCEDURE — 99233 SBSQ HOSP IP/OBS HIGH 50: CPT

## 2024-05-08 PROCEDURE — A9579: CPT

## 2024-05-08 PROCEDURE — 88331 PATH CONSLTJ SURG 1 BLK 1SPC: CPT

## 2024-05-08 PROCEDURE — 85730 THROMBOPLASTIN TIME PARTIAL: CPT

## 2024-05-08 PROCEDURE — C1894: CPT

## 2024-05-08 PROCEDURE — C1887: CPT

## 2024-05-08 PROCEDURE — C1760: CPT

## 2024-05-08 PROCEDURE — 82435 ASSAY OF BLOOD CHLORIDE: CPT

## 2024-05-08 PROCEDURE — 80061 LIPID PANEL: CPT

## 2024-05-08 PROCEDURE — 36415 COLL VENOUS BLD VENIPUNCTURE: CPT

## 2024-05-08 PROCEDURE — 88360 TUMOR IMMUNOHISTOCHEM/MANUAL: CPT

## 2024-05-08 PROCEDURE — 86900 BLOOD TYPING SEROLOGIC ABO: CPT

## 2024-05-08 PROCEDURE — 93005 ELECTROCARDIOGRAM TRACING: CPT

## 2024-05-08 PROCEDURE — 93970 EXTREMITY STUDY: CPT

## 2024-05-08 PROCEDURE — 36227 PLACE CATH XTRNL CAROTID: CPT

## 2024-05-08 PROCEDURE — 85610 PROTHROMBIN TIME: CPT

## 2024-05-08 PROCEDURE — 86901 BLOOD TYPING SEROLOGIC RH(D): CPT

## 2024-05-08 PROCEDURE — 36224 PLACE CATH CAROTD ART: CPT

## 2024-05-08 PROCEDURE — 80053 COMPREHEN METABOLIC PANEL: CPT

## 2024-05-08 PROCEDURE — 70450 CT HEAD/BRAIN W/O DYE: CPT | Mod: MC

## 2024-05-08 PROCEDURE — 97535 SELF CARE MNGMENT TRAINING: CPT

## 2024-05-08 PROCEDURE — C1713: CPT

## 2024-05-08 PROCEDURE — 87640 STAPH A DNA AMP PROBE: CPT

## 2024-05-08 PROCEDURE — 85027 COMPLETE CBC AUTOMATED: CPT

## 2024-05-08 PROCEDURE — C9399: CPT

## 2024-05-08 PROCEDURE — C1769: CPT

## 2024-05-08 PROCEDURE — 70553 MRI BRAIN STEM W/O & W/DYE: CPT | Mod: MC

## 2024-05-08 PROCEDURE — 82803 BLOOD GASES ANY COMBINATION: CPT

## 2024-05-08 PROCEDURE — 82947 ASSAY GLUCOSE BLOOD QUANT: CPT

## 2024-05-08 RX ORDER — DEXAMETHASONE 0.5 MG/5ML
1 ELIXIR ORAL
Qty: 30 | Refills: 0
Start: 2024-05-08

## 2024-05-08 RX ORDER — PANTOPRAZOLE SODIUM 20 MG/1
1 TABLET, DELAYED RELEASE ORAL
Qty: 30 | Refills: 0
Start: 2024-05-08 | End: 2024-06-06

## 2024-05-08 RX ORDER — LEVETIRACETAM 250 MG/1
1 TABLET, FILM COATED ORAL
Qty: 60 | Refills: 0
Start: 2024-05-08 | End: 2024-06-06

## 2024-05-08 RX ADMIN — Medication 3 MILLIGRAM(S): at 17:23

## 2024-05-08 RX ADMIN — Medication 3 MILLIGRAM(S): at 00:39

## 2024-05-08 RX ADMIN — Medication 3 MILLIGRAM(S): at 05:05

## 2024-05-08 RX ADMIN — PANTOPRAZOLE SODIUM 40 MILLIGRAM(S): 20 TABLET, DELAYED RELEASE ORAL at 05:04

## 2024-05-08 RX ADMIN — LEVETIRACETAM 500 MILLIGRAM(S): 250 TABLET, FILM COATED ORAL at 05:05

## 2024-05-08 RX ADMIN — LEVETIRACETAM 500 MILLIGRAM(S): 250 TABLET, FILM COATED ORAL at 17:23

## 2024-05-08 RX ADMIN — Medication 4: at 12:30

## 2024-05-08 RX ADMIN — Medication 3 MILLIGRAM(S): at 12:31

## 2024-05-08 NOTE — PROGRESS NOTE ADULT - ASSESSMENT
This is a 68yf s/p cranio for brain tumor    Plan  -pt recomm for AR pt requesting to be discharged home  -will be discharge on decadron taper  -keppra 500 mg q 12  -protonix for GI protection\  -pt will f/u with Dr Guaman in 1 to 2 weeks for staple removal     On __5/8___, the patient was provided with a brief summary and overview of the hospital course including admission date, diagnosis, surgery / procedure, complications if applicable, discharge instructions including but not limited to: incision care, signs/symptoms to look out for after discharge, home medications, new medications, patient / family education/training and instructions for follow up. Any concerns were addressed, and all questions were answered.

## 2024-05-08 NOTE — PROGRESS NOTE ADULT - SUBJECTIVE AND OBJECTIVE BOX
Patient reports that she is planning to go home.  Reviewed PT note and is appropriate.   Discussed working with her left hand on cell phone.     FUNCTIONAL PROGRESS  5/8 PT  Bed Mobility  Bed Mobility Training Rehab Potential: good, to achieve stated therapy goals  Bed Mobility Training Rolling/Turning: independent  Bed Mobility Training Sit-to-Supine: independent  Bed Mobility Training Supine-to-Sit: independent    Sit-Stand Transfer Training  Sit-to-Stand Transfer Training Rehab Potential: good, to achieve stated therapy goals  Transfer Training Sit-to-Stand Transfer: supervsion;  supervision;  verbal cues  Transfer Training Stand-to-Sit Transfer: supervsion;  supervision;  verbal cues  Sit-to-Stand Transfer Training Transfer Safety Analysis: decreased balance;  impaired balance    Gait Training  Gait Training Rehab Potential: good, to achieve stated therapy goals  Gait Training: supervsion;  supervision;  verbal cues;  rolling walker;  75 feet  Gait Analysis: 2-point gait   impaired balance;  75 feet;  rolling walker    Stair Training  Physical Assist/Nonphysical Assist: supervision;  verbal cues  Assistive Device: left rail up;  right rail down  Number of Stairs: 3       VITALS  T(C): 36.9 (05-08-24 @ 12:27), Max: 36.9 (05-08-24 @ 12:27)  HR: 73 (05-08-24 @ 12:27) (55 - 73)  BP: 122/64 (05-08-24 @ 12:27) (114/64 - 131/67)  RR: 16 (05-08-24 @ 12:27) (16 - 18)  SpO2: 98% (05-08-24 @ 12:27) (96% - 98%)  Wt(kg): --    MEDICATIONS   acetaminophen     Tablet .. 650 milliGRAM(s) every 6 hours PRN  atorvastatin 20 milliGRAM(s) at bedtime  dexAMETHasone  Injectable 3 milliGRAM(s) every 6 hours  dextrose 50% Injectable 12.5 Gram(s) once  dextrose 50% Injectable 25 Gram(s) once  diltiazem    milliGRAM(s) daily  enoxaparin Injectable 40 milliGRAM(s) every 24 hours  hydrALAZINE Injectable 10 milliGRAM(s) every 2 hours PRN  HYDROmorphone  Injectable 0.5 milliGRAM(s) every 6 hours PRN  influenza  Vaccine (HIGH DOSE) 0.7 milliLiter(s) once  insulin lispro (ADMELOG) corrective regimen sliding scale   three times a day before meals  labetalol Injectable 10 milliGRAM(s) every 2 hours PRN  levETIRAcetam 500 milliGRAM(s) every 12 hours  ondansetron Injectable 4 milliGRAM(s) every 6 hours PRN  pantoprazole    Tablet 40 milliGRAM(s) before breakfast  polyethylene glycol 3350 17 Gram(s) daily  senna 2 Tablet(s) at bedtime  traMADol 25 milliGRAM(s) every 4 hours PRN  traMADol 50 milliGRAM(s) every 4 hours PRN      RECENT LABS/IMAGING  - Reviewed Today                        8.7    17.49 )-----------( 131      ( 07 May 2024 05:15 )             25.9     05-07    141  |  106  |  33.0<H>  ----------------------------<  163<H>  4.0   |  26.0  |  0.50    Ca    8.6      07 May 2024 05:15  Phos  2.8     05-07  Mg     2.1     05-07        Urinalysis Basic - ( 07 May 2024 05:15 )    Color: x / Appearance: x / SG: x / pH: x  Gluc: 163 mg/dL / Ketone: x  / Bili: x / Urobili: x   Blood: x / Protein: x / Nitrite: x   Leuk Esterase: x / RBC: x / WBC x   Sq Epi: x / Non Sq Epi: x / Bacteria: x            CT Head - right frontoparietal mass     MR Brain w/wo - 3.7 cm x 4.2 cm x 4.2 cm necrotic enhancing heterogeneous mass in the posterior right frontal lobe w/ 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     MRI HEAD 5/7 - Postsurgical changes involving the section of prior neoplasm. Small adjacent 2 cm infarction in the RIGHT posterior frontal lobe, medial to the surgical cavity, with restricted diffusion. Minimal air and hemorrhagic products are seen with in the surgical cavity.  ---------------------------------------------------------------------------------------  PHYSICAL EXAM  Constitutional - NAD, Comfortable  Extremities - No edema   Neurologic Exam -                    Cognitive - AAO to self, place, date, year, situation     Communication - Fluent, Mild dysarthria     Cranial Nerves - Left visuo-spatial deficits     FUNCTIONAL MOTOR EXAM - Left fine motor coordination deficits     Sensory - Intact to LT   Psychiatric - Mood stable, Affect WNL  ----------------------------------------------------------------------------------------  ASSESSMENT/PLAN  68yFemale with functional deficits after found to have a brain mass   Right Frontal Mass s/p Resection - Keppra, Decadron, Kefzol  CAD - Lipitor   HTN - Labetalol, Hydralazine  Pain - Tylenol, Tramadol, Dilaudid   Diet - NPO   DVT PPX - SCDs, Lovenox  Rehab/Impaired mobility and function - Patient continues to require hospitalization for the above diagnoses and ongoing active management of comorbid complications (IV Meds) that are substantially impairing functional ability and impairing quality of life.     RECOMMEND - OOB daily     Based on the patient's diagnosis, clinical exam, current functional status and  potential for progress, expect patient to achieve DC HOME.     Will continue to follow. Rehab recommendations are dependent on how functional progress changes as well as how patient continues to participate and tolerate therapeutic interventions, which may change. Recommend ongoing mobilization by staff to maintain cardiopulmonary function and prevention of secondary complications related to debility. Discussed the specific management and recommendations above with rehab clinical care team/rehab liaison.

## 2024-05-08 NOTE — PROGRESS NOTE ADULT - THIS PATIENT HAS THE FOLLOWING CONDITION(S)/DIAGNOSES ON THIS ADMISSION:
Cerebral Edema
Cerebral Edema
None
Encephalopathy/Brain Compression / Herniation
Brain Compression / Herniation
None
None
Encephalopathy
None

## 2024-05-08 NOTE — DIETITIAN INITIAL EVALUATION ADULT - ORAL INTAKE PTA/DIET HISTORY
Patient tolerating diet well with good appetite/PO intake. Reports she has been eating 100 of meals. PT states PTA her appetite was good. States she has been following a low salt, low fat, and no added sugar diet at home. Briefly reviewed DASH/TLC diet verbally with pt - pt already familiar with current diet restrictions and has no further questions at this time. No c/o N/V/C/D. Reports her UBW is about 148 lbs, current weight 140.8 lbs. Pt reporting she is very happy with this weight loss. Pt does not meet criteria for malnutrition at this time. Trace edema noted which may influence weights. Will continue to monitor and follow up as needed. RD remains available.

## 2024-05-08 NOTE — PROGRESS NOTE ADULT - SUBJECTIVE AND OBJECTIVE BOX
INTERVAL HPI/OVERNIGHT EVENTS:  This is a 68yf  Post op Day # 5   Pt is awake alert resp oriented x 3 denies headache. Pos left upper extrem weakness hand grasp 4/5 pt noted to have diff with fine motor skills noted 2 weeks prior to admission.     MEDICATIONS  (STANDING):  atorvastatin 20 milliGRAM(s) Oral at bedtime  dexAMETHasone  Injectable 3 milliGRAM(s) IV Push every 6 hours  dextrose 50% Injectable 12.5 Gram(s) IV Push once  dextrose 50% Injectable 25 Gram(s) IV Push once  diltiazem    milliGRAM(s) Oral daily  enoxaparin Injectable 40 milliGRAM(s) SubCutaneous every 24 hours  influenza  Vaccine (HIGH DOSE) 0.7 milliLiter(s) IntraMuscular once  insulin lispro (ADMELOG) corrective regimen sliding scale   SubCutaneous three times a day before meals  levETIRAcetam 500 milliGRAM(s) Oral every 12 hours  pantoprazole    Tablet 40 milliGRAM(s) Oral before breakfast  polyethylene glycol 3350 17 Gram(s) Oral daily  senna 2 Tablet(s) Oral at bedtime    MEDICATIONS  (PRN):  acetaminophen     Tablet .. 650 milliGRAM(s) Oral every 6 hours PRN Mild Pain (1 - 3)  hydrALAZINE Injectable 10 milliGRAM(s) IV Push every 2 hours PRN SBP > 140mm Hg  HYDROmorphone  Injectable 0.5 milliGRAM(s) IV Push every 6 hours PRN Severe Pain (7 - 10)  labetalol Injectable 10 milliGRAM(s) IV Push every 2 hours PRN Systolic blood pressure > 140  ondansetron Injectable 4 milliGRAM(s) IV Push every 6 hours PRN Nausea and/or Vomiting  traMADol 25 milliGRAM(s) Oral every 4 hours PRN Moderate Pain (4 - 6)  traMADol 50 milliGRAM(s) Oral every 4 hours PRN Severe Pain (7 - 10)      Allergies  oxycodone (Unknown)  Intolerances      Vital Signs Last 24 Hrs  T(C): 36.8 (08 May 2024 08:01), Max: 37 (07 May 2024 12:40)  T(F): 98.2 (08 May 2024 08:01), Max: 98.6 (07 May 2024 12:40)  HR: 69 (08 May 2024 08:01) (55 - 73)  BP: 123/65 (08 May 2024 08:01) (114/64 - 136/65)  BP(mean): --  RR: 16 (08 May 2024 08:01) (16 - 18)  SpO2: 98% (08 May 2024 08:01) (96% - 100%)    Parameters below as of 08 May 2024 08:01  Patient On (Oxygen Delivery Method): room air     BMI (kg/m2): 31 (05-03-24 @ 08:34)      PHYSICAL EXAM  GENERAL: NAD,   HEAD:  Atraumatic, Normocephalic  EYES: EOMI, PERRLA, conjunctiva and sclera clear  ENMT: No tonsillar erythema, exudates, or enlargement; Moist mucous membranes, Good dentition, No lesions  NECK: Supple,  NERVOUS SYSTEM:  Alert & Oriented X3, Good concentration; Motor Strength 5/5 B/L upper and lower extremities; left upper extrem hand grasp 4/5 DTRs 2+ intact and symmetric  CHEST/LUNG: Clear bilat  HEART: Regular rate and rhythm; No murmurs, rubs, or gallops  ABDOMEN: Soft, Nontender, Nondistended; Bowel sounds present  EXTREMITIES:  2+ Peripheral Pulses, No edema  Wound dressing clean and dry staples clean and dry intact, neg erythema.       LABS:                          8.7    17.49 )-----------( 131      ( 07 May 2024 05:15 )             25.9     05-07    141  |  106  |  33.0<H>  ----------------------------<  163<H>  4.0   |  26.0  |  0.50    Ca    8.6      07 May 2024 05:15  Phos  2.8     05-07  Mg     2.1     05-07    Urinalysis Basic - ( 07 May 2024 05:15 )    Color: x / Appearance: x / SG: x / pH: x  Gluc: 163 mg/dL / Ketone: x  / Bili: x / Urobili: x   Blood: x / Protein: x / Nitrite: x   Leuk Esterase: x / RBC: x / WBC x   Sq Epi: x / Non Sq Epi: x / Bacteria: x      I&O's Detail    08 May 2024 07:01  -  08 May 2024 12:01  --------------------------------------------------------  IN:    Oral Fluid: 240 mL  Total IN: 240 mL    OUT:  Total OUT: 0 mL    Total NET: 240 mL      RADIOLOGY & ADDITIONAL TESTS:   CT Head No Cont (05.04.24 @ 06:02) >  IMPRESSION:  Stable postoperative changes following right parietal craniotomy and   brain tumor resection.  ---End of Report ---    MR Head w/wo IV Cont (05.07.24 @ 04:28) >  IMPRESSION:  Postsurgical changes involving the section of prior   neoplasm. Small adjacent 2 cm infarction in the RIGHT posterior frontal   lobe, medial to the surgical cavity, with restricted diffusion. Minimal   air and hemorrhagic products are seen with in the surgical cavity.  --- End of Report ---

## 2024-05-08 NOTE — DISCHARGE NOTE NURSING/CASE MANAGEMENT/SOCIAL WORK - PATIENT PORTAL LINK FT
You can access the FollowMyHealth Patient Portal offered by [facility] by registering at the following website: [email or web address]. By joining [employer]’s FollowMyHealth portal, you will also be able to view your health information using other applications (apps) compatible with our system.

## 2024-05-08 NOTE — DISCHARGE NOTE PROVIDER - NSDCFUSCHEDAPPT_GEN_ALL_CORE_FT
Iftikhar Ashley  [facility]  NEUROLOGY [address]  Scheduled Appointment: 06/20/2024    William Guaman  [facility]  NEUROSURG [address] [place] [address]  Scheduled Appointment: 07/01/2024

## 2024-05-08 NOTE — DISCHARGE NOTE PROVIDER - NSDCCPTREATMENT_GEN_ALL_CORE_FT
PRINCIPAL PROCEDURE  Procedure: Craniotomy, using frameless stereotaxy, for tumor  Findings and Treatment: right sided for parietal tumor resection

## 2024-05-08 NOTE — DIETITIAN INITIAL EVALUATION ADULT - OTHER INFO
68 year-old female with PMHx Afib on ASA, HTN, HLD presents to [facility] for brain mass resection. Patient initially presented on 4/19/24 for 2 weeks of difficulty with fine motor skills (buttoning shirt, combing hair, holding utensils). CT Head at that time revealed a right frontoparietal mass for which neurosurgery was consulted. Pt was admitted to medicine for mets workup, CT CAP was negative. MR Brain w/wo showed 3.7 cm x 4.2 cm x 4.2 cm necrotic enhancing heterogeneous mass in the posterior right frontal lobe w/ moderate edema suspicious for Glioma neoplasm. Has been taking Keppra 500 BID for seizure ppx. Last dose of ASA was 4/28. Reports fine motor skills have been improving and otherwise feels well. Patient underwent a on 5/3/24 a cranio for brain tumor resection with  Dr. Guaman. Pt post op course was uneventful she has done well. She is noted to have coordination issue with some left upper extreme weakness. Pt has been recommended for acute rehab yet, she is considering to be discharged to home.

## 2024-05-08 NOTE — DISCHARGE NOTE PROVIDER - NSDCFUADDAPPT_GEN_ALL_CORE_FT
Please schedule follow up appointment with medical doctor to follow glucose and assure you are doing well with blood pressures.

## 2024-05-08 NOTE — PROGRESS NOTE ADULT - PROVIDER SPECIALTY LIST ADULT
Anesthesia
Brain Injury Medicine
Hospitalist
NSICU
Neurosurgery
Brain Injury Medicine
NSICU
Neurosurgery

## 2024-05-08 NOTE — DIETITIAN INITIAL EVALUATION ADULT - PERTINENT LABORATORY DATA
05-07    141  |  106  |  33.0<H>  ----------------------------<  163<H>  4.0   |  26.0  |  0.50    Ca    8.6      07 May 2024 05:15  Phos  2.8     05-07  Mg     2.1     05-07    POCT Blood Glucose.: 204 mg/dL (05-08-24 @ 11:52)  A1C with Estimated Average Glucose Result: 6.5 % (05-01-24 @ 04:40)

## 2024-05-08 NOTE — DISCHARGE NOTE PROVIDER - NSDCCPCAREPLAN_GEN_ALL_CORE_FT
PRINCIPAL DISCHARGE DIAGNOSIS  Diagnosis: Lesion of right parietal lobe of brain  Assessment and Plan of Treatment:

## 2024-05-08 NOTE — DISCHARGE NOTE PROVIDER - NSDCCAREPROVSEEN_GEN_ALL_CORE_FT
JOE Attg:    see above    patient seen and examined by PA staff with rounding team    agree with above

## 2024-05-08 NOTE — DIETITIAN INITIAL EVALUATION ADULT - PERTINENT MEDS FT
MEDICATIONS  (STANDING):  dexAMETHasone  Injectable 3 milliGRAM(s) IV Push every 6 hours  dextrose 50% Injectable 25 Gram(s) IV Push once  diltiazem    milliGRAM(s) Oral daily  enoxaparin Injectable 40 milliGRAM(s) SubCutaneous every 24 hours  insulin lispro (ADMELOG) corrective regimen sliding scale   SubCutaneous three times a day before meals  levETIRAcetam 500 milliGRAM(s) Oral every 12 hours  pantoprazole    Tablet 40 milliGRAM(s) Oral before breakfast  polyethylene glycol 3350 17 Gram(s) Oral daily  senna 2 Tablet(s) Oral at bedtime    MEDICATIONS  (PRN):  ondansetron Injectable 4 milliGRAM(s) IV Push every 6 hours PRN Nausea and/or Vomiting

## 2024-05-08 NOTE — DISCHARGE NOTE PROVIDER - NSDCHHNEEDSERVICE_GEN_ALL_CORE
Problem: Nutrition/Hydration-ADULT  Goal: Nutrient/Hydration intake appropriate for improving, restoring or maintaining nutritional needs  Description  Monitor and assess patient's nutrition/hydration status for malnutrition  Collaborate with interdisciplinary team and initiate plan and interventions as ordered  Monitor patient's weight and dietary intake as ordered or per policy  Utilize nutrition screening tool and intervene as necessary  Determine patient's food preferences and provide high-protein, high-caloric foods as appropriate       INTERVENTIONS:  - Monitor oral intake, urinary output, labs, and treatment plans  - Assess nutrition and hydration status and recommend course of action  - Evaluate amount of meals eaten  - Assist patient with eating if necessary   - Allow adequate time for meals  - Recommend/ encourage appropriate diets, oral nutritional supplements, and vitamin/mineral supplements  - Order, calculate, and assess calorie counts as needed  - Recommend, monitor, and adjust tube feedings and TPN/PPN based on assessed needs  - Assess need for intravenous fluids  - Provide specific nutrition/hydration education as appropriate  - Include patient/family/caregiver in decisions related to nutrition  Outcome: Progressing Wound care and assessment

## 2024-05-08 NOTE — PROGRESS NOTE ADULT - REASON FOR ADMISSION
R frontoparietal brain mass

## 2024-05-08 NOTE — DISCHARGE NOTE PROVIDER - NSDCMRMEDTOKEN_GEN_ALL_CORE_FT
atorvastatin 20 mg oral tablet: 1 tab(s) orally once a day  dexAMETHasone 2 mg oral tablet: 1 tab(s) orally every 6 hours 1 tab po q 6hrs 5/9 5/10; 1 tab po q8 5/11, 5/12; 1 tab po  q12 5/13,  5/14 then discontinue MDD: 4  Diltia  mg/24 hours oral capsule, extended release: 1 cap(s) orally once a day  levETIRAcetam 500 mg oral tablet: 1 tab(s) orally every 12 hours MDD: 2  pantoprazole 40 mg oral delayed release tablet: 1 tab(s) orally once a day (before a meal)

## 2024-05-08 NOTE — DISCHARGE NOTE PROVIDER - CARE PROVIDER_API CALL
Detail Level: Detailed William Guaman  Neurosurgery  [address] [place] [id number]  Phone: [phone]  Fax: [phone]  Follow Up Time: 2 weeks

## 2024-05-08 NOTE — DISCHARGE NOTE NURSING/CASE MANAGEMENT/SOCIAL WORK - NSDCPEFALRISK_GEN_ALL_CORE
For information on Fall & Injury Prevention, visit: [email or web address] OR  [email or web address] OR  https://www.cdc.gov/steadi/patient.html

## 2024-05-08 NOTE — DISCHARGE NOTE PROVIDER - NSDCFUADDINST_GEN_ALL_CORE_FT
Please make an appointment for follow up with neurosurgeon Dr. William Guaman' office in 1-2 weeks for wound check. Call [phone] to schedule an appointment. Sutures/staples will be removed at follow up appointment. Keep incision site clean and dry. No creams, lotions, or ointments to incision area. Ok to shower but do not allow water to hit incision site directly. Gently pat dry after shower.    NO heavy lifting, strenuous activity, twisting, bending, driving, or working until cleared by your physician.  Return to ER immediately for any of the following: fever, bleeding, new onset numbness/tingling/weakness, nausea and/or vomiting, chest pain, shortness of breath, confusion, seizure, altered mental status, urinary and/or fecal incontinence or retention.

## 2024-05-10 ENCOUNTER — OUTPATIENT (OUTPATIENT)
Dept: OUTPATIENT SERVICES | Facility: HOSPITAL | Age: 69
LOS: 1 days | Discharge: HOME | End: 2024-05-10
Payer: COMMERCIAL

## 2024-05-13 LAB — SURGICAL PATHOLOGY STUDY: SIGNIFICANT CHANGE UP

## 2024-05-16 ENCOUNTER — APPOINTMENT (OUTPATIENT)
Dept: NEUROLOGY | Facility: CLINIC | Age: 69
End: 2024-05-16
Payer: MEDICARE

## 2024-05-16 VITALS
RESPIRATION RATE: 17 BRPM | WEIGHT: 149.13 LBS | BODY MASS INDEX: 28.15 KG/M2 | HEART RATE: 118 BPM | OXYGEN SATURATION: 98 % | DIASTOLIC BLOOD PRESSURE: 67 MMHG | SYSTOLIC BLOOD PRESSURE: 109 MMHG | HEIGHT: 61 IN | TEMPERATURE: 98.2 F

## 2024-05-16 DIAGNOSIS — D49.6 NEOPLASM OF UNSPECIFIED BEHAVIOR OF BRAIN: ICD-10-CM

## 2024-05-16 DIAGNOSIS — I48.91 UNSPECIFIED ATRIAL FIBRILLATION: ICD-10-CM

## 2024-05-16 PROCEDURE — 99205 OFFICE O/P NEW HI 60 MIN: CPT

## 2024-05-16 RX ORDER — LEVETIRACETAM 500 MG/1
500 TABLET, FILM COATED ORAL TWICE DAILY
Qty: 60 | Refills: 5 | Status: ACTIVE | COMMUNITY
Start: 1900-01-01 | End: 1900-01-01

## 2024-05-16 RX ORDER — DEXAMETHASONE 2 MG/1
2 TABLET ORAL DAILY
Qty: 30 | Refills: 3 | Status: ACTIVE | COMMUNITY
Start: 2024-04-29 | End: 1900-01-01

## 2024-05-16 NOTE — HISTORY OF PRESENT ILLNESS
[FreeTextEntry1] : NEURO-ONCOLOGY  This 68 year old right handed woman is referred by Dr. William Guaman for my advice and opinion regarding glioblastoma  She presented with 3-4w of progressive left hand clumsiness and dropping things.  Her gait was a bit off as well.  An enhancing right frontal lesion was resected by Dr. Guaman on 5/3/24, revealing glioblastoma IDH wt, with small cell features.  Since surgery she's been slowly improving with residual LHP. No seizures.  She stopped steroids yesterday.  No headaches.    PMH:  Afib.  PSH: none SHX:  here with  [facility].  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

## 2024-05-16 NOTE — DATA REVIEWED
[de-identified] : Pre and post operative MRI brain form 4/24 and 5/24 show resection of right frontal enhancing tumor, with area of DWI medial to resection cavity

## 2024-05-16 NOTE — PHYSICAL EXAM
[FreeTextEntry1] :  Exam as below (with documented exceptions in parentheses):  General:  Healthy appearing woman well groomed and without deformities. KPS is 70 Incision intact  Mental Status:  Awake, alert and attentive. Oriented to person, place and time. Recent and remote memory intact. Normal concentration. Fluent spontaneous speech with intact naming and repetition. Normal fund of knowledge.    Cranial Nerves: II: Full visual fields. III,IV,VI:Pupils round, reactive to light. Full extraocular movements. No nystagmus. V: Normal bilateral bite, facial sensation. VII: No facial weakness. VIII: Hearing intact. IX,X: Palate midline, intact gag. XI:  Sternocleidomastoids normal. XII: Tongue protrudes midline. No dysarthria.   Motor:  Normal tone, bulk and power throughout including arms and legs, proximal and distal. No pronator drift. No abnormal movements.  (LUE drift, slowed FFM, intermittently drops things)  Sensation: Normal in arms, legs and trunk to pin, proprioception and vibration. Negative Romberg.   Coordination: No dysmetria or dysdiadochokinesis bilaterally. Normal heel-shin testing.   Gait: Normal including heel and toe walking. Normal station.  (walks with walker, mild LHP)  Reflexes: Normoactive and symmetric throughout. Absent Babinski bilaterally.   Vascular: No peripheral edema/calf tenderness.   Eyes: Funduscopic exam without papilledema (deferred)  Heart: Regular rate and rhythm. Normal s1-s2. No murmurs, rubs or gallops.   Respiratory: Lungs clear to auscultation bilaterally.   Abdomen: Nontender, non-distended. No hepatosplenomegaly. Normal bowel sounds in four quadrants.

## 2024-05-16 NOTE — DISCUSSION/SUMMARY
[FreeTextEntry1] : New GBM, s/p resection.  We discussed the diagnosis and up front management with RT and TMZ, possible clinical trial, and expectations of treatment (palliative). We discussed continuing steroids for now.   Reviewed risks of TMZ including constipation, cytopenias, nausea.     HBV today Pending Carid Dex 2 daily for now Keppra 500 bid for now Plan for TMZ 75/m2 RT eval pending OT/PT scrip next visit RTC 2w

## 2024-05-17 LAB
ALBUMIN SERPL ELPH-MCNC: 4.3 G/DL
ALP BLD-CCNC: 69 U/L
ALT SERPL-CCNC: 46 U/L
ANION GAP SERPL CALC-SCNC: 14 MMOL/L
AST SERPL-CCNC: 20 U/L
BASOPHILS # BLD AUTO: 0.01 K/UL
BASOPHILS NFR BLD AUTO: 0.1 %
BILIRUB SERPL-MCNC: 0.5 MG/DL
BUN SERPL-MCNC: 19 MG/DL
CALCIUM SERPL-MCNC: 8.9 MG/DL
CHLORIDE SERPL-SCNC: 100 MMOL/L
CO2 SERPL-SCNC: 26 MMOL/L
CREAT SERPL-MCNC: 0.58 MG/DL
EGFR: 98 ML/MIN/1.73M2
EOSINOPHIL # BLD AUTO: 0.03 K/UL
EOSINOPHIL NFR BLD AUTO: 0.4 %
GLUCOSE SERPL-MCNC: 110 MG/DL
HBV CORE IGG+IGM SER QL: NONREACTIVE
HBV SURFACE AB SER QL: NONREACTIVE
HBV SURFACE AG SER QL: NONREACTIVE
HCT VFR BLD CALC: 33.4 %
HGB BLD-MCNC: 10.8 G/DL
IMM GRANULOCYTES NFR BLD AUTO: 3.8 %
LYMPHOCYTES # BLD AUTO: 0.63 K/UL
LYMPHOCYTES NFR BLD AUTO: 9.3 %
MAN DIFF?: NORMAL
MCHC RBC-ENTMCNC: 30.8 PG
MCHC RBC-ENTMCNC: 32.3 GM/DL
MCV RBC AUTO: 95.2 FL
MONOCYTES # BLD AUTO: 0.52 K/UL
MONOCYTES NFR BLD AUTO: 7.7 %
NEUTROPHILS # BLD AUTO: 5.31 K/UL
NEUTROPHILS NFR BLD AUTO: 78.7 %
PLATELET # BLD AUTO: 225 K/UL
POTASSIUM SERPL-SCNC: 4.3 MMOL/L
PROT SERPL-MCNC: 6.2 G/DL
RBC # BLD: 3.51 M/UL
RBC # FLD: 16.8 %
SODIUM SERPL-SCNC: 141 MMOL/L
WBC # FLD AUTO: 6.76 K/UL

## 2024-05-17 NOTE — HISTORY OF PRESENT ILLNESS
[< 3 months] : less than 3 months [FreeTextEntry1] : left-sided weakness [de-identified] : Ms. Melvin presents for neurosurgical evaluation.  She was previously seen and evaluated by Dr. Cruz as an inpatient.  She opted for discharge and outpatient follow-up with me.  Her left-sided weakness is improved with Decadron.  She has not had a seizure.  She denies difficulty with walking.

## 2024-05-17 NOTE — ASSESSMENT
[FreeTextEntry1] : Ms. Fuchs presents for neurosurgical evaluation with history and imaging as above.  I explained the risks, benefits, and alternatives of tumor resection to the patient and her  as below:  benefit: hopeful decompression, hopeful tissue diagnosis, hopeful improvement in symptoms, hopeful prevention of progression of deficit alternative: no surgical intervention; continued surveillance risks: bleeding, infection, CSF leak, failure of procedure, need for re-operation, seizure, stroke, coma, death, DVT, PE, MI, PNA, UTI, difficulty/failure to intubate or extubate, new or worsening numbness, tingling, weakness, paralysis, sensory changes, difficulty/inability to ambulate, difficulty with expressive or receptive speech, altered personality or judgment, sexual dysfunction, incontinence  The patient verbalizes her understanding of the above.  I have answered all her questions.  She wishes to proceed with operative intervention.

## 2024-05-17 NOTE — PHYSICAL EXAM
[General Appearance - Alert] : alert [General Appearance - In No Acute Distress] : in no acute distress [General Appearance - Well Nourished] : well nourished [General Appearance - Well Developed] : well developed [General Appearance - Well-Appearing] : healthy appearing [Oriented To Time, Place, And Person] : oriented to person, place, and time [Impaired Insight] : insight and judgment were intact [Person] : oriented to person [Place] : oriented to place [Time] : oriented to time [Short Term Intact] : short term memory intact [Concentration Intact] : normal concentrating ability [Fluency] : fluency intact [Cranial Nerves Optic (II)] : visual acuity intact bilaterally,  pupils equal round and reactive to light [Cranial Nerves Oculomotor (III)] : extraocular motion intact [Cranial Nerves Facial (VII)] : face symmetrical [Cranial Nerves Hypoglossal (XII)] : there was no tongue deviation with protrusion [Motor Tone] : muscle tone was normal in all four extremities [Sensation Tactile Decrease] : light touch was intact [Abnormal Walk] : normal gait [FreeTextEntry6] : LUE 4+/5; 5/5 otherwise

## 2024-05-20 ENCOUNTER — EMERGENCY (EMERGENCY)
Facility: HOSPITAL | Age: 69
LOS: 1 days | Discharge: HOME | End: 2024-05-20
Attending: EMERGENCY MEDICINE
Payer: COMMERCIAL

## 2024-05-20 VITALS
OXYGEN SATURATION: 99 % | DIASTOLIC BLOOD PRESSURE: 55 MMHG | TEMPERATURE: 98 F | HEART RATE: 69 BPM | SYSTOLIC BLOOD PRESSURE: 114 MMHG | RESPIRATION RATE: 18 BRPM

## 2024-05-20 VITALS
HEIGHT: 61 IN | HEART RATE: 89 BPM | TEMPERATURE: 98 F | SYSTOLIC BLOOD PRESSURE: 113 MMHG | OXYGEN SATURATION: 98 % | DIASTOLIC BLOOD PRESSURE: 56 MMHG | RESPIRATION RATE: 20 BRPM | WEIGHT: 141.1 LBS

## 2024-05-20 LAB
ALBUMIN SERPL ELPH-MCNC: 3.7 G/DL — SIGNIFICANT CHANGE UP (ref 3.3–5.2)
ALP SERPL-CCNC: 85 U/L — SIGNIFICANT CHANGE UP (ref 40–120)
ALT FLD-CCNC: 29 U/L — SIGNIFICANT CHANGE UP
ANION GAP SERPL CALC-SCNC: 11 MMOL/L — SIGNIFICANT CHANGE UP (ref 5–17)
APTT BLD: 24.5 SEC — SIGNIFICANT CHANGE UP (ref 24.5–35.6)
AST SERPL-CCNC: 16 U/L — SIGNIFICANT CHANGE UP
BASOPHILS # BLD AUTO: 0 K/UL — SIGNIFICANT CHANGE UP (ref 0–0.2)
BASOPHILS NFR BLD AUTO: 0 % — SIGNIFICANT CHANGE UP (ref 0–2)
BILIRUB SERPL-MCNC: <0.2 MG/DL — LOW (ref 0.4–2)
BUN SERPL-MCNC: 20.7 MG/DL — HIGH (ref 8–20)
CALCIUM SERPL-MCNC: 8.6 MG/DL — SIGNIFICANT CHANGE UP (ref 8.4–10.5)
CHLORIDE SERPL-SCNC: 107 MMOL/L — SIGNIFICANT CHANGE UP (ref 96–108)
CO2 SERPL-SCNC: 26 MMOL/L — SIGNIFICANT CHANGE UP (ref 22–29)
CREAT SERPL-MCNC: 0.67 MG/DL — SIGNIFICANT CHANGE UP (ref 0.5–1.3)
EGFR: 94 ML/MIN/1.73M2 — SIGNIFICANT CHANGE UP
EOSINOPHIL # BLD AUTO: 0 K/UL — SIGNIFICANT CHANGE UP (ref 0–0.5)
EOSINOPHIL NFR BLD AUTO: 0 % — SIGNIFICANT CHANGE UP (ref 0–6)
GLUCOSE SERPL-MCNC: 119 MG/DL — HIGH (ref 70–99)
HCT VFR BLD CALC: 28.8 % — LOW (ref 34.5–45)
HGB BLD-MCNC: 9.4 G/DL — LOW (ref 11.5–15.5)
IMM GRANULOCYTES NFR BLD AUTO: 1.9 % — HIGH (ref 0–0.9)
INR BLD: 0.79 RATIO — LOW (ref 0.85–1.18)
LYMPHOCYTES # BLD AUTO: 0.56 K/UL — LOW (ref 1–3.3)
LYMPHOCYTES # BLD AUTO: 8.8 % — LOW (ref 13–44)
MCHC RBC-ENTMCNC: 30.9 PG — SIGNIFICANT CHANGE UP (ref 27–34)
MCHC RBC-ENTMCNC: 32.6 GM/DL — SIGNIFICANT CHANGE UP (ref 32–36)
MCV RBC AUTO: 94.7 FL — SIGNIFICANT CHANGE UP (ref 80–100)
MONOCYTES # BLD AUTO: 0.78 K/UL — SIGNIFICANT CHANGE UP (ref 0–0.9)
MONOCYTES NFR BLD AUTO: 12.3 % — SIGNIFICANT CHANGE UP (ref 2–14)
NEUTROPHILS # BLD AUTO: 4.89 K/UL — SIGNIFICANT CHANGE UP (ref 1.8–7.4)
NEUTROPHILS NFR BLD AUTO: 77 % — SIGNIFICANT CHANGE UP (ref 43–77)
PLATELET # BLD AUTO: 188 K/UL — SIGNIFICANT CHANGE UP (ref 150–400)
POTASSIUM SERPL-MCNC: 4.1 MMOL/L — SIGNIFICANT CHANGE UP (ref 3.5–5.3)
POTASSIUM SERPL-SCNC: 4.1 MMOL/L — SIGNIFICANT CHANGE UP (ref 3.5–5.3)
PROT SERPL-MCNC: 5.6 G/DL — LOW (ref 6.6–8.7)
PROTHROM AB SERPL-ACNC: 8.8 SEC — LOW (ref 9.5–13)
RBC # BLD: 3.04 M/UL — LOW (ref 3.8–5.2)
RBC # FLD: 15.9 % — HIGH (ref 10.3–14.5)
SODIUM SERPL-SCNC: 144 MMOL/L — SIGNIFICANT CHANGE UP (ref 135–145)
WBC # BLD: 6.35 K/UL — SIGNIFICANT CHANGE UP (ref 3.8–10.5)
WBC # FLD AUTO: 6.35 K/UL — SIGNIFICANT CHANGE UP (ref 3.8–10.5)

## 2024-05-20 PROCEDURE — 85730 THROMBOPLASTIN TIME PARTIAL: CPT

## 2024-05-20 PROCEDURE — 85025 COMPLETE CBC W/AUTO DIFF WBC: CPT

## 2024-05-20 PROCEDURE — 36415 COLL VENOUS BLD VENIPUNCTURE: CPT

## 2024-05-20 PROCEDURE — 93970 EXTREMITY STUDY: CPT | Mod: 26

## 2024-05-20 PROCEDURE — 85610 PROTHROMBIN TIME: CPT

## 2024-05-20 PROCEDURE — 99284 EMERGENCY DEPT VISIT MOD MDM: CPT | Mod: 25

## 2024-05-20 PROCEDURE — 93970 EXTREMITY STUDY: CPT

## 2024-05-20 PROCEDURE — 99285 EMERGENCY DEPT VISIT HI MDM: CPT

## 2024-05-20 PROCEDURE — 80053 COMPREHEN METABOLIC PANEL: CPT

## 2024-05-20 RX ORDER — LEVETIRACETAM 250 MG/1
500 TABLET, FILM COATED ORAL ONCE
Refills: 0 | Status: COMPLETED | OUTPATIENT
Start: 2024-05-20 | End: 2024-05-20

## 2024-05-20 RX ADMIN — LEVETIRACETAM 500 MILLIGRAM(S): 250 TABLET, FILM COATED ORAL at 19:13

## 2024-05-20 NOTE — ED ADULT TRIAGE NOTE - CHIEF COMPLAINT QUOTE
Pt was sent to ED by visiting nurse concern for l leg blood clot- pt  recently had surgery for glioblastoma tumor  removal- denies chest pain or SOB

## 2024-05-20 NOTE — ED PROVIDER NOTE - PATIENT PORTAL LINK FT
You can access the FollowMyHealth Patient Portal offered by [facility] by registering at the following website: [email or web address]. By joining [employer]’s FollowMyHealth portal, you will also be able to view your health information using other applications (apps) compatible with our system.

## 2024-05-20 NOTE — ED PROVIDER NOTE - CLINICAL SUMMARY MEDICAL DECISION MAKING FREE TEXT BOX
pt presentation is concerning for DVT. No vasc or neuro compromise to the lower extremity. No sign of compartment syndrome or PE. pt presentation is concerning for DVT. No vasc or neuro compromise to the lower extremity. No sign of compartment syndrome or PE.  Lilian CHAMORRO: Reviewed negative ultrasound study with patient and family at bedside.  Patient with no calf tenderness, does recount that she had a large IV in her groin for her recent surgery, edema may be secondary to dependent edema due to the recent instrumentation.  Advised to keep leg elevated when off her feet and consider compression stockings when ambulating.  Discussed results with neurosurgery who are comfortable plan for discharge and outpatient follow-up.  Return precautions given.

## 2024-05-20 NOTE — ED ADULT NURSE REASSESSMENT NOTE - NS ED NURSE REASSESS COMMENT FT1
pt. axo3 with equal and unlabored respirations awaiting sono and results. pt. axo3 with equal; and unlabored respirations. pt. showing no signs of distress at this time.

## 2024-05-20 NOTE — ED ADULT NURSE NOTE - OBJECTIVE STATEMENT
patient states that visiting nurse came patient states that visiting nurse came and noticed that her left leg was swollen and recommended coming to the ed.

## 2024-05-20 NOTE — ED PROVIDER NOTE - OBJECTIVE STATEMENT
69-year-old female past medical history of hypertension, paroxysmal A-fib, and a glioblastoma status post resection and craniotomy May 3 of this year with neurosurgery Dr. Guaman presents with left leg swelling.  Patient reports that she has had 2 to 3 days of left leg swelling and cramping.  She was seen by visiting nurse today who advised her to come here to be evaluated for DVT.  She denies any numbness, weakness, rash, warmth of the extremity.  No chest pain, shortness of breath.

## 2024-05-20 NOTE — ED PROVIDER NOTE - NSFOLLOWUPINSTRUCTIONS_ED_ALL_ED_FT
- Follow up with your Primary Care Doctor within the next 3-5 days.   - Bring results with you to the appointment.   -Keep leg elevated when off your feet, consider compression stockings when on your feet.  - Return to the Emergency Department for any new or worsening symptoms.

## 2024-05-21 ENCOUNTER — NON-APPOINTMENT (OUTPATIENT)
Age: 69
End: 2024-05-21

## 2024-05-22 ENCOUNTER — NON-APPOINTMENT (OUTPATIENT)
Age: 69
End: 2024-05-22

## 2024-05-22 LAB — GLUCOSE BLDC GLUCOMTR-MCNC: 150 MG/DL — HIGH (ref 70–99)

## 2024-05-23 ENCOUNTER — APPOINTMENT (OUTPATIENT)
Dept: NEUROSURGERY | Facility: CLINIC | Age: 69
End: 2024-05-23
Payer: MEDICARE

## 2024-05-23 VITALS
BODY MASS INDEX: 28.13 KG/M2 | WEIGHT: 149 LBS | TEMPERATURE: 98.2 F | HEART RATE: 82 BPM | SYSTOLIC BLOOD PRESSURE: 124 MMHG | OXYGEN SATURATION: 97 % | DIASTOLIC BLOOD PRESSURE: 56 MMHG | HEIGHT: 61 IN

## 2024-05-23 DIAGNOSIS — R73.03 PREDIABETES.: ICD-10-CM

## 2024-05-23 PROCEDURE — 99024 POSTOP FOLLOW-UP VISIT: CPT

## 2024-05-23 NOTE — REASON FOR VISIT
[Family Member] : family member [de-identified] : Hospital Course:  Discharge Date 08-May-2024  Admission Date 30-Apr-2024 08:46  Reason for Admission R frontoparietal brain mass  Hospital Course   68 year-old female with PMHx Afib on ASA, HTN, HLD presents to [facility] for brain  mass resection. Patient initially presented on 4/19/24 for 2 weeks of  difficulty with fine motor skills (buttoning shirt, combing hair, holding  utensils). CT Head at that time revealed a right frontoparietal mass for which  neurosurgery was consulted. Pt was admitted to medicine for mets workup, CT CAP  was negative. MR Brain w/wo showed 3.7 cm x 4.2 cm x 4.2 cm necrotic enhancing  heterogeneous mass in the posterior right frontal lobe w/ moderate edema  suspicious for Glioma neoplasm. Has been taking Keppra 500 BID for seizure ppx.  Last dose of ASA was 4/28. Reports fine motor skills have been improving and  otherwise feels well. Patient underwent a on 5/3/24 a cranio for brain tumor  resection with  Dr. Guaman. Patient presents with daughter and  She denies any headaches, n/v or vision changes.  She has not had any seizures.   Denies any numbness/tingling or weakness of extremities.   Ambulating independently.  No balance or gait disturbances.   No unexplained falls.

## 2024-05-23 NOTE — ASSESSMENT
[FreeTextEntry1] : Ms. Fuchs is doing well from the postoperative perspective.  She is neurologically intact at today's visit.  But she does have some left-sided residual weakness.  Incision is dry and intact staples were removed.  There is no swelling or evidence of drainage.  She continues to take dexamethasone twice daily and Keppra 500 twice daily for seizure prophylaxis.  She is very accompanied by her daughter and  who states she is doing well.  She was recently seen and evaluated in the ER for complaints of right lower extremity swelling this swelling and no evidence of DVT and discharged home.  She is will be seeing radiation and neuro oncology this week.  Final pathology reveals glioblastoma who grade 4.  Plan keep incision clean and dry.  Follow-up in 2 weeks for wound check. Continue with Keppra and dexamethasone as directed. Elevate lower extremities Patient has been given an opportunity to ask and have their questions answered to their satisfaction. Patient knows to call the office if there are any new or worsening symptoms.   I, Dr. William Guaman, personally performed the evaluation and management (E/M) services for this established patient who presents today. That E/M includes conducting the clinically appropriate interval history &/or exam and establishing a continued plan of care. Today, my LUCIA, Katalina JerezAdtradeRosa, was here to observe my evaluation and management service for this patient and follow the plan of care established by me going forward.

## 2024-05-23 NOTE — PHYSICAL EXAM
[General Appearance - Alert] : alert [General Appearance - In No Acute Distress] : in no acute distress [Clean] : clean [Dry] : dry [Intact] : intact [Oriented To Time, Place, And Person] : oriented to person, place, and time [Person] : oriented to person [Place] : oriented to place [Time] : oriented to time [Short Term Intact] : short term memory intact [Remote Intact] : remote memory intact [Span Intact] : the attention span was normal [Concentration Intact] : normal concentrating ability [Fluency] : fluency intact [Comprehension] : comprehension intact [Current Events] : adequate knowledge of current events [Past History] : adequate knowledge of personal past history [Vocabulary] : adequate range of vocabulary [Cranial Nerves Optic (II)] : visual acuity intact bilaterally,  pupils equal round and reactive to light [Cranial Nerves Oculomotor (III)] : extraocular motion intact [Cranial Nerves Trigeminal (V)] : facial sensation intact symmetrically [Cranial Nerves Facial (VII)] : face symmetrical [Cranial Nerves Vestibulocochlear (VIII)] : hearing was intact bilaterally [Cranial Nerves Glossopharyngeal (IX)] : tongue and palate midline [Cranial Nerves Accessory (XI - Cranial And Spinal)] : head turning and shoulder shrug symmetric [Cranial Nerves Hypoglossal (XII)] : there was no tongue deviation with protrusion [Motor Tone] : muscle tone was normal in all four extremities [Motor Strength] : muscle strength was normal in all four extremities [No Muscle Atrophy] : normal bulk in all four extremities [Sensation Tactile Decrease] : light touch was intact [Abnormal Walk] : normal gait [Balance] : balance was intact [2+] : Patella left 2+ [No Visual Abnormalities] : no visible abnormailities [FreeTextEntry1] : right scalp  [Past-pointing] : there was no past-pointing [Tremor] : no tremor present

## 2024-05-28 ENCOUNTER — APPOINTMENT (OUTPATIENT)
Dept: RADIATION ONCOLOGY | Facility: CLINIC | Age: 69
End: 2024-05-28
Payer: MEDICARE

## 2024-05-28 ENCOUNTER — NON-APPOINTMENT (OUTPATIENT)
Age: 69
End: 2024-05-28

## 2024-05-28 VITALS
BODY MASS INDEX: 27 KG/M2 | OXYGEN SATURATION: 97 % | HEART RATE: 77 BPM | RESPIRATION RATE: 16 BRPM | SYSTOLIC BLOOD PRESSURE: 127 MMHG | WEIGHT: 143 LBS | HEIGHT: 61 IN | DIASTOLIC BLOOD PRESSURE: 86 MMHG

## 2024-05-28 DIAGNOSIS — Z80.3 FAMILY HISTORY OF MALIGNANT NEOPLASM OF BREAST: ICD-10-CM

## 2024-05-28 PROCEDURE — 99205 OFFICE O/P NEW HI 60 MIN: CPT

## 2024-05-28 RX ORDER — DILTIAZEM HYDROCHLORIDE 60 MG/1
TABLET, COATED ORAL
Refills: 0 | Status: ACTIVE | COMMUNITY

## 2024-05-28 NOTE — REASON FOR VISIT
[Consideration of Curative Therapy] : consideration of curative therapy for [Brain Tumor] : brain tumor [Spouse] : spouse

## 2024-05-28 NOTE — LETTER CLOSING
[Consult Closing:] : Thank you for allowing me to participate in the care of this patient.  If you have any questions, please do not hesitate to contact me. [Sincerely yours,] : Sincerely yours, [FreeTextEntry3] : Kwan Reyes MD  of Radiation Medicine, Quality and  of Radiation Medicine [facility] [employer] [facility] at [facility]

## 2024-05-28 NOTE — REVIEW OF SYSTEMS
[Gait Disturbance] : gait disturbance [Difficulty Walking] : difficulty walking [Negative] : Allergic/Immunologic [Confused] : no confusion [Dizziness] : no dizziness [Fainting] : no fainting

## 2024-05-28 NOTE — PHYSICAL EXAM
[Sclera] : the sclera and conjunctiva were normal [PERRL With Normal Accommodation] : pupils were equal in size, round, reactive to light [Extraocular Movements] : extraocular movements were intact [Outer Ear] : the ears and nose were normal in appearance [Heart Rate And Rhythm] : heart rate and rhythm were normal [Heart Sounds] : normal S1 and S2 [Bowel Sounds] : normal bowel sounds [Abdomen Soft] : soft [Nondistended] : nondistended [Normal] : oriented to person, place and time, the affect was normal, the mood was normal and not anxious [de-identified] : well-healing surgical incision right frontoparietal [de-identified] : gait aided by walker

## 2024-05-28 NOTE — HISTORY OF PRESENT ILLNESS
[FreeTextEntry1] : 69-year-old woman presents today for consultation regarding radiation therapy for glioblastoma.  She is accompanied by her  Bello.  She presented to [facility] ED on 4/19/2024 with a 2-week history of "coordination problem."  She also experienced chest tightness and nausea while cleaning the yard.    4/19/2024 CT brain, CTA brain and CTA neck showed a 4.6 x 3.5 x 4.0 cm area of abnormal density at the junction of the right frontoparietal lobes.  4/19/2024 CT chest, abdomen and pelvis showed no evidence of primary malignancy or metastatic disease in the chest, abdomen or pelvis.    4/20/24 MRI head showed a 4.2 x 4.2 x 3 centimeters necrotic enhancing heterogeneous mass in the posterior right frontal lobe, with moderate edema.  There is mild effacement of the right lateral ventricle with a 4.6 mm subfalcine herniation to the left.  She underwent right craniotomy by Dr. William Guaman on 5/3/2024, with pathology showing high-grade glioma consistent with glioblastoma (WHO grade 4), IDH wild-type, with small cell features.  5/7/2024 postoperative MRI head showed postsurgical changes involving the section of prior neoplasm. Small adjacent 2 cm infarction in the RIGHT posterior frontal lobe, medial to the surgical cavity, with restricted diffusion. Minimal air and hemorrhagic products are seen with in the surgical cavity.  She met with Dr. Iftikhar Ashley on 5/16/24.  The patient denies headaches, nausea, vomiting, diplopia, blurry vision, seizures, pain, fatigue.  She reports left-sided clumsiness continues and had several falls described as "easing to the ground" and is able to get up off the ground without assistance.  Notes she continues dexamethasone 2 mg/day, and levetiracetam.   Care team: Neurosurgeon: Dr. William Guaman Neuro Oncology: Dr. Iftikhar Ashley

## 2024-05-28 NOTE — DISEASE MANAGEMENT
[Clinical] : TNM Stage: c [N/A] : Currently not applicable [FreeTextEntry4] : glioblastoma [TTNM] : - [NTNM] : - [MTNM] : -

## 2024-05-30 ENCOUNTER — APPOINTMENT (OUTPATIENT)
Dept: NEUROLOGY | Facility: CLINIC | Age: 69
End: 2024-05-30
Payer: MEDICARE

## 2024-05-30 VITALS
HEIGHT: 61 IN | BODY MASS INDEX: 27.2 KG/M2 | DIASTOLIC BLOOD PRESSURE: 67 MMHG | OXYGEN SATURATION: 95 % | SYSTOLIC BLOOD PRESSURE: 115 MMHG | HEART RATE: 72 BPM | WEIGHT: 144.05 LBS

## 2024-05-30 PROCEDURE — 99215 OFFICE O/P EST HI 40 MIN: CPT

## 2024-05-30 RX ORDER — ONDANSETRON 8 MG/1
8 TABLET ORAL
Qty: 30 | Refills: 2 | Status: ACTIVE | COMMUNITY
Start: 2024-05-30 | End: 1900-01-01

## 2024-05-30 RX ORDER — TEMOZOLOMIDE 100 MG/1
100 CAPSULE ORAL
Qty: 42 | Refills: 0 | Status: ACTIVE | COMMUNITY
Start: 2024-05-30 | End: 1900-01-01

## 2024-05-30 RX ORDER — TEMOZOLOMIDE 5 MG/1
5 CAPSULE ORAL
Qty: 42 | Refills: 0 | Status: ACTIVE | COMMUNITY
Start: 2024-05-30 | End: 1900-01-01

## 2024-05-30 RX ORDER — TEMOZOLOMIDE 20 MG/1
20 CAPSULE ORAL
Qty: 42 | Refills: 0 | Status: ACTIVE | COMMUNITY
Start: 2024-05-30 | End: 1900-01-01

## 2024-05-30 NOTE — HISTORY OF PRESENT ILLNESS
[FreeTextEntry1] : NEURO-ONCOLOGY  This 68 year old right handed woman is seen in follow up regarding glioblastoma  She presented with 3-4w of progressive left hand clumsiness and dropping things.  Her gait was a bit off as well.  An enhancing right frontal lesion was resected by Dr. Guaman on 5/3/24, revealing glioblastoma IDH wt, with small cell features.  Caris with MGMT methylated, mutations in CDKN2A/B deletion, TERT promoter, PIK3CA, NF1, MTAP, and TMB 4.  Since surgery she's been slowly improving with residual LHP. No seizures.    INTERVAL HISTORY:   Improving gait.  No new complaints.   HBV neg.   PMH:  Afib.  PSH: none SHX:  here with  Bello.  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

## 2024-05-30 NOTE — DISCUSSION/SUMMARY
[FreeTextEntry1] : New GBM, s/p resection.  Discussed TMZ and RT, and associated risks.     TMZ 75/m2 to begin with RT Dex 2 daily for now Keppra 500 bid for now OT/PT scrip next visit Will need weekly CBCs RTC 3w

## 2024-05-30 NOTE — DATA REVIEWED
[de-identified] : Pre and post operative MRI brain form 4/24 and 5/24 show resection of right frontal enhancing tumor, with area of DWI medial to resection cavity

## 2024-05-31 DIAGNOSIS — C71.1 MALIGNANT NEOPLASM OF FRONTAL LOBE: ICD-10-CM

## 2024-05-31 PROCEDURE — 77263 THER RADIOLOGY TX PLNG CPLX: CPT

## 2024-05-31 PROCEDURE — 77470 SPECIAL RADIATION TREATMENT: CPT | Mod: 26

## 2024-05-31 PROCEDURE — 77334 RADIATION TREATMENT AID(S): CPT | Mod: 26

## 2024-06-03 ENCOUNTER — APPOINTMENT (OUTPATIENT)
Dept: NEUROSURGERY | Facility: CLINIC | Age: 69
End: 2024-06-03
Payer: MEDICARE

## 2024-06-03 VITALS
HEIGHT: 61 IN | WEIGHT: 143 LBS | BODY MASS INDEX: 27 KG/M2 | DIASTOLIC BLOOD PRESSURE: 75 MMHG | SYSTOLIC BLOOD PRESSURE: 134 MMHG | OXYGEN SATURATION: 97 % | HEART RATE: 78 BPM | TEMPERATURE: 97.3 F

## 2024-06-03 PROCEDURE — 99024 POSTOP FOLLOW-UP VISIT: CPT

## 2024-06-03 NOTE — REASON FOR VISIT
[de-identified] : Hospital Course:  Discharge Date 08-May-2024  Admission Date 30-Apr-2024 08:46  Reason for Admission R frontoparietal brain mass  Hospital Course   68 year-old female with PMHx Afib on ASA, HTN, HLD presents to [facility] for brain  mass resection. Patient initially presented on 4/19/24 for 2 weeks of  difficulty with fine motor skills (buttoning shirt, combing hair, holding  utensils). CT Head at that time revealed a right frontoparietal mass for which  neurosurgery was consulted. Pt was admitted to medicine for mets workup, CT CAP  was negative. MR Brain w/wo showed 3.7 cm x 4.2 cm x 4.2 cm necrotic enhancing  heterogeneous mass in the posterior right frontal lobe w/ moderate edema  suspicious for Glioma neoplasm. Has been taking Keppra 500 BID for seizure ppx.  Last dose of ASA was 4/28. Reports fine motor skills have been improving and  otherwise feels well. Patient underwent a on 5/3/24 a cranio for brain tumor  resection with  Dr. Guaman. Patient presents with daughter and  She denies any headaches, n/v or vision changes.  She has not had any seizures.   Denies any numbness/tingling or weakness of extremities.   Ambulating independently.  No balance or gait disturbances.   No unexplained falls.  [Family Member] : family member

## 2024-06-03 NOTE — PHYSICAL EXAM
[General Appearance - Alert] : alert [General Appearance - In No Acute Distress] : in no acute distress [Clean] : clean [Dry] : dry [Intact] : intact [FreeTextEntry1] : right scalp  [Oriented To Time, Place, And Person] : oriented to person, place, and time [Person] : oriented to person [Place] : oriented to place [Time] : oriented to time [Short Term Intact] : short term memory intact [Remote Intact] : remote memory intact [Span Intact] : the attention span was normal [Concentration Intact] : normal concentrating ability [Fluency] : fluency intact [Comprehension] : comprehension intact [Current Events] : adequate knowledge of current events [Past History] : adequate knowledge of personal past history [Vocabulary] : adequate range of vocabulary [Cranial Nerves Optic (II)] : visual acuity intact bilaterally,  pupils equal round and reactive to light [Cranial Nerves Oculomotor (III)] : extraocular motion intact [Cranial Nerves Trigeminal (V)] : facial sensation intact symmetrically [Cranial Nerves Facial (VII)] : face symmetrical [Cranial Nerves Vestibulocochlear (VIII)] : hearing was intact bilaterally [Cranial Nerves Glossopharyngeal (IX)] : tongue and palate midline [Cranial Nerves Accessory (XI - Cranial And Spinal)] : head turning and shoulder shrug symmetric [Cranial Nerves Hypoglossal (XII)] : there was no tongue deviation with protrusion [Motor Tone] : muscle tone was normal in all four extremities [Motor Strength] : muscle strength was normal in all four extremities [No Muscle Atrophy] : normal bulk in all four extremities [Sensation Tactile Decrease] : light touch was intact [Abnormal Walk] : normal gait [Balance] : balance was intact [Past-pointing] : there was no past-pointing [Tremor] : no tremor present [2+] : Patella left 2+ [No Visual Abnormalities] : no visible abnormailities

## 2024-06-03 NOTE — ASSESSMENT
[FreeTextEntry1] : Ms. Fuchs is doing well from the postoperative perspective.  She is neurologically intact at today's visit.  But she does have some left-sided residual weakness.  Incision is dry and intact staples were removed.  There is no swelling or evidence of drainage.  She continues to take dexamethasone twice daily and Keppra 500 twice daily for seizure prophylaxis.  She is very accompanied by her daughter and  who states she is doing well.  She was recently seen and evaluated in the ER for complaints of right lower extremity swelling this swelling and no evidence of DVT and discharged home.  She is will be seeing radiation and neuro oncology this week.  Final pathology reveals glioblastoma who grade 4.  Plan keep incision clean and dry.  Follow-up in 2 weeks for wound check. Continue with Keppra and dexamethasone as directed. Elevate lower extremities Patient has been given an opportunity to ask and have their questions answered to their satisfaction. Patient knows to call the office if there are any new or worsening symptoms.   I, Dr. William Guaman, personally performed the evaluation and management (E/M) services for this established patient who presents today. That E/M includes conducting the clinically appropriate interval history &/or exam and establishing a continued plan of care. Today, my LUCIA, Katalina JerezDinnDinnRosa, was here to observe my evaluation and management service for this patient and follow the plan of care established by me going forward.

## 2024-06-06 PROCEDURE — 77338 DESIGN MLC DEVICE FOR IMRT: CPT | Mod: 26

## 2024-06-06 PROCEDURE — 77301 RADIOTHERAPY DOSE PLAN IMRT: CPT | Mod: 26

## 2024-06-06 PROCEDURE — 77300 RADIATION THERAPY DOSE PLAN: CPT | Mod: 26

## 2024-06-12 NOTE — OCCUPATIONAL THERAPY INITIAL EVALUATION ADULT - PRECAUTIONS/LIMITATIONS, REHAB EVAL
Marta Plascencia, APRN  [id number] [address]  [phone]    [id number]    
fall precautions
+ light sensitivity precautions/fall precautions

## 2024-06-17 PROCEDURE — 77427 RADIATION TX MANAGEMENT X5: CPT

## 2024-06-17 PROCEDURE — 77387C: CUSTOM

## 2024-06-18 PROCEDURE — 77387B: CUSTOM | Mod: 26

## 2024-06-19 PROCEDURE — 77387B: CUSTOM | Mod: 26

## 2024-06-20 ENCOUNTER — APPOINTMENT (OUTPATIENT)
Dept: NEUROLOGY | Facility: CLINIC | Age: 69
End: 2024-06-20
Payer: MEDICARE

## 2024-06-20 ENCOUNTER — RESULT REVIEW (OUTPATIENT)
Age: 69
End: 2024-06-20

## 2024-06-20 ENCOUNTER — OUTPATIENT (OUTPATIENT)
Dept: OUTPATIENT SERVICES | Facility: HOSPITAL | Age: 69
LOS: 1 days | Discharge: HOME | End: 2024-06-20

## 2024-06-20 ENCOUNTER — NON-APPOINTMENT (OUTPATIENT)
Age: 69
End: 2024-06-20

## 2024-06-20 ENCOUNTER — APPOINTMENT (OUTPATIENT)
Dept: HEMATOLOGY ONCOLOGY | Facility: CLINIC | Age: 69
End: 2024-06-20

## 2024-06-20 VITALS
BODY MASS INDEX: 27 KG/M2 | WEIGHT: 143 LBS | DIASTOLIC BLOOD PRESSURE: 73 MMHG | OXYGEN SATURATION: 96 % | SYSTOLIC BLOOD PRESSURE: 122 MMHG | HEIGHT: 61 IN | RESPIRATION RATE: 17 BRPM | TEMPERATURE: 97.2 F | HEART RATE: 73 BPM

## 2024-06-20 DIAGNOSIS — D64.9 ANEMIA, UNSPECIFIED: ICD-10-CM

## 2024-06-20 DIAGNOSIS — G81.94 HEMIPLEGIA, UNSPECIFIED AFFECTING LEFT NONDOMINANT SIDE: ICD-10-CM

## 2024-06-20 LAB
BASOPHILS # BLD AUTO: 0 K/UL — SIGNIFICANT CHANGE UP (ref 0–0.2)
BASOPHILS NFR BLD AUTO: 0.2 % — SIGNIFICANT CHANGE UP (ref 0–2)
EOSINOPHIL # BLD AUTO: 0 K/UL — SIGNIFICANT CHANGE UP (ref 0–0.5)
EOSINOPHIL NFR BLD AUTO: 0 % — SIGNIFICANT CHANGE UP (ref 0–6)
HCT VFR BLD CALC: 37.8 % — SIGNIFICANT CHANGE UP (ref 34.5–45)
HGB BLD-MCNC: 13.1 G/DL — SIGNIFICANT CHANGE UP (ref 11.5–15.5)
LYMPHOCYTES # BLD AUTO: 0.7 K/UL — LOW (ref 1–3.3)
LYMPHOCYTES # BLD AUTO: 6.9 % — LOW (ref 13–44)
MCHC RBC-ENTMCNC: 32 PG — SIGNIFICANT CHANGE UP (ref 27–34)
MCHC RBC-ENTMCNC: 34.7 G/DL — SIGNIFICANT CHANGE UP (ref 32–36)
MCV RBC AUTO: 92.3 FL — SIGNIFICANT CHANGE UP (ref 80–100)
MONOCYTES # BLD AUTO: 0.4 K/UL — SIGNIFICANT CHANGE UP (ref 0–0.9)
MONOCYTES NFR BLD AUTO: 4.1 % — SIGNIFICANT CHANGE UP (ref 2–14)
NEUTROPHILS # BLD AUTO: 8.6 K/UL — HIGH (ref 1.8–7.4)
NEUTROPHILS NFR BLD AUTO: 88.7 % — HIGH (ref 43–77)
PLATELET # BLD AUTO: 214 K/UL — SIGNIFICANT CHANGE UP (ref 150–400)
RBC # BLD: 4.09 M/UL — SIGNIFICANT CHANGE UP (ref 3.8–5.2)
RBC # FLD: 13.1 % — SIGNIFICANT CHANGE UP (ref 10.3–14.5)
WBC # BLD: 9.7 K/UL — SIGNIFICANT CHANGE UP (ref 3.8–10.5)
WBC # FLD AUTO: 9.7 K/UL — SIGNIFICANT CHANGE UP (ref 3.8–10.5)

## 2024-06-20 PROCEDURE — 77014: CPT | Mod: 26

## 2024-06-20 PROCEDURE — 99215 OFFICE O/P EST HI 40 MIN: CPT

## 2024-06-20 RX ORDER — PANTOPRAZOLE 40 MG/1
40 TABLET, DELAYED RELEASE ORAL
Qty: 30 | Refills: 4 | Status: ACTIVE | COMMUNITY
Start: 2024-06-20 | End: 1900-01-01

## 2024-06-20 NOTE — HISTORY OF PRESENT ILLNESS
[FreeTextEntry1] : NEURO-ONCOLOGY  This 68 year old right handed woman is seen in follow up regarding glioblastoma  She presented with 3-4w of progressive left hand clumsiness and dropping things.  Her gait was a bit off as well.  An enhancing right frontal lesion was resected by Dr. Guaman on 5/3/24, revealing glioblastoma IDH wt, with small cell features.  Caris with MGMT methylated, mutations in CDKN2A/B deletion, TERT promoter, PIK3CA, NF1, MTAP, and TMB 4. No history of seizures.     INTERVAL HISTORY:   She is frustrated by dropping things and left sided neglect.  Started chemoRT without issue.    PMH:  Afib.  PSH: none SHX:  here with  Bello.  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

## 2024-06-20 NOTE — DISEASE MANAGEMENT
[Clinical] : TNM Stage: c [N/A] : Currently not applicable [FreeTextEntry4] : glioblastoma [TTNM] : - [NTNM] : - [MTNM] : - [de-identified] : 400 [de-identified] : 7637 [de-identified] : glioblastoma

## 2024-06-20 NOTE — PHYSICAL EXAM
[FreeTextEntry1] : Exam as below (with documented exceptions in parentheses):  General:  Healthy appearing woman well groomed and without deformities. KPS is 69  Mental Status:  Awake, alert and attentive. Oriented to person, place and time. Recent and remote memory intact. Normal concentration. Fluent spontaneous speech with intact naming and repetition. Normal fund of knowledge.    Cranial Nerves: II: Full visual fields. III,IV,VI:Pupils round, reactive to light. Full extraocular movements. No nystagmus. V: Normal bilateral bite, facial sensation. VII: No facial weakness. VIII: Hearing intact. IX,X: Palate midline, intact gag. XI:  Sternocleidomastoids normal. XII: Tongue protrudes midline. No dysarthria.   Motor:  Normal tone, bulk and power throughout including arms and legs, proximal and distal. No pronator drift. No abnormal movements.  (LUE now without drift, improved FFM, not dropping things today)  Sensation: Normal in arms, legs and trunk to pin, proprioception and vibration. Negative Romberg.   Coordination: No dysmetria or dysdiadochokinesis bilaterally. Normal heel-shin testing.   Gait: Normal including heel and toe walking. Normal station.  (walks with cane, mild LHP)  Reflexes: Normoactive and symmetric throughout. Absent Babinski bilaterally.   Vascular: No peripheral edema/calf tenderness.   Eyes: Funduscopic exam without papilledema (deferred)  Heart: Regular rate and rhythm. Normal s1-s2. No murmurs, rubs or gallops.   Respiratory: Lungs clear to auscultation bilaterally.   Abdomen: Nontender, non-distended. No hepatosplenomegaly. Normal bowel sounds in four quadrants.

## 2024-06-20 NOTE — DISCUSSION/SUMMARY
[FreeTextEntry1] : New GBM, MGMT methylated s/p resection..Ongoing chemoRT.    TUMOR:   Ongoing TMZ nightly.    BRAIN EDEMA: dex to 1 daily  SUPPORTIVE: taper keppra to 250 bid.  PPI refilled  HEME: weekly CBC coordinated  HEMIPARESIS: improved on my exam, referred for PT/OT  RTC 3w

## 2024-06-20 NOTE — PHYSICAL EXAM
[Normal] : oriented to person, place and time, the affect was normal, the mood was normal and not anxious [Extraocular Movements] : extraocular movements were intact [Skin Color & Pigmentation] : normal skin color and pigmentation

## 2024-06-20 NOTE — DATA REVIEWED
[de-identified] : Pre and post operative MRI brain form 4/24 and 5/24 show resection of right frontal enhancing tumor, with area of DWI medial to resection cavity

## 2024-06-20 NOTE — REVIEW OF SYSTEMS
[Nausea: Grade 0] : Nausea: Grade 0 [Vomiting: Grade 0] : Vomiting: Grade 0 [Edema Limbs: Grade 0] : Edema Limbs: Grade 0  [Fatigue: Grade 0] : Fatigue: Grade 0 [Localized Edema: Grade 0] : Localized Edema: Grade 0  [Neck Edema: Grade 0] : Neck Edema: Grade 0 [Blurred Vision: Grade 0] : Blurred Vision: Grade 0 [Dizziness: Grade 0] : Dizziness: Grade 0  [Headache: Grade 0] : Headache: Grade 0 [Alopecia: Grade 0] : Alopecia: Grade 0

## 2024-06-21 PROCEDURE — 77387B: CUSTOM | Mod: 26

## 2024-06-21 NOTE — PHYSICAL EXAM
[Extraocular Movements] : extraocular movements were intact [Skin Color & Pigmentation] : normal skin color and pigmentation [Normal] : oriented to person, place and time, the affect was normal, the mood was normal and not anxious

## 2024-06-24 PROCEDURE — 77427 RADIATION TX MANAGEMENT X5: CPT

## 2024-06-24 PROCEDURE — 77387B: CUSTOM | Mod: 26

## 2024-06-25 PROCEDURE — 77387B: CUSTOM | Mod: 26

## 2024-06-26 PROCEDURE — 77387B: CUSTOM | Mod: 26

## 2024-06-27 ENCOUNTER — RESULT REVIEW (OUTPATIENT)
Age: 69
End: 2024-06-27

## 2024-06-27 ENCOUNTER — APPOINTMENT (OUTPATIENT)
Dept: HEMATOLOGY ONCOLOGY | Facility: CLINIC | Age: 69
End: 2024-06-27

## 2024-06-27 ENCOUNTER — NON-APPOINTMENT (OUTPATIENT)
Age: 69
End: 2024-06-27

## 2024-06-27 VITALS
SYSTOLIC BLOOD PRESSURE: 138 MMHG | RESPIRATION RATE: 16 BRPM | DIASTOLIC BLOOD PRESSURE: 72 MMHG | HEART RATE: 86 BPM | BODY MASS INDEX: 27.59 KG/M2 | WEIGHT: 146 LBS | OXYGEN SATURATION: 97 %

## 2024-06-27 LAB
BASOPHILS # BLD AUTO: 0.1 K/UL — SIGNIFICANT CHANGE UP (ref 0–0.2)
BASOPHILS NFR BLD AUTO: 0.7 % — SIGNIFICANT CHANGE UP (ref 0–2)
EOSINOPHIL # BLD AUTO: 0 K/UL — SIGNIFICANT CHANGE UP (ref 0–0.5)
EOSINOPHIL NFR BLD AUTO: 0.3 % — SIGNIFICANT CHANGE UP (ref 0–6)
HCT VFR BLD CALC: 38.4 % — SIGNIFICANT CHANGE UP (ref 34.5–45)
HGB BLD-MCNC: 12.5 G/DL — SIGNIFICANT CHANGE UP (ref 11.5–15.5)
LYMPHOCYTES # BLD AUTO: 0.6 K/UL — LOW (ref 1–3.3)
LYMPHOCYTES # BLD AUTO: 6.9 % — LOW (ref 13–44)
MCHC RBC-ENTMCNC: 31.1 PG — SIGNIFICANT CHANGE UP (ref 27–34)
MCHC RBC-ENTMCNC: 32.6 G/DL — SIGNIFICANT CHANGE UP (ref 32–36)
MCV RBC AUTO: 95.4 FL — SIGNIFICANT CHANGE UP (ref 80–100)
MONOCYTES # BLD AUTO: 0.5 K/UL — SIGNIFICANT CHANGE UP (ref 0–0.9)
MONOCYTES NFR BLD AUTO: 6.8 % — SIGNIFICANT CHANGE UP (ref 2–14)
NEUTROPHILS # BLD AUTO: 6.8 K/UL — SIGNIFICANT CHANGE UP (ref 1.8–7.4)
NEUTROPHILS NFR BLD AUTO: 85.3 % — HIGH (ref 43–77)
PLATELET # BLD AUTO: 206 K/UL — SIGNIFICANT CHANGE UP (ref 150–400)
RBC # BLD: 4.03 M/UL — SIGNIFICANT CHANGE UP (ref 3.8–5.2)
RBC # FLD: 12.9 % — SIGNIFICANT CHANGE UP (ref 10.3–14.5)
WBC # BLD: 8 K/UL — SIGNIFICANT CHANGE UP (ref 3.8–10.5)
WBC # FLD AUTO: 8 K/UL — SIGNIFICANT CHANGE UP (ref 3.8–10.5)

## 2024-06-27 PROCEDURE — 77014: CPT | Mod: 26

## 2024-06-27 NOTE — REVIEW OF SYSTEMS
[Nausea: Grade 0] : Nausea: Grade 0 [Vomiting: Grade 0] : Vomiting: Grade 0 [Edema Limbs: Grade 0] : Edema Limbs: Grade 0  [Fatigue: Grade 0] : Fatigue: Grade 0 [Localized Edema: Grade 0] : Localized Edema: Grade 0  [Neck Edema: Grade 0] : Neck Edema: Grade 0 [Blurred Vision: Grade 0] : Blurred Vision: Grade 0 [Dizziness: Grade 0] : Dizziness: Grade 0  [Headache: Grade 0] : Headache: Grade 0 [Alopecia: Grade 0] : Alopecia: Grade 0 [Nausea: Grade 1 - Loss of appetite without alteration in eating habits] : Nausea: Grade 1 - Loss of appetite without alteration in eating habits [Fatigue: Grade 1 - Fatigue relieved by rest] : Fatigue: Grade 1 - Fatigue relieved by rest

## 2024-06-27 NOTE — DISEASE MANAGEMENT
[Clinical] : TNM Stage: c [N/A] : Currently not applicable [FreeTextEntry4] : glioblastoma [TTNM] : - [NTNM] : - [MTNM] : - [de-identified] : [id number] [de-identified] : [id number] [de-identified] : glioblastoma

## 2024-06-28 PROCEDURE — 77387B: CUSTOM | Mod: 26

## 2024-07-01 ENCOUNTER — APPOINTMENT (OUTPATIENT)
Dept: NEUROSURGERY | Facility: CLINIC | Age: 69
End: 2024-07-01
Payer: MEDICARE

## 2024-07-01 VITALS
HEART RATE: 108 BPM | OXYGEN SATURATION: 97 % | DIASTOLIC BLOOD PRESSURE: 73 MMHG | SYSTOLIC BLOOD PRESSURE: 120 MMHG | HEIGHT: 61 IN | TEMPERATURE: 97.2 F

## 2024-07-01 PROBLEM — C71.9 GLIOBLASTOMA: Status: ACTIVE | Noted: 2024-05-16

## 2024-07-01 PROCEDURE — 77387B: CUSTOM | Mod: 26

## 2024-07-01 PROCEDURE — 99024 POSTOP FOLLOW-UP VISIT: CPT

## 2024-07-01 PROCEDURE — 77427 RADIATION TX MANAGEMENT X5: CPT

## 2024-07-02 PROCEDURE — 77387B: CUSTOM | Mod: 26

## 2024-07-03 ENCOUNTER — APPOINTMENT (OUTPATIENT)
Dept: HEMATOLOGY ONCOLOGY | Facility: CLINIC | Age: 69
End: 2024-07-03

## 2024-07-03 ENCOUNTER — RESULT REVIEW (OUTPATIENT)
Age: 69
End: 2024-07-03

## 2024-07-03 ENCOUNTER — NON-APPOINTMENT (OUTPATIENT)
Age: 69
End: 2024-07-03

## 2024-07-03 VITALS
OXYGEN SATURATION: 97 % | HEIGHT: 61 IN | DIASTOLIC BLOOD PRESSURE: 83 MMHG | BODY MASS INDEX: 27.56 KG/M2 | SYSTOLIC BLOOD PRESSURE: 135 MMHG | HEART RATE: 108 BPM | RESPIRATION RATE: 16 BRPM | WEIGHT: 146 LBS

## 2024-07-03 LAB
BASOPHILS # BLD AUTO: 0.1 K/UL — SIGNIFICANT CHANGE UP (ref 0–0.2)
BASOPHILS NFR BLD AUTO: 1 % — SIGNIFICANT CHANGE UP (ref 0–2)
EOSINOPHIL # BLD AUTO: 0.1 K/UL — SIGNIFICANT CHANGE UP (ref 0–0.5)
EOSINOPHIL NFR BLD AUTO: 1.2 % — SIGNIFICANT CHANGE UP (ref 0–6)
HCT VFR BLD CALC: 42.7 % — SIGNIFICANT CHANGE UP (ref 34.5–45)
HGB BLD-MCNC: 13.9 G/DL — SIGNIFICANT CHANGE UP (ref 11.5–15.5)
LYMPHOCYTES # BLD AUTO: 0.7 K/UL — LOW (ref 1–3.3)
LYMPHOCYTES # BLD AUTO: 8.9 % — LOW (ref 13–44)
MCHC RBC-ENTMCNC: 31.3 PG — SIGNIFICANT CHANGE UP (ref 27–34)
MCHC RBC-ENTMCNC: 32.6 G/DL — SIGNIFICANT CHANGE UP (ref 32–36)
MCV RBC AUTO: 95.9 FL — SIGNIFICANT CHANGE UP (ref 80–100)
MONOCYTES # BLD AUTO: 0.9 K/UL — SIGNIFICANT CHANGE UP (ref 0–0.9)
MONOCYTES NFR BLD AUTO: 10.9 % — SIGNIFICANT CHANGE UP (ref 2–14)
NEUTROPHILS # BLD AUTO: 6.3 K/UL — SIGNIFICANT CHANGE UP (ref 1.8–7.4)
NEUTROPHILS NFR BLD AUTO: 78 % — HIGH (ref 43–77)
PLATELET # BLD AUTO: 306 K/UL — SIGNIFICANT CHANGE UP (ref 150–400)
RBC # BLD: 4.45 M/UL — SIGNIFICANT CHANGE UP (ref 3.8–5.2)
RBC # FLD: 12.5 % — SIGNIFICANT CHANGE UP (ref 10.3–14.5)
WBC # BLD: 8.1 K/UL — SIGNIFICANT CHANGE UP (ref 3.8–10.5)
WBC # FLD AUTO: 8.1 K/UL — SIGNIFICANT CHANGE UP (ref 3.8–10.5)

## 2024-07-03 PROCEDURE — 77387B: CUSTOM | Mod: 26

## 2024-07-05 PROCEDURE — 77014: CPT | Mod: 26

## 2024-07-08 PROCEDURE — 77387B: CUSTOM | Mod: 26

## 2024-07-09 PROCEDURE — 77387B: CUSTOM | Mod: 26

## 2024-07-09 PROCEDURE — 77427 RADIATION TX MANAGEMENT X5: CPT

## 2024-07-10 PROCEDURE — 77387B: CUSTOM | Mod: 26

## 2024-07-11 ENCOUNTER — APPOINTMENT (OUTPATIENT)
Dept: NEUROLOGY | Facility: CLINIC | Age: 69
End: 2024-07-11
Payer: MEDICARE

## 2024-07-11 ENCOUNTER — APPOINTMENT (OUTPATIENT)
Dept: HEMATOLOGY ONCOLOGY | Facility: CLINIC | Age: 69
End: 2024-07-11

## 2024-07-11 ENCOUNTER — RESULT REVIEW (OUTPATIENT)
Age: 69
End: 2024-07-11

## 2024-07-11 ENCOUNTER — NON-APPOINTMENT (OUTPATIENT)
Age: 69
End: 2024-07-11

## 2024-07-11 VITALS
TEMPERATURE: 97.3 F | DIASTOLIC BLOOD PRESSURE: 79 MMHG | SYSTOLIC BLOOD PRESSURE: 133 MMHG | HEART RATE: 89 BPM | WEIGHT: 144.5 LBS | BODY MASS INDEX: 27.28 KG/M2 | HEIGHT: 61 IN | OXYGEN SATURATION: 96 %

## 2024-07-11 VITALS
BODY MASS INDEX: 27.19 KG/M2 | OXYGEN SATURATION: 98 % | HEART RATE: 76 BPM | RESPIRATION RATE: 16 BRPM | HEIGHT: 61 IN | DIASTOLIC BLOOD PRESSURE: 70 MMHG | SYSTOLIC BLOOD PRESSURE: 138 MMHG | WEIGHT: 144 LBS

## 2024-07-11 DIAGNOSIS — C71.9 MALIGNANT NEOPLASM OF BRAIN, UNSPECIFIED: ICD-10-CM

## 2024-07-11 DIAGNOSIS — G81.94 HEMIPLEGIA, UNSPECIFIED AFFECTING LEFT NONDOMINANT SIDE: ICD-10-CM

## 2024-07-11 LAB
BASOPHILS # BLD AUTO: 0 K/UL — SIGNIFICANT CHANGE UP (ref 0–0.2)
BASOPHILS NFR BLD AUTO: 0.4 % — SIGNIFICANT CHANGE UP (ref 0–2)
EOSINOPHIL # BLD AUTO: 0 K/UL — SIGNIFICANT CHANGE UP (ref 0–0.5)
EOSINOPHIL NFR BLD AUTO: 0.2 % — SIGNIFICANT CHANGE UP (ref 0–6)
HCT VFR BLD CALC: 38.5 % — SIGNIFICANT CHANGE UP (ref 34.5–45)
HGB BLD-MCNC: 12.6 G/DL — SIGNIFICANT CHANGE UP (ref 11.5–15.5)
LYMPHOCYTES # BLD AUTO: 0.5 K/UL — LOW (ref 1–3.3)
LYMPHOCYTES # BLD AUTO: 5.6 % — LOW (ref 13–44)
MCHC RBC-ENTMCNC: 31 PG — SIGNIFICANT CHANGE UP (ref 27–34)
MCHC RBC-ENTMCNC: 32.8 G/DL — SIGNIFICANT CHANGE UP (ref 32–36)
MCV RBC AUTO: 94.5 FL — SIGNIFICANT CHANGE UP (ref 80–100)
MONOCYTES # BLD AUTO: 0.6 K/UL — SIGNIFICANT CHANGE UP (ref 0–0.9)
MONOCYTES NFR BLD AUTO: 6.3 % — SIGNIFICANT CHANGE UP (ref 2–14)
NEUTROPHILS # BLD AUTO: 7.8 K/UL — HIGH (ref 1.8–7.4)
NEUTROPHILS NFR BLD AUTO: 87.4 % — HIGH (ref 43–77)
PLATELET # BLD AUTO: 415 K/UL — HIGH (ref 150–400)
RBC # BLD: 4.07 M/UL — SIGNIFICANT CHANGE UP (ref 3.8–5.2)
RBC # FLD: 12.5 % — SIGNIFICANT CHANGE UP (ref 10.3–14.5)
WBC # BLD: 8.9 K/UL — SIGNIFICANT CHANGE UP (ref 3.8–10.5)
WBC # FLD AUTO: 8.9 K/UL — SIGNIFICANT CHANGE UP (ref 3.8–10.5)

## 2024-07-11 PROCEDURE — 77014: CPT | Mod: 26

## 2024-07-11 PROCEDURE — 99214 OFFICE O/P EST MOD 30 MIN: CPT

## 2024-07-12 PROCEDURE — 77387B: CUSTOM | Mod: 26

## 2024-07-15 PROCEDURE — 77387B: CUSTOM | Mod: 26

## 2024-07-16 PROCEDURE — 77427 RADIATION TX MANAGEMENT X5: CPT

## 2024-07-16 PROCEDURE — 77387B: CUSTOM | Mod: 26

## 2024-07-17 PROCEDURE — 77387B: CUSTOM | Mod: 26

## 2024-07-18 ENCOUNTER — NON-APPOINTMENT (OUTPATIENT)
Age: 69
End: 2024-07-18

## 2024-07-18 ENCOUNTER — RESULT REVIEW (OUTPATIENT)
Age: 69
End: 2024-07-18

## 2024-07-18 ENCOUNTER — APPOINTMENT (OUTPATIENT)
Dept: HEMATOLOGY ONCOLOGY | Facility: CLINIC | Age: 69
End: 2024-07-18

## 2024-07-18 VITALS
HEART RATE: 72 BPM | HEIGHT: 61 IN | OXYGEN SATURATION: 96 % | WEIGHT: 145 LBS | RESPIRATION RATE: 16 BRPM | SYSTOLIC BLOOD PRESSURE: 144 MMHG | BODY MASS INDEX: 27.38 KG/M2 | DIASTOLIC BLOOD PRESSURE: 85 MMHG

## 2024-07-18 LAB
BASOPHILS # BLD AUTO: 0 K/UL — SIGNIFICANT CHANGE UP (ref 0–0.2)
BASOPHILS NFR BLD AUTO: 0.4 % — SIGNIFICANT CHANGE UP (ref 0–2)
EOSINOPHIL # BLD AUTO: 0 K/UL — SIGNIFICANT CHANGE UP (ref 0–0.5)
EOSINOPHIL NFR BLD AUTO: 0 % — SIGNIFICANT CHANGE UP (ref 0–6)
HCT VFR BLD CALC: 38.7 % — SIGNIFICANT CHANGE UP (ref 34.5–45)
HGB BLD-MCNC: 12.8 G/DL — SIGNIFICANT CHANGE UP (ref 11.5–15.5)
LYMPHOCYTES # BLD AUTO: 0.3 K/UL — LOW (ref 1–3.3)
LYMPHOCYTES # BLD AUTO: 3.7 % — LOW (ref 13–44)
MCHC RBC-ENTMCNC: 31.7 PG — SIGNIFICANT CHANGE UP (ref 27–34)
MCHC RBC-ENTMCNC: 33.2 G/DL — SIGNIFICANT CHANGE UP (ref 32–36)
MCV RBC AUTO: 95.7 FL — SIGNIFICANT CHANGE UP (ref 80–100)
MONOCYTES # BLD AUTO: 0.3 K/UL — SIGNIFICANT CHANGE UP (ref 0–0.9)
MONOCYTES NFR BLD AUTO: 3.8 % — SIGNIFICANT CHANGE UP (ref 2–14)
NEUTROPHILS # BLD AUTO: 7.7 K/UL — HIGH (ref 1.8–7.4)
NEUTROPHILS NFR BLD AUTO: 92.1 % — HIGH (ref 43–77)
PLATELET # BLD AUTO: 241 K/UL — SIGNIFICANT CHANGE UP (ref 150–400)
RBC # BLD: 4.04 M/UL — SIGNIFICANT CHANGE UP (ref 3.8–5.2)
RBC # FLD: 12.9 % — SIGNIFICANT CHANGE UP (ref 10.3–14.5)
WBC # BLD: 8.4 K/UL — SIGNIFICANT CHANGE UP (ref 3.8–10.5)
WBC # FLD AUTO: 8.4 K/UL — SIGNIFICANT CHANGE UP (ref 3.8–10.5)

## 2024-07-18 PROCEDURE — 77014: CPT | Mod: 26

## 2024-07-19 PROCEDURE — 77387B: CUSTOM | Mod: 26

## 2024-07-19 NOTE — REVIEW OF SYSTEMS
[Fatigue: Grade 1 - Fatigue relieved by rest] : Fatigue: Grade 1 - Fatigue relieved by rest [Pruritus: Grade 1 - Mild or localized; topical intervention indicated] : Pruritus: Grade 1 - Mild or localized; topical intervention indicated [Skin Hyperpigmentation: Grade 0] : Skin Hyperpigmentation: Grade 0 [Dermatitis Radiation: Grade 0] : Dermatitis Radiation: Grade 0

## 2024-07-22 PROCEDURE — 77387B: CUSTOM | Mod: 26

## 2024-07-23 PROCEDURE — 77387B: CUSTOM | Mod: 26

## 2024-07-23 PROCEDURE — 77427 RADIATION TX MANAGEMENT X5: CPT

## 2024-07-24 PROCEDURE — 77387B: CUSTOM | Mod: 26

## 2024-07-25 ENCOUNTER — NON-APPOINTMENT (OUTPATIENT)
Age: 69
End: 2024-07-25

## 2024-07-25 ENCOUNTER — APPOINTMENT (OUTPATIENT)
Dept: HEMATOLOGY ONCOLOGY | Facility: CLINIC | Age: 69
End: 2024-07-25

## 2024-07-25 ENCOUNTER — RESULT REVIEW (OUTPATIENT)
Age: 69
End: 2024-07-25

## 2024-07-25 ENCOUNTER — APPOINTMENT (OUTPATIENT)
Dept: NEUROLOGY | Facility: CLINIC | Age: 69
End: 2024-07-25
Payer: MEDICARE

## 2024-07-25 VITALS
TEMPERATURE: 97.9 F | HEIGHT: 61 IN | WEIGHT: 145.56 LBS | DIASTOLIC BLOOD PRESSURE: 73 MMHG | BODY MASS INDEX: 27.48 KG/M2 | OXYGEN SATURATION: 95 % | HEART RATE: 73 BPM | SYSTOLIC BLOOD PRESSURE: 132 MMHG

## 2024-07-25 VITALS
DIASTOLIC BLOOD PRESSURE: 78 MMHG | SYSTOLIC BLOOD PRESSURE: 151 MMHG | RESPIRATION RATE: 16 BRPM | BODY MASS INDEX: 27.56 KG/M2 | WEIGHT: 146 LBS | HEIGHT: 61 IN | HEART RATE: 84 BPM | OXYGEN SATURATION: 97 %

## 2024-07-25 DIAGNOSIS — C71.9 MALIGNANT NEOPLASM OF BRAIN, UNSPECIFIED: ICD-10-CM

## 2024-07-25 DIAGNOSIS — D69.59 OTHER SECONDARY THROMBOCYTOPENIA: ICD-10-CM

## 2024-07-25 DIAGNOSIS — T45.1X5A OTHER SECONDARY THROMBOCYTOPENIA: ICD-10-CM

## 2024-07-25 LAB
BASOPHILS # BLD AUTO: 0 K/UL — SIGNIFICANT CHANGE UP (ref 0–0.2)
BASOPHILS NFR BLD AUTO: 0.6 % — SIGNIFICANT CHANGE UP (ref 0–2)
EOSINOPHIL # BLD AUTO: 0 K/UL — SIGNIFICANT CHANGE UP (ref 0–0.5)
EOSINOPHIL NFR BLD AUTO: 0.1 % — SIGNIFICANT CHANGE UP (ref 0–6)
HCT VFR BLD CALC: 39.7 % — SIGNIFICANT CHANGE UP (ref 34.5–45)
HGB BLD-MCNC: 13 G/DL — SIGNIFICANT CHANGE UP (ref 11.5–15.5)
LYMPHOCYTES # BLD AUTO: 0.3 K/UL — LOW (ref 1–3.3)
LYMPHOCYTES # BLD AUTO: 3.4 % — LOW (ref 13–44)
MCHC RBC-ENTMCNC: 31.4 PG — SIGNIFICANT CHANGE UP (ref 27–34)
MCHC RBC-ENTMCNC: 32.6 G/DL — SIGNIFICANT CHANGE UP (ref 32–36)
MCV RBC AUTO: 96.3 FL — SIGNIFICANT CHANGE UP (ref 80–100)
MONOCYTES # BLD AUTO: 0.2 K/UL — SIGNIFICANT CHANGE UP (ref 0–0.9)
MONOCYTES NFR BLD AUTO: 2.9 % — SIGNIFICANT CHANGE UP (ref 2–14)
NEUTROPHILS # BLD AUTO: 7.3 K/UL — SIGNIFICANT CHANGE UP (ref 1.8–7.4)
NEUTROPHILS NFR BLD AUTO: 93 % — HIGH (ref 43–77)
PLATELET # BLD AUTO: 124 K/UL — LOW (ref 150–400)
RBC # BLD: 4.12 M/UL — SIGNIFICANT CHANGE UP (ref 3.8–5.2)
RBC # FLD: 13.3 % — SIGNIFICANT CHANGE UP (ref 10.3–14.5)
WBC # BLD: 7.8 K/UL — SIGNIFICANT CHANGE UP (ref 3.8–10.5)
WBC # FLD AUTO: 7.8 K/UL — SIGNIFICANT CHANGE UP (ref 3.8–10.5)

## 2024-07-25 PROCEDURE — 99215 OFFICE O/P EST HI 40 MIN: CPT

## 2024-07-25 PROCEDURE — 77014: CPT | Mod: 26

## 2024-07-25 NOTE — DISEASE MANAGEMENT
[Clinical] : TNM Stage: c [N/A] : Currently not applicable [FreeTextEntry4] : glioblastoma [TTNM] : - [NTNM] : - [MTNM] : - [de-identified] : 1203 [de-identified] : [id number] [de-identified] : brain

## 2024-07-25 NOTE — PHYSICAL EXAM
[Outer Ear] : the ears and nose were normal in appearance [Extraocular Movements] : extraocular movements were intact [Heart Rate And Rhythm] : heart rate and rhythm were normal [Heart Sounds] : normal S1 and S2 [Normal] : oriented to person, place and time, the affect was normal, the mood was normal and not anxious [de-identified] : ambulates with walker, improved coordination

## 2024-07-25 NOTE — DISEASE MANAGEMENT
[Clinical] : TNM Stage: c [N/A] : Currently not applicable [FreeTextEntry4] : glioblastoma [TTNM] : - [NTNM] : - [MTNM] : - [de-identified] : 3849 [de-identified] : [id number] [de-identified] : brain

## 2024-07-25 NOTE — DISCUSSION/SUMMARY
[FreeTextEntry1] : New GBM, MGMT methylated s/p resection..Ongoing chemoRT.  Low plt, mild, due to chemo.   TUMOR:   Advised to hold chemo as RT nearly done.   MRI 4w coordinated. Plan for 6c adjuvant TMZ, will discuss Optune  BRAIN EDEMA: dex to 1  SUPPORTIVE: off keppra  HEME: weekly CBC coordinated  HEMIPARESIS: improved on my exam, PT/OT  RTC2w

## 2024-07-25 NOTE — DATA REVIEWED
[de-identified] : Pre and post operative MRI brain form 4/24 and 5/24 show resection of right frontal enhancing tumor, with area of DWI medial to resection cavity

## 2024-07-25 NOTE — VITALS
[Maximal Pain Intensity: 1/10] : 1/10 [69: Cares for self; unalbe to carry on normal activity or do active work.] : 69: Cares for self; unable to carry on normal activity or do active work. [Least Pain Intensity: 0/10] : 0/10

## 2024-07-25 NOTE — HISTORY OF PRESENT ILLNESS
[FreeTextEntry1] : NEURO-ONCOLOGY  This 69 year old right handed woman is seen in follow up regarding glioblastoma  She presented with 3-4w of progressive left hand clumsiness and dropping things.  Her gait was a bit off as well.  An enhancing right frontal lesion was resected by Dr. Guaman on 5/3/24, revealing glioblastoma IDH wt, with small cell features.  Caris with MGMT methylated, mutations in CDKN2A/B deletion, TERT promoter, PIK3CA, NF1, MTAP, and TMB 4. No history of seizures.     INTERVAL HISTORY:  Improved gait, walking without cane. RT done monday, but plt today 124.    PMH:  Afib.  PSH: none SHX:  here with  [facility]ello.  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

## 2024-07-25 NOTE — VITALS
[Maximal Pain Intensity: 1/10] : 1/10 [69: Cares for self; unalbe to carry on normal activity or do active work.] : 69: Cares for self; unable to carry on normal activity or do active work. [Least Pain Intensity: 0/10] : 0/10 No

## 2024-07-25 NOTE — PHYSICAL EXAM
[Outer Ear] : the ears and nose were normal in appearance [Extraocular Movements] : extraocular movements were intact [Heart Rate And Rhythm] : heart rate and rhythm were normal [Heart Sounds] : normal S1 and S2 [Normal] : oriented to person, place and time, the affect was normal, the mood was normal and not anxious [de-identified] : ambulates with walker, improved coordination

## 2024-07-26 PROCEDURE — 77387B: CUSTOM | Mod: 26

## 2024-07-29 PROCEDURE — 77387B: CUSTOM | Mod: 26

## 2024-07-31 ENCOUNTER — RESULT REVIEW (OUTPATIENT)
Age: 69
End: 2024-07-31

## 2024-07-31 ENCOUNTER — APPOINTMENT (OUTPATIENT)
Dept: HEMATOLOGY ONCOLOGY | Facility: CLINIC | Age: 69
End: 2024-07-31

## 2024-07-31 LAB
BASOPHILS # BLD AUTO: 0 K/UL — SIGNIFICANT CHANGE UP (ref 0–0.2)
BASOPHILS NFR BLD AUTO: 0.7 % — SIGNIFICANT CHANGE UP (ref 0–2)
EOSINOPHIL # BLD AUTO: 0 K/UL — SIGNIFICANT CHANGE UP (ref 0–0.5)
EOSINOPHIL NFR BLD AUTO: 0.2 % — SIGNIFICANT CHANGE UP (ref 0–6)
HCT VFR BLD CALC: 39.6 % — SIGNIFICANT CHANGE UP (ref 34.5–45)
HGB BLD-MCNC: 12.9 G/DL — SIGNIFICANT CHANGE UP (ref 11.5–15.5)
LYMPHOCYTES # BLD AUTO: 0.3 K/UL — LOW (ref 1–3.3)
LYMPHOCYTES # BLD AUTO: 4.7 % — LOW (ref 13–44)
MCHC RBC-ENTMCNC: 31.4 PG — SIGNIFICANT CHANGE UP (ref 27–34)
MCHC RBC-ENTMCNC: 32.6 G/DL — SIGNIFICANT CHANGE UP (ref 32–36)
MCV RBC AUTO: 96.3 FL — SIGNIFICANT CHANGE UP (ref 80–100)
MONOCYTES # BLD AUTO: 0.5 K/UL — SIGNIFICANT CHANGE UP (ref 0–0.9)
MONOCYTES NFR BLD AUTO: 7.1 % — SIGNIFICANT CHANGE UP (ref 2–14)
NEUTROPHILS # BLD AUTO: 5.6 K/UL — SIGNIFICANT CHANGE UP (ref 1.8–7.4)
NEUTROPHILS NFR BLD AUTO: 87.2 % — HIGH (ref 43–77)
PLATELET # BLD AUTO: 205 K/UL — SIGNIFICANT CHANGE UP (ref 150–400)
RBC # BLD: 4.11 M/UL — SIGNIFICANT CHANGE UP (ref 3.8–5.2)
RBC # FLD: 13.7 % — SIGNIFICANT CHANGE UP (ref 10.3–14.5)
WBC # BLD: 6.4 K/UL — SIGNIFICANT CHANGE UP (ref 3.8–10.5)
WBC # FLD AUTO: 6.4 K/UL — SIGNIFICANT CHANGE UP (ref 3.8–10.5)

## 2024-08-06 ENCOUNTER — INPATIENT (INPATIENT)
Facility: HOSPITAL | Age: 69
LOS: 1 days | Discharge: HOME HEALTH | DRG: 100 | End: 2024-08-08
Attending: NEUROLOGICAL SURGERY | Admitting: NEUROLOGICAL SURGERY
Payer: COMMERCIAL

## 2024-08-06 VITALS
DIASTOLIC BLOOD PRESSURE: 82 MMHG | HEIGHT: 62 IN | HEART RATE: 105 BPM | TEMPERATURE: 98 F | RESPIRATION RATE: 18 BRPM | WEIGHT: 145.06 LBS | OXYGEN SATURATION: 94 % | SYSTOLIC BLOOD PRESSURE: 146 MMHG

## 2024-08-06 DIAGNOSIS — C71.9 MALIGNANT NEOPLASM OF BRAIN, UNSPECIFIED: ICD-10-CM

## 2024-08-06 DIAGNOSIS — Z87.898 PERSONAL HISTORY OF OTHER SPECIFIED CONDITIONS: Chronic | ICD-10-CM

## 2024-08-06 DIAGNOSIS — Z98.890 OTHER SPECIFIED POSTPROCEDURAL STATES: Chronic | ICD-10-CM

## 2024-08-06 LAB
ALBUMIN SERPL ELPH-MCNC: 4.1 G/DL — SIGNIFICANT CHANGE UP (ref 3.3–5.2)
ALP SERPL-CCNC: 64 U/L — SIGNIFICANT CHANGE UP (ref 40–120)
ALT FLD-CCNC: 22 U/L — SIGNIFICANT CHANGE UP
AMPHET UR-MCNC: NEGATIVE — SIGNIFICANT CHANGE UP
ANION GAP SERPL CALC-SCNC: 13 MMOL/L — SIGNIFICANT CHANGE UP (ref 5–17)
APPEARANCE UR: CLEAR — SIGNIFICANT CHANGE UP
APTT BLD: 30.6 SEC — SIGNIFICANT CHANGE UP (ref 24.5–35.6)
AST SERPL-CCNC: 16 U/L — SIGNIFICANT CHANGE UP
BARBITURATES UR SCN-MCNC: NEGATIVE — SIGNIFICANT CHANGE UP
BASOPHILS # BLD AUTO: 0.02 K/UL — SIGNIFICANT CHANGE UP (ref 0–0.2)
BASOPHILS NFR BLD AUTO: 0.3 % — SIGNIFICANT CHANGE UP (ref 0–2)
BENZODIAZ UR-MCNC: POSITIVE
BILIRUB SERPL-MCNC: 0.3 MG/DL — LOW (ref 0.4–2)
BILIRUB UR-MCNC: NEGATIVE — SIGNIFICANT CHANGE UP
BUN SERPL-MCNC: 24.4 MG/DL — HIGH (ref 8–20)
CALCIUM SERPL-MCNC: 9.3 MG/DL — SIGNIFICANT CHANGE UP (ref 8.4–10.5)
CHLORIDE SERPL-SCNC: 106 MMOL/L — SIGNIFICANT CHANGE UP (ref 96–108)
CO2 SERPL-SCNC: 26 MMOL/L — SIGNIFICANT CHANGE UP (ref 22–29)
COCAINE METAB.OTHER UR-MCNC: NEGATIVE — SIGNIFICANT CHANGE UP
COLOR SPEC: YELLOW — SIGNIFICANT CHANGE UP
CREAT SERPL-MCNC: 0.73 MG/DL — SIGNIFICANT CHANGE UP (ref 0.5–1.3)
DIFF PNL FLD: NEGATIVE — SIGNIFICANT CHANGE UP
EGFR: 88 ML/MIN/1.73M2 — SIGNIFICANT CHANGE UP
EOSINOPHIL # BLD AUTO: 0.01 K/UL — SIGNIFICANT CHANGE UP (ref 0–0.5)
EOSINOPHIL NFR BLD AUTO: 0.2 % — SIGNIFICANT CHANGE UP (ref 0–6)
FENTANYL UR QL SCN: NEGATIVE — SIGNIFICANT CHANGE UP
GLUCOSE SERPL-MCNC: 118 MG/DL — HIGH (ref 70–99)
GLUCOSE UR QL: NEGATIVE MG/DL — SIGNIFICANT CHANGE UP
HCT VFR BLD CALC: 37.7 % — SIGNIFICANT CHANGE UP (ref 34.5–45)
HGB BLD-MCNC: 12.5 G/DL — SIGNIFICANT CHANGE UP (ref 11.5–15.5)
IMM GRANULOCYTES NFR BLD AUTO: 1.3 % — HIGH (ref 0–0.9)
INR BLD: 0.85 RATIO — SIGNIFICANT CHANGE UP (ref 0.85–1.18)
KETONES UR-MCNC: NEGATIVE MG/DL — SIGNIFICANT CHANGE UP
LEUKOCYTE ESTERASE UR-ACNC: NEGATIVE — SIGNIFICANT CHANGE UP
LYMPHOCYTES # BLD AUTO: 0.43 K/UL — LOW (ref 1–3.3)
LYMPHOCYTES # BLD AUTO: 6.8 % — LOW (ref 13–44)
MCHC RBC-ENTMCNC: 31.2 PG — SIGNIFICANT CHANGE UP (ref 27–34)
MCHC RBC-ENTMCNC: 33.2 GM/DL — SIGNIFICANT CHANGE UP (ref 32–36)
MCV RBC AUTO: 94 FL — SIGNIFICANT CHANGE UP (ref 80–100)
METHADONE UR-MCNC: NEGATIVE — SIGNIFICANT CHANGE UP
MONOCYTES # BLD AUTO: 0.5 K/UL — SIGNIFICANT CHANGE UP (ref 0–0.9)
MONOCYTES NFR BLD AUTO: 7.9 % — SIGNIFICANT CHANGE UP (ref 2–14)
NEUTROPHILS # BLD AUTO: 5.26 K/UL — SIGNIFICANT CHANGE UP (ref 1.8–7.4)
NEUTROPHILS NFR BLD AUTO: 83.5 % — HIGH (ref 43–77)
NITRITE UR-MCNC: NEGATIVE — SIGNIFICANT CHANGE UP
OPIATES UR-MCNC: NEGATIVE — SIGNIFICANT CHANGE UP
PCP SPEC-MCNC: SIGNIFICANT CHANGE UP
PCP UR-MCNC: NEGATIVE — SIGNIFICANT CHANGE UP
PH UR: 8 — SIGNIFICANT CHANGE UP (ref 5–8)
PLATELET # BLD AUTO: 237 K/UL — SIGNIFICANT CHANGE UP (ref 150–400)
POTASSIUM SERPL-MCNC: 3.8 MMOL/L — SIGNIFICANT CHANGE UP (ref 3.5–5.3)
POTASSIUM SERPL-SCNC: 3.8 MMOL/L — SIGNIFICANT CHANGE UP (ref 3.5–5.3)
PROT SERPL-MCNC: 6.4 G/DL — LOW (ref 6.6–8.7)
PROT UR-MCNC: NEGATIVE MG/DL — SIGNIFICANT CHANGE UP
PROTHROM AB SERPL-ACNC: 9.5 SEC — SIGNIFICANT CHANGE UP (ref 9.5–13)
RBC # BLD: 4.01 M/UL — SIGNIFICANT CHANGE UP (ref 3.8–5.2)
RBC # FLD: 14.1 % — SIGNIFICANT CHANGE UP (ref 10.3–14.5)
SODIUM SERPL-SCNC: 145 MMOL/L — SIGNIFICANT CHANGE UP (ref 135–145)
SP GR SPEC: >1.03 — HIGH (ref 1–1.03)
THC UR QL: NEGATIVE — SIGNIFICANT CHANGE UP
TROPONIN T, HIGH SENSITIVITY RESULT: <6 NG/L — SIGNIFICANT CHANGE UP (ref 0–51)
UROBILINOGEN FLD QL: 1 MG/DL — SIGNIFICANT CHANGE UP (ref 0.2–1)
WBC # BLD: 6.3 K/UL — SIGNIFICANT CHANGE UP (ref 3.8–10.5)
WBC # FLD AUTO: 6.3 K/UL — SIGNIFICANT CHANGE UP (ref 3.8–10.5)

## 2024-08-06 PROCEDURE — 93010 ELECTROCARDIOGRAM REPORT: CPT

## 2024-08-06 PROCEDURE — 0042T: CPT | Mod: MC

## 2024-08-06 PROCEDURE — 70450 CT HEAD/BRAIN W/O DYE: CPT | Mod: 26,MC,XU

## 2024-08-06 PROCEDURE — 99291 CRITICAL CARE FIRST HOUR: CPT

## 2024-08-06 PROCEDURE — 70498 CT ANGIOGRAPHY NECK: CPT | Mod: 26,MC

## 2024-08-06 PROCEDURE — 70496 CT ANGIOGRAPHY HEAD: CPT | Mod: 26,MC

## 2024-08-06 RX ORDER — FENOFIBRATE 30 MG/1
1 CAPSULE ORAL
Refills: 0 | DISCHARGE

## 2024-08-06 RX ORDER — LEVETIRACETAM 1000 MG/1
1000 TABLET, FILM COATED ORAL ONCE
Refills: 0 | Status: DISCONTINUED | OUTPATIENT
Start: 2024-08-06 | End: 2024-08-06

## 2024-08-06 RX ORDER — LEVETIRACETAM 1000 MG/1
500 TABLET, FILM COATED ORAL EVERY 12 HOURS
Refills: 0 | Status: DISCONTINUED | OUTPATIENT
Start: 2024-08-06 | End: 2024-08-08

## 2024-08-06 RX ORDER — LEVETIRACETAM 1000 MG/1
100 TABLET, FILM COATED ORAL ONCE
Refills: 0 | Status: DISCONTINUED | OUTPATIENT
Start: 2024-08-06 | End: 2024-08-06

## 2024-08-06 RX ORDER — ATORVASTATIN CALCIUM 40 MG/1
20 TABLET, FILM COATED ORAL AT BEDTIME
Refills: 0 | Status: DISCONTINUED | OUTPATIENT
Start: 2024-08-06 | End: 2024-08-08

## 2024-08-06 RX ORDER — BACTERIOSTATIC SODIUM CHLORIDE 0.9 %
1000 VIAL (ML) INJECTION
Refills: 0 | Status: DISCONTINUED | OUTPATIENT
Start: 2024-08-06 | End: 2024-08-08

## 2024-08-06 RX ORDER — DEXAMETHASONE 1.5 MG/1
10 TABLET ORAL ONCE
Refills: 0 | Status: COMPLETED | OUTPATIENT
Start: 2024-08-06 | End: 2024-08-06

## 2024-08-06 RX ORDER — PANTOPRAZOLE SODIUM 20 MG/1
40 TABLET, DELAYED RELEASE ORAL DAILY
Refills: 0 | Status: DISCONTINUED | OUTPATIENT
Start: 2024-08-06 | End: 2024-08-08

## 2024-08-06 RX ORDER — DEXAMETHASONE 1.5 MG/1
4 TABLET ORAL EVERY 6 HOURS
Refills: 0 | Status: DISCONTINUED | OUTPATIENT
Start: 2024-08-06 | End: 2024-08-08

## 2024-08-06 RX ORDER — LORATADINE 10 MG
17 TABLET,DISINTEGRATING ORAL DAILY
Refills: 0 | Status: DISCONTINUED | OUTPATIENT
Start: 2024-08-06 | End: 2024-08-08

## 2024-08-06 RX ORDER — LEVETIRACETAM 1000 MG/1
1000 TABLET, FILM COATED ORAL ONCE
Refills: 0 | Status: COMPLETED | OUTPATIENT
Start: 2024-08-06 | End: 2024-08-06

## 2024-08-06 RX ORDER — DILTIAZEM HCL 90 MG
180 TABLET ORAL DAILY
Refills: 0 | Status: DISCONTINUED | OUTPATIENT
Start: 2024-08-06 | End: 2024-08-08

## 2024-08-06 RX ORDER — ACETAMINOPHEN 500 MG
650 TABLET ORAL EVERY 6 HOURS
Refills: 0 | Status: DISCONTINUED | OUTPATIENT
Start: 2024-08-06 | End: 2024-08-08

## 2024-08-06 RX ORDER — SENNOSIDES 8.6 MG/1
2 TABLET ORAL AT BEDTIME
Refills: 0 | Status: DISCONTINUED | OUTPATIENT
Start: 2024-08-06 | End: 2024-08-08

## 2024-08-06 RX ORDER — DEXAMETHASONE 1.5 MG/1
10 TABLET ORAL ONCE
Refills: 0 | Status: DISCONTINUED | OUTPATIENT
Start: 2024-08-06 | End: 2024-08-06

## 2024-08-06 RX ORDER — LORAZEPAM 1 MG/1
2 TABLET ORAL ONCE
Refills: 0 | Status: DISCONTINUED | OUTPATIENT
Start: 2024-08-06 | End: 2024-08-06

## 2024-08-06 RX ADMIN — Medication 60 MILLILITER(S): at 20:06

## 2024-08-06 RX ADMIN — ATORVASTATIN CALCIUM 20 MILLIGRAM(S): 40 TABLET, FILM COATED ORAL at 22:21

## 2024-08-06 RX ADMIN — SENNOSIDES 2 TABLET(S): 8.6 TABLET ORAL at 22:21

## 2024-08-06 RX ADMIN — DEXAMETHASONE 102 MILLIGRAM(S): 1.5 TABLET ORAL at 20:00

## 2024-08-06 RX ADMIN — LORAZEPAM 2 MILLIGRAM(S): 1 TABLET ORAL at 15:54

## 2024-08-06 RX ADMIN — LEVETIRACETAM 400 MILLIGRAM(S): 1000 TABLET, FILM COATED ORAL at 16:32

## 2024-08-06 NOTE — ED PROVIDER NOTE - CLINICAL SUMMARY MEDICAL DECISION MAKING FREE TEXT BOX
69 year-old female with PMHx Afib on ASA, HTN, HLD, brain mass s/p resection and radiation therapy sent by PMDs office for LUE weakness. onset was 1 hour PTA. patient also noted to have rhythmic motion of the LUE. states her keppra was stopped recently. she has associated headache. denies vision changes, speech changes, difficulty walking. denies any recent fever, cough, congestion, CP, SOB, abd pain, N/V/D, urinary symptoms.    danny bryant  stopped keppra 69 year-old female with PMHx Afib on ASA, HTN, HLD, brain mass s/p resection and radiation therapy sent by PMDs office for LUE weakness. onset was 1 hour PTA. patient also noted to have rhythmic motion of the LUE. d/w dr bryant, patients keppra was recently stopped. patient with LUE weakness and seizure movement on arrival. broke on its own but subsequent focal seizure requiring ativan. patient otherwise neuro intact. chart review shows LUE deficits are residual. CT scan showing increasing vasogenic edema, likely post op hemorrhage, possible tumor recurrence. d/w neurology and neurosurgery. patient to be restarted on keppra. decadron given. will admit for neuro checks and mri.

## 2024-08-06 NOTE — H&P ADULT - HISTORY OF PRESENT ILLNESS
69 year-old female with PMHx Afib on ASA, HTN, HLD, brain mass s/p crani and resection by Dr. Guaman on 5/3/24 and radiation therapy sent by PMDs office for LUE weakness. onset was 1 hour PTA. patient also noted to have rhythmic motion of the LUE. states her keppra was stopped one week ago. She has associated headache. Denies vision changes, speech changes, difficulty walking, recent fever, cough, congestion, CP, SOB, abd pain, N/V/D, urinary symptoms.       69 year-old female with PMHx Afib on cardizem (no AP/AC), HTN, HLD, brain mass s/p crani and resection by Dr. Guaman on 5/3/24 and radiation therapy sent by PCP office for LUE jerking lasting for 15 minutes and drooling onset was 1 hour PTA. Patient also noted to have rhythmic motion of the LUE at . States her keppra was stopped one week ago. Denies vision changes, speech changes, difficulty walking, recent fever, cough, congestion, CP, SOB, abd pain, N/V/D, urinary symptoms. Does not endorse any complaints at this time. Code stroke was called.

## 2024-08-06 NOTE — ED ADULT NURSE NOTE - OBJECTIVE STATEMENT
Assumed care of pt at 1603 after pt received in trauma 2 after CT. c/o code stroke. Pt stated she was sitting at her neurologist appointment when she developed shaking of the left hand and feeling like her right side was spinning. Episode self resolved, and a second episode occurred during CT, which broke with ativan. PMH afib on ASA, HTN, HTN, HLD, brain mass s/p resection and radiation on keppra which pt stopped 2 weeks ago as per her oncologist. Pt denies SOB, N/V/D, CP, palpitations, fevers, chills, vision changes. Pt with left arm weakness at baseline. Pt Aox4, speaking coherently, respirations even and unlabored on RA, skin warm and dry, NIH 2.

## 2024-08-06 NOTE — ED PROVIDER NOTE - ATTENDING CONTRIBUTION TO CARE
I, Venice Jones DO, have personally provided 60 minutes of critical care time exclusive of time spent on separately billable procedures. Time includes review of laboratory data, radiology results, discussion with consultants, and monitoring for potential decompensation. Interventions were performed as documented above.     I personally saw the patient with the resident, and completed the key components of the history and physical exam. I then discussed the management plan with the resident.    69-year-old female with past medical history of A-fib on aspirin, hypertension, hyperlipidemia, glioblastoma s/p resection, chemo and radiation presents for sudden onset rhythmic left upper extremity movement and headache, which she describes as that side of her body "spinning".  She recently stopped Keppra as recommended by her neuro oncologist.    Agree with exam as documented.    Code stroke called by triage nurse, however symptoms more consistent with partial seizure, as patient retains consciousness throughout the whole thing, rhythmic motion of left upper extremity stopped on its own, CT shows recurrence of mass, patient loaded with Keppra, neurosurgery evaluated and accepts patient to their service, can go to stepdown.

## 2024-08-06 NOTE — H&P ADULT - NSHPPHYSICALEXAM_GEN_ALL_CORE
T(C): 36.4 (08-06-24 @ 15:28), Max: 36.4 (08-06-24 @ 15:28)  HR: 72 (08-06-24 @ 17:11) (72 - 105)  BP: 105/63 (08-06-24 @ 17:11) (105/63 - 146/82)  RR: 18 (08-06-24 @ 17:11) (18 - 18)  SpO2: 95% (08-06-24 @ 17:11) (94% - 99%)    CONSTITUTIONAL: Well groomed, no apparent distress  EYES: PERRL and symmetric, EOMI, No conjunctival or scleral injection, non-icteric  ENMT: Oral mucosa with moist membranes  NECK: Supple, symmetric and without tracheal deviation   RESP: No respiratory distress, no use of accessory muscles  CV: regular rate by palpation; no peripheral edema  GI: Soft, NT, ND  MENTAL STATUS: AAO x3; Awake; Opens eyes spontaneously; Appropriately conversant without aphasia; following simple commands  GCS: 15  Motor: left side hand  4/5, left bi/triceps and shoulder 3/5, Also decrease sensation throughout LUE  BLE 5/5  LUE hand  4/5, shoulder and bi/triceps 2/5  RUE: 5/5 strength  SKIN: Warm, dry T(C): 36.4 (08-06-24 @ 15:28), Max: 36.4 (08-06-24 @ 15:28)  HR: 72 (08-06-24 @ 17:11) (72 - 105)  BP: 105/63 (08-06-24 @ 17:11) (105/63 - 146/82)  RR: 18 (08-06-24 @ 17:11) (18 - 18)  SpO2: 95% (08-06-24 @ 17:11) (94% - 99%)    CONSTITUTIONAL: Well groomed, no apparent distress  EYES: PERRL and symmetric, EOMI, No conjunctival or scleral injection, non-icteric  ENMT: Oral mucosa with moist membranes  NECK: Supple, symmetric and without tracheal deviation   RESP: No respiratory distress, no use of accessory muscles  CV: regular rate by palpation; no peripheral edema  GI: Soft, NT, ND  MENTAL STATUS: AAO x3; Awake; Opens eyes spontaneously; Appropriately conversant without aphasia; following simple commands  GCS: 15  Motor: left side hand  2/5, left bi/triceps and shoulder 3/5, Also decrease sensation throughout LUE  BLE 5/5  LUE hand  2/5, shoulder and bi/triceps 3/5  RUE: 5/5 strength  SKIN: Warm, dry

## 2024-08-06 NOTE — ED PROVIDER NOTE - PHYSICAL EXAMINATION
General: Awake, alert, lying in bed in NAD  HEENT: Normocephalic, atraumatic. No scleral icterus or conjunctival injection. EOMI. Moist mucous membranes. Oropharynx clear.   Neck:. Soft and supple.  Cardiac: RRR, Peripheral pulses 2+ and symmetric. No LE edema.  Resp: Lungs CTAB. No accessory muscle use  Abd: Soft, non-tender, non-distended. No guarding, rebound, or rigidity.  Back: Spine midline and non-tender.   Skin: No rashes, abrasions, or lacerations.  Neuro: AO x 4. 1/5 strength to KYM. 5/5 strength to RUE, RLE, LLE. CN II-XII intact. clonic motion of LUE.   Psych: Appropriate mood and affect

## 2024-08-06 NOTE — H&P ADULT - NSHPLABSRESULTS_GEN_ALL_CORE
CT Angio Neck Stroke Protocol w/ IV Cont (08.06.24 @ 15:58) >  CTA NECK:  A left-sided aortic arch is demonstrated. There is normal relationship to   the great vessels. The common carotid arteries, internal carotid arteries   and vertebral arteries shows no evidence of significant stenosis,   occlusion or saccular aneurysm dilation. The vertebral arteries are   codominant.    CT Angio Brain Stroke Protocol  w/ IV Cont (08.06.24 @ 15:57) >  CTA BRAIN:  The [place] and vertebrobasilar system are unremarkable without   evidence of stenosis, occlusion or saccular aneurysm dilation. No   evidence for arterial venous malformation. The vertebral arteries are   codominant.< from: CT Brain Perfusion Maps Stroke (08.06.24 @ 15:53) >    CT PERFUSION:  Patient has only had less than 2 hours of symptoms may influence the CT   perfusion.    No core infarct or evidence of delayed mean transit time is identified.    CBF<30% volume: 11 ml in the right frontal lobe surgical cavity  Tmax>6.0 s volume: 6 ml in the right frontal lobe surgical cavity  Mismatch volume: -5 ml  Mismatch ratio: 0.5    CT Brain Stroke Protocol (08.06.24 @ 15:45) >  IMPRESSION: Small focus of hemorrhage in the posterior right frontal lobe   measures 9.8 mm on image 45 of series 6. Increased moderate to severe   vasogenic edema throughout the right frontal lobe, right parietal lobe,   and right temporal lobe. Contrast enhanced MRI of the brain is   recommended to evaluate for residual or recurrent neoplasm.

## 2024-08-06 NOTE — ED PROVIDER NOTE - OBJECTIVE STATEMENT
69 year-old female with PMHx Afib on ASA, HTN, HLD, brain mass s/p resection and radiation therapy sent by PMDs office for LUE weakness. onset was 1 hour PTA. patient also noted to have rhythmic motion of the LUE. states her keppra was stopped recently. she has associated headache. denies vision changes, speech changes, difficulty walking. denies any recent fever, cough, congestion, CP, SOB, abd pain, N/V/D, urinary symptoms.

## 2024-08-06 NOTE — ED ADULT NURSE NOTE - NSICDXPASTMEDICALHX_GEN_ALL_CORE_FT
PAST MEDICAL HISTORY:  History of brain tumor     HLD (hyperlipidemia)     Paroxysmal atrial fibrillation

## 2024-08-06 NOTE — H&P ADULT - ASSESSMENT
69 year-old female with PMHx Afib on ASA, HTN, HLD, brain mass s/p crani and resection by Dr. Guaman on 5/3/24 and radiation therapy sent by PMDs office for LUE weakness. onset was 1 hour PTA. patient also noted to have rhythmic motion of the LUE. States her keppra was stopped one week ago by her doctor. She has associated headache. Denies vision changes, speech changes, difficulty walking, recent fever, cough, congestion, CP, SOB, abd pain, N/V/D, urinary symptoms. Code stroke was called Physical exam revealed patient has left hand  4/5, LUE biceps/triceps/shoulder 3/5, and decreased sensation. Otherwise neurologically intact.     Plan:  Q2 neuro examinations  MRI brain w w/o  Neurology consult to evaluate for seizure or need to EEG  Dex 10mg loading dose, then 4Q6  Keppra 1G loading dose then 500 BID  admit to step down unit  Case discussed with Dr. Guaman    Note is incomplete:  Must confirm home medications 69 year-old female with PMHx Afib on cardizem (no AP/AC), HTN, HLD, brain mass s/p crani and resection by Dr. Guaman on 5/3/24 and radiation therapy sent by PCP office for LUE jerking lasting for 15 minutes and drooling onset was 1 hour PTA. Patient also noted to have rhythmic motion of the LUE at . States her keppra was stopped one week ago. Denies vision changes, speech changes, difficulty walking, recent fever, cough, congestion, CP, SOB, abd pain, N/V/D, urinary symptoms. Does not endorse any complaints at this time. Code stroke was called.  Physical exam revealed patient has left hand  2/5, LUE biceps/triceps/shoulder 3/5, and decreased LUE sensation, although patient describes her hand  weakness as baseline. Otherwise neurologically intact.  CT head shows Small focus of hemorrhage in the posterior right frontal lobe measures 9.8 mm on image 45 of series 6. Increased moderate to severe vasogenic edema throughout the right frontal lobe, right parietal lobe and right temporal lobe.    Plan:  Q2 neuro examinations  MRI brain w w/o  Neurology consult to evaluate for seizure or need to EEG  Dex 10mg loading dose, then 4Q6  Keppra 1G loading dose then 500 BID  admit to step down unit  BLE doppler screening  Case discussed with Dr. Guaman

## 2024-08-06 NOTE — CHART NOTE - NSCHARTNOTEFT_GEN_A_CORE
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

## 2024-08-06 NOTE — PATIENT PROFILE ADULT - FALL HARM RISK - FALL HARM RISK
Other Dermal Autograft Text: The defect edges were debeveled with a #15 scalpel blade.  Given the location of the defect, shape of the defect and the proximity to free margins a dermal autograft was deemed most appropriate.  Using a sterile surgical marker, the primary defect shape was transferred to the donor site. The area thus outlined was incised deep to adipose tissue with a #15 scalpel blade.  The harvested graft was then trimmed of adipose and epidermal tissue until only dermis was left.  The skin graft was then placed in the primary defect and oriented appropriately.

## 2024-08-06 NOTE — ED ADULT TRIAGE NOTE - CHIEF COMPLAINT QUOTE
c/o of L arm weakness/unable to . LKW 2PM pmhx of brain tumor with chemo/radiation completed in June. Code stroke

## 2024-08-06 NOTE — ED ADULT NURSE REASSESSMENT NOTE - NS ED NURSE REASSESS COMMENT FT1
Report given to Gela SAMUELS RN. Pt awake and alert, AOx4, speaking coherently, respirations even and unlabored on RA, skin warm and dry, NSR on CM. A 2 RN skin check has been performed on admission to CDu4L with RN witness Niyah TONG.  A pressure injury was not identified.  Documentation in the assessment to support findings.

## 2024-08-06 NOTE — ED ADULT NURSE NOTE - NSFALLOOBATTEMPT_ED_ALL_ED
Goal Outcome Evaluation:  Plan of Care Reviewed With: patient        Progress: improving  Outcome Evaluation: Pt did not want spironolactone this am, and later around 3 pm she wanted it, but her diastolic bp was below 60 twice in a row so she did not meet the criteria of the parameter that was ordered on the drug. Tolerated up in room and in chair. Started dressing on right posterior hip incision per order.          No

## 2024-08-06 NOTE — ED ADULT NURSE NOTE - NSFALLRISKINTERV_ED_ALL_ED

## 2024-08-07 LAB
ALBUMIN SERPL ELPH-MCNC: 4.3 G/DL — SIGNIFICANT CHANGE UP (ref 3.3–5.2)
ALP SERPL-CCNC: 63 U/L — SIGNIFICANT CHANGE UP (ref 40–120)
ALT FLD-CCNC: 22 U/L — SIGNIFICANT CHANGE UP
ANION GAP SERPL CALC-SCNC: 13 MMOL/L — SIGNIFICANT CHANGE UP (ref 5–17)
AST SERPL-CCNC: 16 U/L — SIGNIFICANT CHANGE UP
BILIRUB SERPL-MCNC: 0.4 MG/DL — SIGNIFICANT CHANGE UP (ref 0.4–2)
BUN SERPL-MCNC: 18.3 MG/DL — SIGNIFICANT CHANGE UP (ref 8–20)
CALCIUM SERPL-MCNC: 9.9 MG/DL — SIGNIFICANT CHANGE UP (ref 8.4–10.5)
CHLORIDE SERPL-SCNC: 105 MMOL/L — SIGNIFICANT CHANGE UP (ref 96–108)
CO2 SERPL-SCNC: 23 MMOL/L — SIGNIFICANT CHANGE UP (ref 22–29)
CREAT SERPL-MCNC: 0.41 MG/DL — LOW (ref 0.5–1.3)
EGFR: 106 ML/MIN/1.73M2 — SIGNIFICANT CHANGE UP
GLUCOSE SERPL-MCNC: 170 MG/DL — HIGH (ref 70–99)
MAGNESIUM SERPL-MCNC: 2.1 MG/DL — SIGNIFICANT CHANGE UP (ref 1.6–2.6)
PHOSPHATE SERPL-MCNC: 3 MG/DL — SIGNIFICANT CHANGE UP (ref 2.4–4.7)
POTASSIUM SERPL-MCNC: 3.8 MMOL/L — SIGNIFICANT CHANGE UP (ref 3.5–5.3)
POTASSIUM SERPL-SCNC: 3.8 MMOL/L — SIGNIFICANT CHANGE UP (ref 3.5–5.3)
PROT SERPL-MCNC: 6.8 G/DL — SIGNIFICANT CHANGE UP (ref 6.6–8.7)
SODIUM SERPL-SCNC: 141 MMOL/L — SIGNIFICANT CHANGE UP (ref 135–145)

## 2024-08-07 PROCEDURE — 70553 MRI BRAIN STEM W/O & W/DYE: CPT | Mod: 26

## 2024-08-07 PROCEDURE — 99223 1ST HOSP IP/OBS HIGH 75: CPT

## 2024-08-07 RX ADMIN — Medication 180 MILLIGRAM(S): at 06:07

## 2024-08-07 RX ADMIN — DEXAMETHASONE 4 MILLIGRAM(S): 1.5 TABLET ORAL at 17:37

## 2024-08-07 RX ADMIN — DEXAMETHASONE 4 MILLIGRAM(S): 1.5 TABLET ORAL at 00:42

## 2024-08-07 RX ADMIN — ATORVASTATIN CALCIUM 20 MILLIGRAM(S): 40 TABLET, FILM COATED ORAL at 21:46

## 2024-08-07 RX ADMIN — LEVETIRACETAM 500 MILLIGRAM(S): 1000 TABLET, FILM COATED ORAL at 17:37

## 2024-08-07 RX ADMIN — PANTOPRAZOLE SODIUM 40 MILLIGRAM(S): 20 TABLET, DELAYED RELEASE ORAL at 12:29

## 2024-08-07 RX ADMIN — DEXAMETHASONE 4 MILLIGRAM(S): 1.5 TABLET ORAL at 06:07

## 2024-08-07 RX ADMIN — LEVETIRACETAM 500 MILLIGRAM(S): 1000 TABLET, FILM COATED ORAL at 06:07

## 2024-08-07 RX ADMIN — DEXAMETHASONE 4 MILLIGRAM(S): 1.5 TABLET ORAL at 12:29

## 2024-08-07 NOTE — PROGRESS NOTE ADULT - SUBJECTIVE AND OBJECTIVE BOX
HPI:  HPI:  69 year-old female with PMHx Afib on cardizem (no AP/AC), HTN, HLD, brain mass s/p crani and resection by Dr. Guaman on 5/3/24 and radiation therapy sent by PCP office for LUE jerking lasting for 15 minutes and drooling onset was 1 hour PTA. Patient also noted to have rhythmic motion of the LUE at . States her keppra was stopped one week ago. Denies vision changes, speech changes, difficulty walking, recent fever, cough, congestion, CP, SOB, abd pain, N/V/D, urinary symptoms. Does not endorse any complaints at this time. Code stroke was called.       (06 Aug 2024 17:50)        INTERVAL HPI/OVERNIGHT EVENTS:  69y Female s/p __ seen lying comfortably in bed. Tolerating diet. Passing gas/BM. Voiding. Robb in with __ clear urine. Denies headache, weakness, numbness, n/v/d, fevers, chills, chest pain, SOB.       Vital Signs Last 24 Hrs  T(C): 37.1 (07 Aug 2024 10:09), Max: 37.1 (07 Aug 2024 10:09)  T(F): 98.8 (07 Aug 2024 10:09), Max: 98.8 (07 Aug 2024 10:09)  HR: 73 (07 Aug 2024 10:09) (64 - 105)  BP: 105/61 (07 Aug 2024 10:09) (105/61 - 146/82)  BP(mean): 76 (06 Aug 2024 17:11) (76 - 81)  RR: 18 (07 Aug 2024 10:09) (18 - 20)  SpO2: 97% (07 Aug 2024 10:09) (94% - 99%)    Parameters below as of 07 Aug 2024 10:09  Patient On (Oxygen Delivery Method): room air          PHYSICAL EXAM:  GENERAL: NAD, well-groomed, well-developed  HEAD:  Atraumatic, normocephalic  DRAINS:   WOUND: Dressing clean dry intact  MENTAL STATUS: AAO x3; Awake/Comatose; Opens eyes spontaneously/to voice/to light touch/to noxious stimuli; Appropriately conversant without aphasia/Nonverbal; following simple commands/mimicking/not following commands  CRANIAL NERVES: PERRL; EOMI; corneals intact b/l; Dolls sign positive; blinks to threat b/l; no facial asymmetry; facial sensation grossly intact to light touch b/l;  tongue midline; palate rises symmetrically; gag reflex intact  MOTOR: strength 5/5 B/L upper and lower extremities; sensation grossly intact all extremities; DTRs 2+ intact and symmetric; negative Arias's b/l; negative clonus b/l  CHEST/LUNG: Clear to auscultation bilaterally; no rales, rhonchi, wheezing, or rubs  HEART: +S1/+S2; Regular rate and rhythm; no murmurs, rubs, or gallops  ABDOMEN: Soft, nontender, nondistended; bowel sounds present all four quadrants  EXTREMITIES:  2+ peripheral pulses, no clubbing, cyanosis, or edema  SKIN: Warm, dry; no rashes or lesions      LABS:                        12.5   6.30  )-----------( 237      ( 06 Aug 2024 15:40 )             37.7     08-07    141  |  105  |  18.3  ----------------------------<  170<H>  3.8   |  23.0  |  0.41<L>    Ca    9.9      07 Aug 2024 04:40  Phos  3.0     08-07  Mg     2.1     08-07    TPro  6.8  /  Alb  4.3  /  TBili  0.4  /  DBili  x   /  AST  16  /  ALT  22  /  AlkPhos  63  08-07    PT/INR - ( 06 Aug 2024 15:40 )   PT: 9.5 sec;   INR: 0.85 ratio         PTT - ( 06 Aug 2024 15:40 )  PTT:30.6 sec  Urinalysis Basic - ( 07 Aug 2024 04:40 )    Color: x / Appearance: x / SG: x / pH: x  Gluc: 170 mg/dL / Ketone: x  / Bili: x / Urobili: x   Blood: x / Protein: x / Nitrite: x   Leuk Esterase: x / RBC: x / WBC x   Sq Epi: x / Non Sq Epi: x / Bacteria: x          RADIOLOGY & ADDITIONAL TESTS:     HPI:  69 year-old female with PMHx Afib on cardizem (no AP/AC), HTN, HLD, brain mass s/p crani and resection by Dr. Guaman on 5/3/24 and radiation therapy sent by PCP office for LUE jerking lasting for 15 minutes and drooling onset was 1 hour PTA. Patient also noted to have rhythmic motion of the LUE at . States her keppra was stopped one week ago. Denies vision changes, speech changes, difficulty walking, recent fever, cough, congestion, CP, SOB, abd pain, N/V/D, urinary symptoms. Does not endorse any complaints at this time. Code stroke was called.  (06 Aug 2024 17:50)      INTERVAL HPI/OVERNIGHT EVENTS:  Pt see and evaluated this morning. No acute NSG issues reported from oernight.     Vital Signs Last 24 Hrs  T(C): 37.1 (07 Aug 2024 10:09), Max: 37.1 (07 Aug 2024 10:09)  T(F): 98.8 (07 Aug 2024 10:09), Max: 98.8 (07 Aug 2024 10:09)  HR: 73 (07 Aug 2024 10:09) (64 - 105)  BP: 105/61 (07 Aug 2024 10:09) (105/61 - 146/82)  BP(mean): 76 (06 Aug 2024 17:11) (76 - 81)  RR: 18 (07 Aug 2024 10:09) (18 - 20)  SpO2: 97% (07 Aug 2024 10:09) (94% - 99%)    Parameters below as of 07 Aug 2024 10:09  Patient On (Oxygen Delivery Method): room air        PHYSICAL EXAM:  GENERAL: NAD, well-groomed, well-developed  HEAD:  Atraumatic, normocephalic  WOUND: Dressing clean dry intact  MENTAL STATUS: AAO x3; Awake/Comatose; Opens eyes spontaneously/to voice/to light touch/to noxious stimuli; Appropriately conversant without aphasia/Nonverbal; following simple commands/mimicking/not following commands  CRANIAL NERVES: PERRL; EOMI; corneals intact b/l; Dolls sign positive; blinks to threat b/l; no facial asymmetry; facial sensation grossly intact to light touch b/l;  tongue midline; palate rises symmetrically; gag reflex intact  MOTOR:  left side hand  2/5, left bi/triceps and shoulder 3/5, Also decrease sensation throughout LUE BLE 5/5 LUE hand  2/5, shoulder and bi/triceps 3/5 RUE: 5/5 strength      LABS:                        12.5   6.30  )-----------( 237      ( 06 Aug 2024 15:40 )             37.7     08-07    141  |  105  |  18.3  ----------------------------<  170<H>  3.8   |  23.0  |  0.41<L>    Ca    9.9      07 Aug 2024 04:40  Phos  3.0     08-07  Mg     2.1     08-07    TPro  6.8  /  Alb  4.3  /  TBili  0.4  /  DBili  x   /  AST  16  /  ALT  22  /  AlkPhos  63  08-07    PT/INR - ( 06 Aug 2024 15:40 )   PT: 9.5 sec;   INR: 0.85 ratio         PTT - ( 06 Aug 2024 15:40 )  PTT:30.6 sec  Urinalysis Basic - ( 07 Aug 2024 04:40 )    Color: x / Appearance: x / SG: x / pH: x  Gluc: 170 mg/dL / Ketone: x  / Bili: x / Urobili: x   Blood: x / Protein: x / Nitrite: x   Leuk Esterase: x / RBC: x / WBC x   Sq Epi: x / Non Sq Epi: x / Bacteria: x

## 2024-08-07 NOTE — PHYSICAL THERAPY INITIAL EVALUATION ADULT - PERTINENT HX OF CURRENT PROBLEM, REHAB EVAL
69 year-old female with PMHx Afib on cardizem (no AP/AC), HTN, HLD, brain mass s/p crani and resection by Dr. Guaman on 5/3/24 and radiation therapy sent by PCP office for LUE jerking lasting for 15 minutes and drooling onset was 1 hour PTA. Patient also noted to have rhythmic motion of the LUE at . States her keppra was stopped one week ago. Denies vision changes, speech changes, difficulty walking, recent fever, cough, congestion, CP, SOB, abd pain, N/V/D, urinary symptoms. Does not endorse any complaints at this time. Code stroke was called.  Physical exam revealed patient has left hand  2/5, LUE biceps/triceps/shoulder 3/5, and decreased LUE sensation, although patient describes her hand  weakness as baseline. Otherwise neurologically intact.

## 2024-08-07 NOTE — CONSULT NOTE ADULT - ASSESSMENT
The patient is a 69y Female with Glioblastoma Multiforme and seizures.   Now status post seizure. CT suggesting recurrent tumor.     Seizure.   Agree with continuing Keppra 500 mg q 12 h.  No need for EEG as focal onset and etiology known.    Glioblastoma Multiforme   ?Recurrence.   Agree with repeating brain MRI with contrast.   Steroids per neurosurgery.  PT/OT.    Case discussed with Dr Guaman (neurosurgery attending).

## 2024-08-07 NOTE — PHYSICAL THERAPY INITIAL EVALUATION ADULT - NEUROVASCULAR ASSESSMENT LLE
Addended by: YOKO MUSTAFA on: 3/20/2024 11:40 AM     Modules accepted: Orders     warm/no discoloration

## 2024-08-07 NOTE — CONSULT NOTE ADULT - SUBJECTIVE AND OBJECTIVE BOX
[facility]                                        Neurology at [place]                                  April Knight, & Nando                                      [address]                                           [place], [place], [address]                                                [phone]        CC: seizure and brain tumor     HISTORY:  The patient is a 69y Female with Glioblastoma Multiforme status post resection on 5/3/24.   She had been on Keppra but reports that it was stopped a week ago by neuro-oncologist.   She now presented after an episode of left UE rhythmic jerking followed by weakness.   She now reports improved strength in the left arm but not fully normal.    PAST MEDICAL & SURGICAL HISTORY:  HLD (hyperlipidemia)  Paroxysmal atrial fibrillation  History of brain tumor  H/O craniotomy  H/O brain tumor    MEDICATIONS  (STANDING):  atorvastatin 20 milliGRAM(s) Oral at bedtime  dexAMETHasone  Injectable 4 milliGRAM(s) IV Push every 6 hours  diltiazem    milliGRAM(s) Oral daily  levETIRAcetam   Injectable 500 milliGRAM(s) IV Push every 12 hours  pantoprazole  Injectable 40 milliGRAM(s) IV Push daily  polyethylene glycol 3350 17 Gram(s) Oral daily  senna 2 Tablet(s) Oral at bedtime  sodium chloride 0.9%. 1000 milliLiter(s) (60 mL/Hr) IV Continuous <Continuous>    MEDICATIONS  (PRN):  acetaminophen     Tablet .. 650 milliGRAM(s) Oral every 6 hours PRN Mild Pain (1 - 3)    Allergies  oxycodone (Unknown)    SOCIAL HISTORY:  Non smoker.     FAMILY HISTORY:  Family history of breast cancer in mother (Mother)  No known family history of stroke or seizure.     ROS:  Constitutional: The patient denies fevers or weight changes.  Neuro: As per HPI.  Eyes: Denies blurry vision.  Ears/nose/throat: Denies Tinnitus.   Cardiac: Denies chest pain. Denies palpitations.  Respiratory: Denies shortness of breath.  GI: Denies abdominal pain, nausea, or vomiting.  : Denies change in urinary pattern.  Integumentary: Denies rash.  Psych: Denies recent mood changes.  Heme: denies easy bleeding/bruising.    Exam:  Vital Signs Last 24 Hrs  T(C): 36.5 (07 Aug 2024 07:32), Max: 36.8 (06 Aug 2024 22:00)  T(F): 97.7 (07 Aug 2024 07:32), Max: 98.2 (06 Aug 2024 22:00)  HR: 79 (07 Aug 2024 07:32) (64 - 105)  BP: 114/69 (07 Aug 2024 07:32) (105/63 - 146/82)  BP(mean): 76 (06 Aug 2024 17:11) (76 - 81)  RR: 18 (07 Aug 2024 07:32) (18 - 20)  SpO2: 98% (07 Aug 2024 07:32) (94% - 99%)    Parameters below as of 07 Aug 2024 07:32  Patient On (Oxygen Delivery Method): room air    General: NAD.   Carotid bruits absent.     Mental status: The patient is awake, alert, and fully oriented. There is no aphasia. Attention span is normal. Patient is aware of current events.     Cranial nerves: There is no papilledema. Pupils react symmetrically to light. There is no visual field deficit to confrontation. Extraocular motion is full with no nystagmus.  Facial sensation is intact. Facial musculature is symmetric. Palate elevates symmetrically. Tongue is midline.    Motor: There is normal bulk and tone.  Strength is 5/5 in the right arm and leg.   Strength is 5-/5 in the left arm and 5/5 in the left leg.    Sensation: Intact to light touch and pin. There is no extinction to double simultaneous stimulation.    Reflexes: 1+ throughout and plantar responses are flexor.    Cerebellar: There is no dysmetria on finger to nose testing.    LABS:                         12.5   6.30  )-----------( 237      ( 06 Aug 2024 15:40 )             37.7       08-07    141  |  105  |  18.3  ----------------------------<  170<H>  3.8   |  23.0  |  0.41<L>    Ca    9.9      07 Aug 2024 04:40  Phos  3.0     08-07  Mg     2.1     08-07    TPro  6.8  /  Alb  4.3  /  TBili  0.4  /  DBili  x   /  AST  16  /  ALT  22  /  AlkPhos  63  08-07  PT/INR - ( 06 Aug 2024 15:40 )   PT: 9.5 sec;   INR: 0.85 ratio    PTT - ( 06 Aug 2024 15:40 )  PTT:30.6 sec    RADIOLOGY   CT head images reviewed: There is enhancement and perfusion at the surgical site suggesting tumor recurrence.

## 2024-08-07 NOTE — PHYSICAL THERAPY INITIAL EVALUATION ADULT - LEVEL OF INDEPENDENCE: GAIT, REHAB EVAL
UR call received from kristina Jaquez with [facility] [employer]  indicating a peer to peer discussion is requested in order to facilitate this patient returning to the [facility] for his ongoing AALIYAH  Email was sent to DINA Alston today with direction for the call.   A peer case review is available until 12PM on Tuesday, May 31st by calling T [phone]. Option 5  Subscriber ID [id number]  CM team awaits outcome of the discussion. Attending provider notified. ELIE Pelaez     pt amb 50' with own cane contact guard with verbal cues for left obstacle avoidance(which pt reports is pre-morbid); pt amb supervision 50' with RW with improved gait velocity, however still with cues for left avoidance.

## 2024-08-07 NOTE — PHYSICAL THERAPY INITIAL EVALUATION ADULT - MANUAL MUSCLE TESTING RESULTS, REHAB EVAL
right shoulder flex 4+/5, elbow flex 4+/5, hip flex 4+/5, knee ext 4+/5, ankle df 4+/5; left shoulder flex 4-/5, elbow flex 3+/5, hip flex 4/5, knee ext 4/5, ankle df 4-/5

## 2024-08-08 ENCOUNTER — TRANSCRIPTION ENCOUNTER (OUTPATIENT)
Age: 69
End: 2024-08-08

## 2024-08-08 ENCOUNTER — APPOINTMENT (OUTPATIENT)
Dept: NEUROSURGERY | Facility: CLINIC | Age: 69
End: 2024-08-08

## 2024-08-08 ENCOUNTER — APPOINTMENT (OUTPATIENT)
Dept: HEMATOLOGY ONCOLOGY | Facility: CLINIC | Age: 69
End: 2024-08-08

## 2024-08-08 ENCOUNTER — NON-APPOINTMENT (OUTPATIENT)
Age: 69
End: 2024-08-08

## 2024-08-08 ENCOUNTER — APPOINTMENT (OUTPATIENT)
Dept: NEUROLOGY | Facility: CLINIC | Age: 69
End: 2024-08-08

## 2024-08-08 VITALS
OXYGEN SATURATION: 95 % | RESPIRATION RATE: 18 BRPM | DIASTOLIC BLOOD PRESSURE: 61 MMHG | HEART RATE: 98 BPM | SYSTOLIC BLOOD PRESSURE: 119 MMHG

## 2024-08-08 PROCEDURE — 81003 URINALYSIS AUTO W/O SCOPE: CPT

## 2024-08-08 PROCEDURE — 99291 CRITICAL CARE FIRST HOUR: CPT | Mod: 25

## 2024-08-08 PROCEDURE — 96374 THER/PROPH/DIAG INJ IV PUSH: CPT

## 2024-08-08 PROCEDURE — 82962 GLUCOSE BLOOD TEST: CPT

## 2024-08-08 PROCEDURE — 99233 SBSQ HOSP IP/OBS HIGH 50: CPT

## 2024-08-08 PROCEDURE — 85025 COMPLETE CBC W/AUTO DIFF WBC: CPT

## 2024-08-08 PROCEDURE — 70450 CT HEAD/BRAIN W/O DYE: CPT | Mod: MC

## 2024-08-08 PROCEDURE — 80053 COMPREHEN METABOLIC PANEL: CPT

## 2024-08-08 PROCEDURE — 85730 THROMBOPLASTIN TIME PARTIAL: CPT

## 2024-08-08 PROCEDURE — 85610 PROTHROMBIN TIME: CPT

## 2024-08-08 PROCEDURE — 93005 ELECTROCARDIOGRAM TRACING: CPT

## 2024-08-08 PROCEDURE — 80307 DRUG TEST PRSMV CHEM ANLYZR: CPT

## 2024-08-08 PROCEDURE — 97163 PT EVAL HIGH COMPLEX 45 MIN: CPT

## 2024-08-08 PROCEDURE — 36415 COLL VENOUS BLD VENIPUNCTURE: CPT

## 2024-08-08 PROCEDURE — 70553 MRI BRAIN STEM W/O & W/DYE: CPT | Mod: MC

## 2024-08-08 PROCEDURE — 70498 CT ANGIOGRAPHY NECK: CPT | Mod: MC

## 2024-08-08 PROCEDURE — 84100 ASSAY OF PHOSPHORUS: CPT

## 2024-08-08 PROCEDURE — 70496 CT ANGIOGRAPHY HEAD: CPT | Mod: MC

## 2024-08-08 PROCEDURE — 84484 ASSAY OF TROPONIN QUANT: CPT

## 2024-08-08 PROCEDURE — 83735 ASSAY OF MAGNESIUM: CPT

## 2024-08-08 PROCEDURE — 0042T: CPT | Mod: MC

## 2024-08-08 PROCEDURE — 96375 TX/PRO/DX INJ NEW DRUG ADDON: CPT

## 2024-08-08 RX ORDER — DEXAMETHASONE 1.5 MG/1
4 TABLET ORAL EVERY 6 HOURS
Refills: 0 | Status: DISCONTINUED | OUTPATIENT
Start: 2024-08-08 | End: 2024-08-08

## 2024-08-08 RX ORDER — PANTOPRAZOLE SODIUM 20 MG/1
1 TABLET, DELAYED RELEASE ORAL
Qty: 30 | Refills: 0
Start: 2024-08-08 | End: 2024-09-06

## 2024-08-08 RX ORDER — TAMSULOSIN HCL 0.4 MG
0.4 CAPSULE ORAL AT BEDTIME
Refills: 0 | Status: DISCONTINUED | OUTPATIENT
Start: 2024-08-08 | End: 2024-08-08

## 2024-08-08 RX ORDER — TAMSULOSIN HCL 0.4 MG
0.4 CAPSULE ORAL ONCE
Refills: 0 | Status: COMPLETED | OUTPATIENT
Start: 2024-08-08 | End: 2024-08-08

## 2024-08-08 RX ORDER — ACETAMINOPHEN 500 MG
2 TABLET ORAL
Qty: 80 | Refills: 0
Start: 2024-08-08 | End: 2024-08-17

## 2024-08-08 RX ORDER — FENOFIBRATE 30 MG/1
134 CAPSULE ORAL DAILY
Refills: 0 | Status: DISCONTINUED | OUTPATIENT
Start: 2024-08-08 | End: 2024-08-08

## 2024-08-08 RX ORDER — TAMSULOSIN HCL 0.4 MG
1 CAPSULE ORAL
Qty: 30 | Refills: 0
Start: 2024-08-08 | End: 2024-09-06

## 2024-08-08 RX ORDER — DEXAMETHASONE 1.5 MG/1
1 TABLET ORAL
Qty: 120 | Refills: 0
Start: 2024-08-08 | End: 2024-09-06

## 2024-08-08 RX ORDER — LEVETIRACETAM 1000 MG/1
1 TABLET, FILM COATED ORAL
Qty: 360 | Refills: 0
Start: 2024-08-08 | End: 2024-09-06

## 2024-08-08 RX ORDER — DOCUSATE SODIUM AND SENNOSIDES 8.6; 5 MG/1; MG/1
2 TABLET ORAL
Qty: 60 | Refills: 0
Start: 2024-08-08 | End: 2024-09-06

## 2024-08-08 RX ADMIN — Medication 0.4 MILLIGRAM(S): at 05:17

## 2024-08-08 RX ADMIN — Medication 180 MILLIGRAM(S): at 05:17

## 2024-08-08 RX ADMIN — DEXAMETHASONE 4 MILLIGRAM(S): 1.5 TABLET ORAL at 00:00

## 2024-08-08 RX ADMIN — LEVETIRACETAM 500 MILLIGRAM(S): 1000 TABLET, FILM COATED ORAL at 05:18

## 2024-08-08 RX ADMIN — DEXAMETHASONE 4 MILLIGRAM(S): 1.5 TABLET ORAL at 05:17

## 2024-08-08 NOTE — PROGRESS NOTE ADULT - ASSESSMENT
69y Female with Glioblastoma Multiforme and seizures.   Now status post seizure. CT suggesting recurrent tumor.     Seizure.   Continue Keppra 500 mg q 12 h.  No need for EEG as focal onset and etiology known.    Glioblastoma Multiforme   ?Recurrence.   MRI as above.   Steroids per neurosurgery.  Will need to follow up with neuro-oncology.    Case discussed with Dr Guaman (neurosurgery attending).  
69 year-old female with PMHx Afib on cardizem (no AP/AC), HTN, HLD, brain mass s/p crani and resection by Dr. Guaman on 5/3/24 and radiation therapy sent by PCP office for LUE jerking lasting for 15 minutes and drooling onset was 1 hour PTA. Patient also noted to have rhythmic motion of the LUE at . States her keppra was stopped one week ago. Denies vision changes, speech changes, difficulty walking, recent fever, cough, congestion, CP, SOB, abd pain, N/V/D, urinary symptoms. Does not endorse any complaints at this time. Code stroke was called.  Physical exam revealed patient has left hand  2/5, LUE biceps/triceps/shoulder 3/5, and decreased LUE sensation, although patient describes her hand  weakness as baseline. Otherwise neurologically intact.      Plan:  -Q2 neuro examinations  -MRI brain w w/o pending  -Dex 4Q6  -Keppra 500 BID  -admit to step down unit  -pt seen and case discussed w/ Dr. Guaman

## 2024-08-08 NOTE — DISCHARGE NOTE NURSING/CASE MANAGEMENT/SOCIAL WORK - NSDCFUADDAPPT_GEN_ALL_CORE_FT
cont w outpatient PT   case was d/w Dr Ashley who states that brain findings likely related to post-radiation changes & recommends OP f/u, continuation of decadron & taper/ protonix and repeat MRI Brain in 1 month as OP and possible PET Scan.  follow-up with Dr Guaman in 1-2 weeks after discharge ]  pt will also need to f/u with neurology as outpatient to see if keppra needs to cont or can be weaned off in the future.

## 2024-08-08 NOTE — OCCUPATIONAL THERAPY INITIAL EVALUATION ADULT - PERTINENT HX OF CURRENT PROBLEM, REHAB EVAL
PMHx Afib on cardizem (no AP/AC), HTN, HLD, brain mass s/p crani and resection by Dr. Guaman on 5/3/24 and radiation therapy sent by PCP office for LUE jerking lasting for 15 minutes and drooling onset was 1 hour PTA. Pt also noted to have rhythmic motion of the LUE. Code stroke was called. Physical exam revealed patient has left hand  2/5, LUE biceps/triceps/shoulder 3/5, and decreased LUE sensation, although pt describes her hand  weakness as baseline.

## 2024-08-08 NOTE — DISCHARGE NOTE PROVIDER - NSDCMRMEDTOKEN_GEN_ALL_CORE_FT
acetaminophen 325 mg oral tablet: 2 tab(s) orally every 6 hours as needed for Mild Pain (1 - 3) headache or fever  atorvastatin 20 mg oral tablet: 1 tab(s) orally once a day  dexAMETHasone 4 mg oral tablet: 1 tab(s) orally every 6 hours to be adjusted by oncology at office visit  Diltia  mg/24 hours oral capsule, extended release: 1 cap(s) orally once a day  fenofibrate 134 mg oral capsule: 1 cap(s) orally once a day  Keppra 500 mg oral tablet: 1 tab(s) orally 12 times a day  Protonix 40 mg oral delayed release tablet: 1 tab(s) orally once a day while on steroids to prevent stomach ulcer  Senna Plus 50 mg-8.6 mg oral tablet: 2 tab(s) orally once a day (at bedtime) for constipation, hold for loose stool  tamsulosin 0.4 mg oral capsule: 1 cap(s) orally once a day (at bedtime)

## 2024-08-08 NOTE — DISCHARGE NOTE PROVIDER - CARE PROVIDER_API CALL
William Guaman  Neurosurgery  [address] [place] [id number]  Phone: [phone]  Fax: [phone]  Established Patient  Follow Up Time: 1 week    SARAH LIMA  [address] [place] [address]  Phone: [phone]  Fax: ()-  Established Patient  Follow Up Time: 1-3 days   William Guaman  Neurosurgery  [address] [place] [id number]  Phone: [phone]  Fax: [phone]  Established Patient  Follow Up Time: 1 week    SARAH LIMA  [address] [place] [address]  Phone: [phone]  Fax: ()-  Established Patient  Follow Up Time: 1-3 days    Kofi Melendez  Neurology  [address]  [place] [address]  Phone: [phone]  Fax: [phone]  Established Patient  Follow Up Time: 2 weeks

## 2024-08-08 NOTE — DISCHARGE NOTE PROVIDER - PROVIDER TOKENS
PROVIDER:[TOKEN:[[id number]],FOLLOWUP:[1 week],ESTABLISHEDPATIENT:[T]],PROVIDER:[TOKEN:[[id number]],FOLLOWUP:[1-3 days],ESTABLISHEDPATIENT:[T]] PROVIDER:[TOKEN:[[id number]],FOLLOWUP:[1 week],ESTABLISHEDPATIENT:[T]],PROVIDER:[TOKEN:[[id number]:[id number]],FOLLOWUP:[1-3 days],ESTABLISHEDPATIENT:[T]],PROVIDER:[TOKEN:[[id number]],FOLLOWUP:[2 weeks],ESTABLISHEDPATIENT:[T]]

## 2024-08-08 NOTE — DISCHARGE NOTE PROVIDER - NSDCFUSCHEDAPPT_GEN_ALL_CORE_FT
[facility]  [facility]  Scheduled Appointment: 08/08/2024    Iftikhar Ashley  [facility]  NEUROLOGY [address]  Scheduled Appointment: 08/08/2024    [facility]  [facility]  Scheduled Appointment: 08/22/2024    Iftikhar Ashley  [facility]  NEUROLOGY [address]  Scheduled Appointment: 08/22/2024    [facility]  RADMED [address]  Scheduled Appointment: 09/09/2024

## 2024-08-08 NOTE — DISCHARGE NOTE PROVIDER - NSDCFUADDAPPT_GEN_ALL_CORE_FT
cont w outpatient PT   case was d/w Dr Ashley who states that brain findings likely related to post-radiation changes & recommends OP f/u, continuation of decadron & taper/ protonix and repeat MRI Brain in 1 month as OP and possible PET Scan.  follow-up with Dr Guaman in 1-2 weeks after discharge  cont w outpatient PT   case was d/w Dr Ashley who states that brain findings likely related to post-radiation changes & recommends OP f/u, continuation of decadron & taper/ protonix and repeat MRI Brain in 1 month as OP and possible PET Scan.  follow-up with Dr Guaman in 1-2 weeks after discharge ]  pt will also need to f/u with neurology as outpatient to see if keppra needs to cont or can be weaned off in the future.

## 2024-08-08 NOTE — PROGRESS NOTE ADULT - SUBJECTIVE AND OBJECTIVE BOX
[facility]                                        Neurology at [place]                                 April Knight & Nando                                  [address]                                        [place], [place], [address]                                             [phone]        CC: seizure and brain tumor     HPI:   The patient is a 69y Female with Glioblastoma Multiforme status post resection on 5/3/24.   She had been on Keppra but reports that it was stopped a week ago by neuro-oncologist.   She now presented after an episode of left UE rhythmic jerking followed by weakness.   She now reports improved strength in the left arm but not fully normal.    Interim history:  Now on 3 [place].     ROS:   Denies headache or dizziness.  Denies chest pain.  Denies shortness of breath.    MEDICATIONS  (STANDING):  atorvastatin 20 milliGRAM(s) Oral at bedtime  dexAMETHasone     Tablet 4 milliGRAM(s) Oral every 6 hours  diltiazem    milliGRAM(s) Oral daily  fenofibrate Tablet 134 milliGRAM(s) Oral daily  levETIRAcetam   Injectable 500 milliGRAM(s) IV Push every 12 hours  pantoprazole  Injectable 40 milliGRAM(s) IV Push daily  polyethylene glycol 3350 17 Gram(s) Oral daily  senna 2 Tablet(s) Oral at bedtime  tamsulosin 0.4 milliGRAM(s) Oral at bedtime    Vital Signs Last 24 Hrs  T(C): 36.6 (08 Aug 2024 08:00), Max: 36.9 (07 Aug 2024 16:11)  T(F): 97.8 (08 Aug 2024 08:00), Max: 98.5 (07 Aug 2024 16:11)  HR: 78 (08 Aug 2024 08:01) (72 - 82)  BP: 118/77 (08 Aug 2024 08:01) (106/52 - 130/67)  BP(mean): 92 (08 Aug 2024 08:01) (60 - 92)  RR: 18 (08 Aug 2024 08:01) (16 - 18)  SpO2: 98% (08 Aug 2024 08:01) (95% - 98%)    Parameters below as of 08 Aug 2024 08:01  Patient On (Oxygen Delivery Method): room air    Detailed Neurologic Exam:    Mental status: The patient is awake and alert. There is no aphasia. There is no dysarthria.     Cranial nerves: Pupils react symmetrically to light. There is no visual field deficit to confrontation. Extraocular motion is full with no nystagmus.  Facial sensation is intact. Facial musculature is symmetric. Palate elevates symmetrically. Tongue is midline.    Motor: There is normal bulk and tone.  Strength is 5/5 in the right arm and leg.   Strength is 5-/5 in the left arm and 5/5 in the left leg.    Sensation: Intact to light touch and pin. There is no extinction to double simultaneous stimulation.    Reflexes: 1+ throughout and plantar responses are flexor.    Cerebellar: There is no dysmetria on finger to nose testing.    Labs:     08-07    141  |  105  |  18.3  ----------------------------<  170<H>  3.8   |  23.0  |  0.41<L>    Ca    9.9      07 Aug 2024 04:40  Phos  3.0     08-07  Mg     2.1     08-07    TPro  6.8  /  Alb  4.3  /  TBili  0.4  /  DBili  x   /  AST  16  /  ALT  22  /  AlkPhos  63  08-07                            12.5   6.30  )-----------( 237      ( 06 Aug 2024 15:40 )             37.7       Rad:   MRI brain images reviewed (and concur with report): There is nodular peripherally enhancing tumor posterior and superior to the operative bed.  The extent of nonenhancing infiltrative tumor/vasogenic edema within the right cerebral hemisphere has also substantially increased.

## 2024-08-08 NOTE — OCCUPATIONAL THERAPY INITIAL EVALUATION ADULT - ADDITIONAL COMMENTS
Pt has a tub with doors, grab bars and tub transfer bench. Pt owns a cane, 2 RW, tub bench. Pt is R handed and does not drive.

## 2024-08-08 NOTE — DISCHARGE NOTE PROVIDER - HOSPITAL COURSE
HPI:  69 year-old female with PMHx Afib on cardizem (no AP/AC), HTN, HLD, brain mass s/p crani and resection by Dr. Guaman on 5/3/24 and radiation therapy sent by PCP office for LUE jerking lasting for 15 minutes and drooling onset was 1 hour PTA. Patient also noted to have rhythmic motion of the LUE. States her keppra was stopped one week ago. Denies vision changes, speech changes, difficulty walking, recent fever, cough, congestion, CP, SOB, abd pain, N/V/D, urinary symptoms. Does not endorse any complaints at this time. Code stroke was called. (06 Aug 2024 17:50)    INTERVAL HPI/OVERNIGHT EVENTS:  Pt see and evaluated this morning. No acute NSG issues reported from overnight. pt is at her baseline and participating w PT/OT who recommend d/c home w Assist and OP therapy.  case was d/w Dr Ashley who states that brain findings likely related to post-radiation changes & recommends OP f/u, continuation of decadron & taper/ protonix and repeat MRI Brain in 1 month as OP and possible PET Scan. HPI:  69 year-old female with PMHx Afib on cardizem (no AP/AC), HTN, HLD, brain mass s/p crani and resection by Dr. Guaman on 5/3/24 and radiation therapy sent by PCP office for LUE jerking lasting for 15 minutes and drooling onset was 1 hour PTA. Patient also noted to have rhythmic motion of the LUE. States her keppra was stopped one week ago. Denies vision changes, speech changes, difficulty walking, recent fever, cough, congestion, CP, SOB, abd pain, N/V/D, urinary symptoms. Does not endorse any complaints at this time. Code stroke was called. (06 Aug 2024 17:50)    INTERVAL HPI/OVERNIGHT EVENTS:  Pt see and evaluated this morning. No acute NSG issues reported from overnight. pt is at her baseline and participating w PT/OT who recommend d/c home w Assist and OP therapy.  case was d/w Dr Ashley who states that brain findings likely related to post-radiation changes & recommends OP f/u, continuation of decadron & taper/ protonix and repeat MRI Brain in 1 month as OP and possible PET Scan. pt will also need to f/u with neurology as outpatient to see if keppra needs to cont or can be weaned off in the future.

## 2024-08-08 NOTE — DISCHARGE NOTE NURSING/CASE MANAGEMENT/SOCIAL WORK - NSDCPEFALRISK_GEN_ALL_CORE
For information on Fall & Injury Prevention, visit: [email or web address] OR  [email or web address] OR  https://www.cdc.gov/steadi/patient.html

## 2024-08-08 NOTE — OCCUPATIONAL THERAPY INITIAL EVALUATION ADULT - DIAGNOSIS, OT EVAL
69 year old Female with Glioblastoma Multiforme and seizures, now s/p seizure. CT suggesting recurrent tumor.

## 2024-08-08 NOTE — OCCUPATIONAL THERAPY INITIAL EVALUATION ADULT - LIVES WITH, PROFILE
Pt lives in a ranch style house with her spouse who can assist. 3 YASH with L eduard, no steps inside to negotiate./spouse

## 2024-08-08 NOTE — DISCHARGE NOTE NURSING/CASE MANAGEMENT/SOCIAL WORK - PATIENT PORTAL LINK FT
You can access the FollowMyHealth Patient Portal offered by [facility] by registering at the following website: [email or web address]. By joining [facility]’s FollowMyHealth portal, you will also be able to view your health information using other applications (apps) compatible with our system.

## 2024-08-08 NOTE — DISCHARGE NOTE PROVIDER - NSDCCPCAREPLAN_GEN_ALL_CORE_FT
PRINCIPAL DISCHARGE DIAGNOSIS  Diagnosis: Seizure  Assessment and Plan of Treatment:       SECONDARY DISCHARGE DIAGNOSES  Diagnosis: Glioblastoma  Assessment and Plan of Treatment:

## 2024-08-08 NOTE — DISCHARGE NOTE PROVIDER - CARE PROVIDERS DIRECT ADDRESSES
,[email or web address] ,[email or web address],DirectAddress_Unknown,[email or web address]

## 2024-08-10 PROBLEM — Z87.898 PERSONAL HISTORY OF OTHER SPECIFIED CONDITIONS: Chronic | Status: ACTIVE | Noted: 2024-08-06

## 2024-08-14 ENCOUNTER — OUTPATIENT (OUTPATIENT)
Dept: OUTPATIENT SERVICES | Facility: HOSPITAL | Age: 69
LOS: 1 days | Discharge: HOME | End: 2024-08-14

## 2024-08-14 DIAGNOSIS — Z87.898 PERSONAL HISTORY OF OTHER SPECIFIED CONDITIONS: Chronic | ICD-10-CM

## 2024-08-14 DIAGNOSIS — Z98.890 OTHER SPECIFIED POSTPROCEDURAL STATES: Chronic | ICD-10-CM

## 2024-08-14 DIAGNOSIS — D64.9 ANEMIA, UNSPECIFIED: ICD-10-CM

## 2024-08-15 ENCOUNTER — APPOINTMENT (OUTPATIENT)
Dept: NEUROSURGERY | Facility: CLINIC | Age: 69
End: 2024-08-15
Payer: MEDICARE

## 2024-08-15 ENCOUNTER — RESULT REVIEW (OUTPATIENT)
Age: 69
End: 2024-08-15

## 2024-08-15 ENCOUNTER — APPOINTMENT (OUTPATIENT)
Dept: NEUROLOGY | Facility: CLINIC | Age: 69
End: 2024-08-15
Payer: MEDICARE

## 2024-08-15 ENCOUNTER — APPOINTMENT (OUTPATIENT)
Dept: HEMATOLOGY ONCOLOGY | Facility: CLINIC | Age: 69
End: 2024-08-15

## 2024-08-15 VITALS
WEIGHT: 142.2 LBS | BODY MASS INDEX: 26.85 KG/M2 | RESPIRATION RATE: 17 BRPM | OXYGEN SATURATION: 97 % | HEIGHT: 61 IN | DIASTOLIC BLOOD PRESSURE: 73 MMHG | SYSTOLIC BLOOD PRESSURE: 135 MMHG | TEMPERATURE: 97.2 F | HEART RATE: 81 BPM

## 2024-08-15 VITALS
WEIGHT: 143 LBS | TEMPERATURE: 97.1 F | BODY MASS INDEX: 27 KG/M2 | DIASTOLIC BLOOD PRESSURE: 65 MMHG | HEART RATE: 77 BPM | OXYGEN SATURATION: 97 % | SYSTOLIC BLOOD PRESSURE: 131 MMHG | HEIGHT: 61 IN

## 2024-08-15 DIAGNOSIS — G40.909 EPILEPSY, UNSPECIFIED, NOT INTRACTABLE, W/OUT STATUS EPILEPTICUS: ICD-10-CM

## 2024-08-15 DIAGNOSIS — G93.6 CEREBRAL EDEMA: ICD-10-CM

## 2024-08-15 DIAGNOSIS — G81.94 HEMIPLEGIA, UNSPECIFIED AFFECTING LEFT NONDOMINANT SIDE: ICD-10-CM

## 2024-08-15 DIAGNOSIS — C71.9 MALIGNANT NEOPLASM OF BRAIN, UNSPECIFIED: ICD-10-CM

## 2024-08-15 LAB
BASOPHILS # BLD AUTO: 0 K/UL — SIGNIFICANT CHANGE UP (ref 0–0.2)
BASOPHILS NFR BLD AUTO: 0.3 % — SIGNIFICANT CHANGE UP (ref 0–2)
EOSINOPHIL # BLD AUTO: 0 K/UL — SIGNIFICANT CHANGE UP (ref 0–0.5)
EOSINOPHIL NFR BLD AUTO: 0 % — SIGNIFICANT CHANGE UP (ref 0–6)
HCT VFR BLD CALC: 42.3 % — SIGNIFICANT CHANGE UP (ref 34.5–45)
HGB BLD-MCNC: 14.3 G/DL — SIGNIFICANT CHANGE UP (ref 11.5–15.5)
LYMPHOCYTES # BLD AUTO: 0.3 K/UL — LOW (ref 1–3.3)
LYMPHOCYTES # BLD AUTO: 2.2 % — LOW (ref 13–44)
MCHC RBC-ENTMCNC: 31.6 PG — SIGNIFICANT CHANGE UP (ref 27–34)
MCHC RBC-ENTMCNC: 33.7 G/DL — SIGNIFICANT CHANGE UP (ref 32–36)
MCV RBC AUTO: 93.8 FL — SIGNIFICANT CHANGE UP (ref 80–100)
MONOCYTES # BLD AUTO: 0.3 K/UL — SIGNIFICANT CHANGE UP (ref 0–0.9)
MONOCYTES NFR BLD AUTO: 2.7 % — SIGNIFICANT CHANGE UP (ref 2–14)
NEUTROPHILS # BLD AUTO: 11.6 K/UL — HIGH (ref 1.8–7.4)
NEUTROPHILS NFR BLD AUTO: 94.8 % — HIGH (ref 43–77)
PLATELET # BLD AUTO: 226 K/UL — SIGNIFICANT CHANGE UP (ref 150–400)
RBC # BLD: 4.51 M/UL — SIGNIFICANT CHANGE UP (ref 3.8–5.2)
RBC # FLD: 12.6 % — SIGNIFICANT CHANGE UP (ref 10.3–14.5)
WBC # BLD: 12.2 K/UL — HIGH (ref 3.8–10.5)
WBC # FLD AUTO: 12.2 K/UL — HIGH (ref 3.8–10.5)

## 2024-08-15 PROCEDURE — 99215 OFFICE O/P EST HI 40 MIN: CPT

## 2024-08-15 PROCEDURE — 99214 OFFICE O/P EST MOD 30 MIN: CPT

## 2024-08-15 RX ORDER — METFORMIN HYDROCHLORIDE 500 MG/1
500 TABLET, COATED ORAL
Refills: 0 | Status: ACTIVE | COMMUNITY

## 2024-08-15 NOTE — REASON FOR VISIT
[Follow-Up: _____] : a [unfilled] follow-up visit [de-identified] : Hospital Course:  Discharge Date 08-May-2024  Admission Date 30-Apr-2024 08:46  Reason for Admission R frontoparietal brain mass  Hospital Course   68 year-old female with PMHx Afib on ASA, HTN, HLD presents to [facility] for brain  mass resection. Patient initially presented on 4/19/24 for 2 weeks of  difficulty with fine motor skills (buttoning shirt, combing hair, holding  utensils). CT Head at that time revealed a right frontoparietal mass for which  neurosurgery was consulted. Pt was admitted to medicine for mets workup, CT CAP  was negative. MR Brain w/wo showed 3.7 cm x 4.2 cm x 4.2 cm necrotic enhancing  heterogeneous mass in the posterior right frontal lobe w/ moderate edema  suspicious for Glioma neoplasm. Has been taking Keppra 500 BID for seizure ppx.  Last dose of ASA was 4/28. Reports fine motor skills have been improving and  otherwise feels well. Patient underwent a on 5/3/24 a cranio for brain tumor  resection with  Dr. Guaman. Patient presents with daughter and  She denies any headaches, n/v or vision changes.  She has not had any seizures.   Denies any numbness/tingling or weakness of extremities.   Ambulating independently.  No balance or gait disturbances.   No unexplained falls.  [Family Member] : family member

## 2024-08-15 NOTE — ASSESSMENT
[FreeTextEntry1] : Ms. Fuchs is doing well from the postoperative perspective.  She is neurologically intact at today's visit.  But she does have some left-sided residual weakness. There is no swelling or evidence of drainage.  She continues to take dexamethasone twice daily and Keppra 500 twice daily for seizure prophylaxis.  She is very accompanied by her daughter and  who states she is doing well.  Final pathology reveals glioblastoma who grade 4.   Continue with Keppra and dexamethasone as directed. Elevate lower extremities Patient has been given an opportunity to ask and have their questions answered to their satisfaction. Patient knows to call the office if there are any new or worsening symptoms.   I, Dr. William Guaman, personally performed the evaluation and management (E/M) services for this established patient who presents today. That E/M includes conducting the clinically appropriate interval history &/or exam and establishing a continued plan of care. Today, my LUCIA, Katalina Willis, was here to observe my evaluation and management service for this patient and follow the plan of care established by me going forward.

## 2024-08-19 PROBLEM — G40.909 SEIZURE CEREBRAL: Status: ACTIVE | Noted: 2024-08-19

## 2024-08-19 PROBLEM — G93.6 CEREBRAL EDEMA: Status: ACTIVE | Noted: 2024-08-19

## 2024-08-19 NOTE — PATIENT PROFILE ADULT - BILL PAYMENT
.    [facility]  Infectious Diseases Progress Note      Krunal Alonso Patient Status:  Inpatient    1945 MRN [id number]   Location [facility] [address] Attending Cecilia Persaud MD   Hosp Day # 1 PCP Pcp Outside [facility], Unknown       ANTIMICROBIAL THERAPY:   Cefepime -  Vancomycin -    Subjective   History of Present Illness:  Krunal Alonso is a a(n) 79 year old male.   Subjective:   No overnight events  Pt reports feeling improved.    Allergies:  ALLERGIES:   Allergen Reactions    Dicloxacillin Other (See Comments) and RASH     Oral    Doxycycline Other (See Comments) and RASH     Oral    Enalapril Other (See Comments) and RASH     Oral    Lisinopril Other (See Comments) and RASH     Unknown         Objective   Vital signs: Blood pressure 124/73, pulse 96, temperature 98.8 °F (37.1 °C), temperature source Oral, resp. rate 16, height 5' 11\" (1.803 m), weight 100.3 kg (221 lb 1.9 oz), SpO2 96%.  Temp (24hrs), Av.2 °F (37.3 °C), Min:98.4 °F (36.9 °C), Max:100.4 °F (38 °C)       Chronically ill appearing. NAD  PERRL. Anicteric  OP clear, moist membranes  Neck supple. No LAD  Heart s1 s2 RRR  Lungs clear on anterior auscultation  Abdomen soft, nontender. Bs present  No lower extremity edema     Laboratory Data:  137 107 58 263   4.3 23 2.87      13.9 10.6 189    32.7      Lab Results   Component Value Date    LEUKA 26 to 100 (A) 2024    UNITR Negative 2024       Lab Results   Component Value Date    BLC No Growth <24 hours 2024    BLC No Growth <24 hours 2024     No results found for: \"URC\"     Imaging:  Results for orders placed or performed during the hospital encounter of 24 (from the past 48 hour(s))   XR CHEST PA OR AP 1 VIEW    Impression    Stable appearance. No airspace consolidation or overt pulmonary edema.  Chronic findings as described above.          Electronically Signed by: WILBERTO BROOKE M.D.   Signed on: 2024 6:34 AM   Workstation ID: [id number]   CT  ABDOMEN PELVIS WO CONTRAST    Impression    No evidence of bowel obstruction.    Dilated left renal pelvis, at the atrophic native kidney with mild dilated  proximal ureter with no definite stone. Findings are new from the prior  examination.    Infiltration of the perinephric fat associated with the left lower quadrant  renal transplant. No definite stone, no definite ureter stone or bladder  stone. Correlate with renal function tests, urinalysis.    Other findings as described above.                    Electronically Signed by: WILBERTO BROOKE M.D.   Signed on: 8/18/2024 7:16 AM   Workstation ID: [id number]       Assessment & Plan   ASSESSMENT:    79 year old man with past medical hx of lymphoma, prior renal transplant who was admitted after presenting with 2 weeks of fatigue and chronic diarrhea. He called EMS after he was unable to get off the floor. He was found to have significant leukocytosis. Urine with mild pyuria. E faecalis in urine and 1/2 sets in blood.  Febrile on admission    # Bacteremic urinary tract infection   Would usually dismiss single blood culture with E feacalis as contaminant. However, on this case, concordance of blood-urine isolate, presence of fever, and leukocytosis, would think it represents a true infection. Luckily, it is not high-grade bacteremia, making endocarditis much less likely.     - stop Cefepime  - start Vancomycin; dosing per pharmacy. Plan to treat x 2 weeks. May be able to do Ampicillin if isolate is susceptible.   - follow blood/urine cultures for susceptibilities  - obtain TTE: done today. No significant changes from prior.   - follow repeat blood cultures x 2       We will continue to follow        Elijah Banks MD  [facility], [facility]  [phone]  8/19/2024  5:10 PM   no

## 2024-08-19 NOTE — DATA REVIEWED
[de-identified] : Pre and post operative MRI brain form 4/24 and 5/24 show resection of right frontal enhancing tumor, with area of DWI medial to resection cavity, 8/24 MRI with increased edema and enhancement in site of prior surgery

## 2024-08-19 NOTE — HISTORY OF PRESENT ILLNESS
[FreeTextEntry1] : NEURO-ONCOLOGY  This 69 year old right handed woman is seen in follow up regarding glioblastoma  She presented with 3-4w of progressive left hand clumsiness and dropping things.  Her gait was a bit off as well.  An enhancing right frontal lesion was resected by Dr. Guaman on 5/3/24, revealing glioblastoma IDH wt, with small cell features.  Caris with MGMT methylated, mutations in CDKN2A/B deletion, TERT promoter, PIK3CA, NF1, MTAP, and TMB 4. No history of seizures.     INTERVAL HISTORY:  Completed RT late 7/24.  Admitted with seizures.  MRI brain showed increased edema and enhancement, started on keppra and dex increased.  Sleeping poorly on 6 daily, however left weakness is improved.  No further seizures.    PMH:  Afib.  PSH: none SHX:  here with  Bello.  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

## 2024-08-19 NOTE — DISCUSSION/SUMMARY
[FreeTextEntry1] : GBM, MGMT methylated s/p resection.  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  Discussed steroids and seizure meds, seizure precautions.   Plan for new MRI after 1-2 cycles of TMZ  TUMOR:   Plan for /m2 C1 to begin in 2w.  MRI likely after C1.   BRAIN EDEMA: dex to 4.    EPILEPSY: Keppra 500 bid  SUPPORTIVE: ppi.    HEME: CBC today normal, repeat in 2w  HEMIPARESIS: improved on my exam, PT/OT  RTC2w

## 2024-08-21 NOTE — HISTORY OF PRESENT ILLNESS
[FreeTextEntry1] : 69-year-old woman with right frontal lobe glioblastoma, status post resection on 5/3/2024 (WHO grade 4, IDH wild-type, with small cell features).  Completing radiation with temozolomide on 7/29/24 Interval history: admitted [facility] 8/6/24 with LUE jerking lasting for 15 minutes and drooling onset was 1 hour PTA. Patient also noted to have rhythmic motion of the LUE post stopping her Keppra. Ct head: IMPRESSION: Enhancement and perfusion at the surgical site suggesting tumor recurrence. No large vessel occlusion. Consider MRI of the brain for further evaluation. 8/7/24 MRI head: IMPRESSION:   Nodular peripherally enhancing tumor has developed posterior and superior to the operative bed. The extent of nonenhancing infiltrative tumor/vasogenic edema within the right cerebral hemisphere has also substantially increased.  No remote site of disease has developed. discharged 8/8/24   On dexamethasone 2 mg daily. Reports ongoing ataxia using a walker  headaches, nausea, vomiting, diplopia, blurry vision, ataxia, seizures, pain, fatigue or weight loss  Care team: neuro Onc Iftikhar Stark neuro pratima Guaman

## 2024-08-21 NOTE — HISTORY OF PRESENT ILLNESS
[FreeTextEntry1] : 69-year-old woman with right frontal lobe glioblastoma, status post resection on 5/3/2024 (WHO grade 4, IDH wild-type, with small cell features).  Completing radiation with temozolomide on 7/29/24 Interval history: admitted [facility] 8/6/24 with LUE jerking lasting for 15 minutes and drooling onset was 1 hour PTA. Patient also noted to have rhythmic motion of the LUE post stopping her Keppra. Ct head: IMPRESSION: Enhancement and perfusion at the surgical site suggesting tumor recurrence. No large vessel occlusion. Consider MRI of the brain for further evaluation. 8/7/24 MRI head: IMPRESSION:   Nodular peripherally enhancing tumor has developed posterior and superior to the operative bed. The extent of nonenhancing infiltrative tumor/vasogenic edema within the right cerebral hemisphere has also substantially increased.  No remote site of disease has developed. discharged 8/8/24   On dexamethasone 2 mg daily. Reports ongoing ataxia using a walker  headaches, nausea, vomiting, diplopia, blurry vision, ataxia, seizures, pain, fatigue or weight loss  Care team: neuro Onc Iftikhar Stark neuro pratima Guaman

## 2024-08-29 ENCOUNTER — APPOINTMENT (OUTPATIENT)
Dept: NEUROLOGY | Facility: CLINIC | Age: 69
End: 2024-08-29
Payer: MEDICARE

## 2024-08-29 ENCOUNTER — RESULT REVIEW (OUTPATIENT)
Age: 69
End: 2024-08-29

## 2024-08-29 ENCOUNTER — APPOINTMENT (OUTPATIENT)
Dept: HEMATOLOGY ONCOLOGY | Facility: CLINIC | Age: 69
End: 2024-08-29

## 2024-08-29 VITALS
TEMPERATURE: 97.3 F | WEIGHT: 142 LBS | OXYGEN SATURATION: 95 % | HEIGHT: 61 IN | SYSTOLIC BLOOD PRESSURE: 119 MMHG | RESPIRATION RATE: 17 BRPM | BODY MASS INDEX: 26.81 KG/M2 | HEART RATE: 70 BPM | DIASTOLIC BLOOD PRESSURE: 68 MMHG

## 2024-08-29 DIAGNOSIS — G40.909 EPILEPSY, UNSPECIFIED, NOT INTRACTABLE, W/OUT STATUS EPILEPTICUS: ICD-10-CM

## 2024-08-29 DIAGNOSIS — G81.94 HEMIPLEGIA, UNSPECIFIED AFFECTING LEFT NONDOMINANT SIDE: ICD-10-CM

## 2024-08-29 DIAGNOSIS — T38.0X5A DRUG-INDUCED MYOPATHY: ICD-10-CM

## 2024-08-29 DIAGNOSIS — G72.0 DRUG-INDUCED MYOPATHY: ICD-10-CM

## 2024-08-29 LAB
BASOPHILS # BLD AUTO: 0 K/UL — SIGNIFICANT CHANGE UP (ref 0–0.2)
BASOPHILS NFR BLD AUTO: 0.5 % — SIGNIFICANT CHANGE UP (ref 0–2)
EOSINOPHIL # BLD AUTO: 0 K/UL — SIGNIFICANT CHANGE UP (ref 0–0.5)
EOSINOPHIL NFR BLD AUTO: 0 % — SIGNIFICANT CHANGE UP (ref 0–6)
HCT VFR BLD CALC: 40.6 % — SIGNIFICANT CHANGE UP (ref 34.5–45)
HGB BLD-MCNC: 13.5 G/DL — SIGNIFICANT CHANGE UP (ref 11.5–15.5)
LYMPHOCYTES # BLD AUTO: 0.3 K/UL — LOW (ref 1–3.3)
LYMPHOCYTES # BLD AUTO: 2.7 % — LOW (ref 13–44)
MCHC RBC-ENTMCNC: 31.5 PG — SIGNIFICANT CHANGE UP (ref 27–34)
MCHC RBC-ENTMCNC: 33.3 G/DL — SIGNIFICANT CHANGE UP (ref 32–36)
MCV RBC AUTO: 94.6 FL — SIGNIFICANT CHANGE UP (ref 80–100)
MONOCYTES # BLD AUTO: 0.3 K/UL — SIGNIFICANT CHANGE UP (ref 0–0.9)
MONOCYTES NFR BLD AUTO: 3.1 % — SIGNIFICANT CHANGE UP (ref 2–14)
NEUTROPHILS # BLD AUTO: 8.6 K/UL — HIGH (ref 1.8–7.4)
NEUTROPHILS NFR BLD AUTO: 93.6 % — HIGH (ref 43–77)
PLATELET # BLD AUTO: 175 K/UL — SIGNIFICANT CHANGE UP (ref 150–400)
RBC # BLD: 4.29 M/UL — SIGNIFICANT CHANGE UP (ref 3.8–5.2)
RBC # FLD: 12.8 % — SIGNIFICANT CHANGE UP (ref 10.3–14.5)
WBC # BLD: 9.2 K/UL — SIGNIFICANT CHANGE UP (ref 3.8–10.5)
WBC # FLD AUTO: 9.2 K/UL — SIGNIFICANT CHANGE UP (ref 3.8–10.5)

## 2024-08-29 PROCEDURE — G2211 COMPLEX E/M VISIT ADD ON: CPT

## 2024-08-29 PROCEDURE — 99215 OFFICE O/P EST HI 40 MIN: CPT

## 2024-08-29 RX ORDER — TEMOZOLOMIDE 140 MG/1
140 CAPSULE ORAL AT BEDTIME
Qty: 5 | Refills: 0 | Status: ACTIVE | COMMUNITY
Start: 2024-08-29 | End: 1900-01-01

## 2024-08-29 RX ORDER — TEMOZOLOMIDE 100 MG/1
100 CAPSULE ORAL AT BEDTIME
Qty: 5 | Refills: 0 | Status: ACTIVE | COMMUNITY
Start: 2024-08-29 | End: 1900-01-01

## 2024-08-29 RX ORDER — TEMOZOLOMIDE 5 MG/1
5 CAPSULE ORAL AT BEDTIME
Qty: 5 | Refills: 0 | Status: ACTIVE | COMMUNITY
Start: 2024-08-29 | End: 1900-01-01

## 2024-08-29 NOTE — DISCUSSION/SUMMARY
[FreeTextEntry1] : GBM, MGMT methylated s/p resection.  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  Some steroid myopathy.   Discussed adjuvant TMZ cycle 1.     TUMOR:   Prescribed /m2 C1 to begin today.  MRI coordinated for 1mo   BRAIN EDEMA: dex 4.  Will call to taper to 3 next week  EPILEPSY: Keppra 500 bid  SUPPORTIVE: ppi.    HEME: CBC today normal, weekly CBCs reinforced  HEMIPARESIS: improved on my exam, PT/OT  RTC2w

## 2024-08-29 NOTE — DATA REVIEWED
[de-identified] : Pre and post operative MRI brain form 4/24 and 5/24 show resection of right frontal enhancing tumor, with area of DWI medial to resection cavity, 8/24 MRI with increased edema and enhancement in site of prior surgery

## 2024-08-29 NOTE — HISTORY OF PRESENT ILLNESS
[FreeTextEntry1] : NEURO-ONCOLOGY  This 69 year old right handed woman is seen in follow up regarding glioblastoma  She presented with 3-4w of progressive left hand clumsiness and dropping things.  Her gait was a bit off as well.  An enhancing right frontal lesion was resected by Dr. Guaman on 5/3/24, revealing glioblastoma IDH wt, with small cell features.  Caris with MGMT methylated, mutations in CDKN2A/B deletion, TERT promoter, PIK3CA, NF1, MTAP, and TMB 4. Admitted post RT with seizure in setting of increased brain edema.   INTERVAL HISTORY:  Feels proximal muscles weak.  No seizures.  No headaches.  CBC today normal.    PMH:  Afib.  PSH: none SHX:  here with  Bello.  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

## 2024-09-05 ENCOUNTER — APPOINTMENT (OUTPATIENT)
Dept: HEMATOLOGY ONCOLOGY | Facility: CLINIC | Age: 69
End: 2024-09-05

## 2024-09-05 ENCOUNTER — RESULT REVIEW (OUTPATIENT)
Age: 69
End: 2024-09-05

## 2024-09-05 LAB
BASOPHILS # BLD AUTO: 0.1 K/UL — SIGNIFICANT CHANGE UP (ref 0–0.2)
BASOPHILS NFR BLD AUTO: 0.7 % — SIGNIFICANT CHANGE UP (ref 0–2)
EOSINOPHIL # BLD AUTO: 0 K/UL — SIGNIFICANT CHANGE UP (ref 0–0.5)
EOSINOPHIL NFR BLD AUTO: 0 % — SIGNIFICANT CHANGE UP (ref 0–6)
HCT VFR BLD CALC: 40.7 % — SIGNIFICANT CHANGE UP (ref 34.5–45)
HGB BLD-MCNC: 13.5 G/DL — SIGNIFICANT CHANGE UP (ref 11.5–15.5)
LYMPHOCYTES # BLD AUTO: 0.2 K/UL — LOW (ref 1–3.3)
LYMPHOCYTES # BLD AUTO: 2.4 % — LOW (ref 13–44)
MCHC RBC-ENTMCNC: 31.3 PG — SIGNIFICANT CHANGE UP (ref 27–34)
MCHC RBC-ENTMCNC: 33.2 G/DL — SIGNIFICANT CHANGE UP (ref 32–36)
MCV RBC AUTO: 94.3 FL — SIGNIFICANT CHANGE UP (ref 80–100)
MONOCYTES # BLD AUTO: 0.2 K/UL — SIGNIFICANT CHANGE UP (ref 0–0.9)
MONOCYTES NFR BLD AUTO: 3.2 % — SIGNIFICANT CHANGE UP (ref 2–14)
NEUTROPHILS # BLD AUTO: 7.1 K/UL — SIGNIFICANT CHANGE UP (ref 1.8–7.4)
NEUTROPHILS NFR BLD AUTO: 93.6 % — HIGH (ref 43–77)
PLATELET # BLD AUTO: 188 K/UL — SIGNIFICANT CHANGE UP (ref 150–400)
RBC # BLD: 4.32 M/UL — SIGNIFICANT CHANGE UP (ref 3.8–5.2)
RBC # FLD: 13 % — SIGNIFICANT CHANGE UP (ref 10.3–14.5)
WBC # BLD: 7.6 K/UL — SIGNIFICANT CHANGE UP (ref 3.8–10.5)
WBC # FLD AUTO: 7.6 K/UL — SIGNIFICANT CHANGE UP (ref 3.8–10.5)

## 2024-09-06 RX ORDER — DEXAMETHASONE 1 MG/1
1 TABLET ORAL
Qty: 90 | Refills: 0 | Status: ACTIVE | COMMUNITY
Start: 2024-09-06 | End: 1900-01-01

## 2024-09-09 ENCOUNTER — APPOINTMENT (OUTPATIENT)
Dept: RADIATION ONCOLOGY | Facility: CLINIC | Age: 69
End: 2024-09-09
Payer: MEDICARE

## 2024-09-09 ENCOUNTER — NON-APPOINTMENT (OUTPATIENT)
Age: 69
End: 2024-09-09

## 2024-09-09 VITALS
SYSTOLIC BLOOD PRESSURE: 120 MMHG | WEIGHT: 143 LBS | HEART RATE: 69 BPM | OXYGEN SATURATION: 96 % | RESPIRATION RATE: 16 BRPM | DIASTOLIC BLOOD PRESSURE: 71 MMHG | BODY MASS INDEX: 27.02 KG/M2

## 2024-09-09 PROCEDURE — 99214 OFFICE O/P EST MOD 30 MIN: CPT

## 2024-09-09 NOTE — PHYSICAL EXAM
[Cervical Lymph Nodes Enlarged Posterior Bilaterally] : posterior cervical [Cervical Lymph Nodes Enlarged Anterior Bilaterally] : anterior cervical [Supraclavicular Lymph Nodes Enlarged Bilaterally] : supraclavicular [Axillary Lymph Nodes Enlarged Bilaterally] : axillary [Normal] : oriented to person, place and time, the affect was normal, the mood was normal and not anxious [de-identified] : left sided weakness

## 2024-09-09 NOTE — REVIEW OF SYSTEMS
[Fatigue] : fatigue [Muscle Weakness] : muscle weakness [Gait Disturbance] : gait disturbance [Negative] : Allergic/Immunologic [Nausea: Grade 0] : Nausea: Grade 0 [Vomiting: Grade 0] : Vomiting: Grade 0 [Edema Limbs: Grade 0] : Edema Limbs: Grade 0  [Fatigue: Grade 0] : Fatigue: Grade 0 [Localized Edema: Grade 0] : Localized Edema: Grade 0  [Neck Edema: Grade 0] : Neck Edema: Grade 0 [Blurred Vision: Grade 1 - Intervention not indicated] : Blurred Vision: Grade 1 - Intervention not indicated [Alopecia: Grade 1 - Hair loss of <50% of normal for that individual that is not obvious from a distance but only on close inspection; a different hair style may be required to cover the hair loss but it does not require a wig or hair piece to camouflage] : Alopecia: Grade 1 - Hair loss of <50% of normal for that individual that is not obvious from a distance but only on close inspection; a different hair style may be required to cover the hair loss but it does not require a wig or hair piece to camouflage [Skin Hyperpigmentation: Grade 0] : Skin Hyperpigmentation: Grade 0

## 2024-09-09 NOTE — PHYSICAL EXAM
[Cervical Lymph Nodes Enlarged Posterior Bilaterally] : posterior cervical [Cervical Lymph Nodes Enlarged Anterior Bilaterally] : anterior cervical [Supraclavicular Lymph Nodes Enlarged Bilaterally] : supraclavicular [Axillary Lymph Nodes Enlarged Bilaterally] : axillary [Normal] : oriented to person, place and time, the affect was normal, the mood was normal and not anxious [de-identified] : left sided weakness

## 2024-09-09 NOTE — HISTORY OF PRESENT ILLNESS
[FreeTextEntry1] : 69-year-old woman with right frontal lobe glioblastoma, status post resection on 5/3/2024 (WHO grade 4, IDH wild-type, with small cell features).  Completing radiation with temozolomide on 7/29/24 Interval history: admitted [facility] 8/6/24 with LUE jerking lasting for 15 minutes and drooling onset was 1 hour PTA. Patient also noted to have rhythmic motion of the LUE post stopping her Keppra. Ct head: IMPRESSION: Enhancement and perfusion at the surgical site suggesting tumor recurrence. No large vessel occlusion. Consider MRI of the brain for further evaluation. 8/7/24 MRI head: IMPRESSION:   Nodular peripherally enhancing tumor has developed posterior and superior to the operative bed. The extent of nonenhancing infiltrative tumor/vasogenic edema within the right cerebral hemisphere has also substantially increased.  No remote site of disease has developed. discharged 8/8/24   Currently she is on dexamethasone 3 mg daily, is s/p C1 of Temodar and has resumed PT/OT Reports ongoing ataxia using a walker and cane, blurry vision in pm and fatigue  No headaches, nausea, vomiting, diplopia, seizures, pain or weight loss  Care team: neuro Onc Iftikhar Ashley PCP Kiara Stark neuro pratima Guaman

## 2024-09-09 NOTE — HISTORY OF PRESENT ILLNESS
[FreeTextEntry1] : 69-year-old woman with right frontal lobe glioblastoma, status post resection on 5/3/2024 (WHO grade 4, IDH wild-type, with small cell features).  Completing radiation with temozolomide on 7/29/24 Interval history: admitted [facility] 8/6/24 with LUE jerking lasting for 15 minutes and drooling onset was 1 hour PTA. Patient also noted to have rhythmic motion of the LUE post stopping her Keppra. Ct head: IMPRESSION: Enhancement and perfusion at the surgical site suggesting tumor recurrence. No large vessel occlusion. Consider MRI of the brain for further evaluation. 8/7/24 MRI head: IMPRESSION:   Nodular peripherally enhancing tumor has developed posterior and superior to the operative bed. The extent of nonenhancing infiltrative tumor/vasogenic edema within the right cerebral hemisphere has also substantially increased.  No remote site of disease has developed. discharged 8/8/24   Currently she is on dexamethasone 3 mg daily, is s/p C1 of Temodar and has resumed PT/OT Reports ongoing ataxia using a walker and cane, blurry vision in pm and fatigue  No headaches, nausea, vomiting, diplopia, seizures, pain or weight loss  Care team: neuro Onc Iftikhar Ashley PCP Kiara Stark neuro pratima Guaman

## 2024-09-12 ENCOUNTER — APPOINTMENT (OUTPATIENT)
Dept: HEMATOLOGY ONCOLOGY | Facility: CLINIC | Age: 69
End: 2024-09-12

## 2024-09-12 ENCOUNTER — APPOINTMENT (OUTPATIENT)
Dept: NEUROLOGY | Facility: CLINIC | Age: 69
End: 2024-09-12
Payer: MEDICARE

## 2024-09-12 VITALS
SYSTOLIC BLOOD PRESSURE: 104 MMHG | HEART RATE: 80 BPM | BODY MASS INDEX: 27 KG/M2 | HEIGHT: 61 IN | OXYGEN SATURATION: 97 % | DIASTOLIC BLOOD PRESSURE: 68 MMHG | WEIGHT: 143.01 LBS

## 2024-09-12 DIAGNOSIS — G40.909 EPILEPSY, UNSPECIFIED, NOT INTRACTABLE, W/OUT STATUS EPILEPTICUS: ICD-10-CM

## 2024-09-12 DIAGNOSIS — C71.9 MALIGNANT NEOPLASM OF BRAIN, UNSPECIFIED: ICD-10-CM

## 2024-09-12 DIAGNOSIS — G81.94 HEMIPLEGIA, UNSPECIFIED AFFECTING LEFT NONDOMINANT SIDE: ICD-10-CM

## 2024-09-12 PROCEDURE — 99214 OFFICE O/P EST MOD 30 MIN: CPT

## 2024-09-12 NOTE — HISTORY OF PRESENT ILLNESS
[FreeTextEntry1] : NEURO-ONCOLOGY  This 69 year old right handed woman is seen in follow up regarding glioblastoma  She presented with 3-4w of progressive left hand clumsiness and dropping things.  Her gait was a bit off as well.  An enhancing right frontal lesion was resected by Dr. Guaman on 5/3/24, revealing glioblastoma IDH wt, with small cell features.  Caris with MGMT methylated, mutations in CDKN2A/B deletion, TERT promoter, PIK3CA, NF1, MTAP, and TMB 4. Admitted post RT with seizure in setting of increased brain edema.   C1 /m2 8/29/24  INTERVAL HISTORY:  Feels proximal muscles weak.  No seizures.  No headaches. CBC last week normal.  Some fingernail fungus.   PMH:  Afib.  PSH: none SHX:  here with  Bello.  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

## 2024-09-12 NOTE — DATA REVIEWED
[de-identified] : Pre and post operative MRI brain form 4/24 and 5/24 show resection of right frontal enhancing tumor, with area of DWI medial to resection cavity, 8/24 MRI with increased edema and enhancement in site of prior surgery

## 2024-09-12 NOTE — DISCUSSION/SUMMARY
[FreeTextEntry1] : GBM, MGMT methylated s/p resection.  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  Some steroid myopathy.     TUMOR:   MRI in 2w.  C2 to follow as possible   BRAIN EDEMA: dex 3, taper to 2 next week  EPILEPSY: Keppra 500 bid  SUPPORTIVE: ppi.    HEME: CBC today and weekly  HEMIPARESIS: improved on my exam, PT/OT  ONYCHOMYCOSIS declines derm eval, will readdress  RTC2w

## 2024-09-19 ENCOUNTER — RESULT REVIEW (OUTPATIENT)
Age: 69
End: 2024-09-19

## 2024-09-19 ENCOUNTER — APPOINTMENT (OUTPATIENT)
Dept: HEMATOLOGY ONCOLOGY | Facility: CLINIC | Age: 69
End: 2024-09-19

## 2024-09-19 LAB
BASOPHILS # BLD AUTO: 0 K/UL — SIGNIFICANT CHANGE UP (ref 0–0.2)
BASOPHILS NFR BLD AUTO: 0.5 % — SIGNIFICANT CHANGE UP (ref 0–2)
EOSINOPHIL # BLD AUTO: 0 K/UL — SIGNIFICANT CHANGE UP (ref 0–0.5)
EOSINOPHIL NFR BLD AUTO: 0 % — SIGNIFICANT CHANGE UP (ref 0–6)
HCT VFR BLD CALC: 40.9 % — SIGNIFICANT CHANGE UP (ref 34.5–45)
HGB BLD-MCNC: 13.2 G/DL — SIGNIFICANT CHANGE UP (ref 11.5–15.5)
LYMPHOCYTES # BLD AUTO: 0.3 K/UL — LOW (ref 1–3.3)
LYMPHOCYTES # BLD AUTO: 3.5 % — LOW (ref 13–44)
MCHC RBC-ENTMCNC: 31 PG — SIGNIFICANT CHANGE UP (ref 27–34)
MCHC RBC-ENTMCNC: 32.3 G/DL — SIGNIFICANT CHANGE UP (ref 32–36)
MCV RBC AUTO: 96.1 FL — SIGNIFICANT CHANGE UP (ref 80–100)
MONOCYTES # BLD AUTO: 0.4 K/UL — SIGNIFICANT CHANGE UP (ref 0–0.9)
MONOCYTES NFR BLD AUTO: 4.7 % — SIGNIFICANT CHANGE UP (ref 2–14)
NEUTROPHILS # BLD AUTO: 6.9 K/UL — SIGNIFICANT CHANGE UP (ref 1.8–7.4)
NEUTROPHILS NFR BLD AUTO: 91.2 % — HIGH (ref 43–77)
PLATELET # BLD AUTO: 189 K/UL — SIGNIFICANT CHANGE UP (ref 150–400)
RBC # BLD: 4.25 M/UL — SIGNIFICANT CHANGE UP (ref 3.8–5.2)
RBC # FLD: 13.9 % — SIGNIFICANT CHANGE UP (ref 10.3–14.5)
WBC # BLD: 7.6 K/UL — SIGNIFICANT CHANGE UP (ref 3.8–10.5)
WBC # FLD AUTO: 7.6 K/UL — SIGNIFICANT CHANGE UP (ref 3.8–10.5)

## 2024-09-25 ENCOUNTER — NON-APPOINTMENT (OUTPATIENT)
Age: 69
End: 2024-09-25

## 2024-09-26 ENCOUNTER — APPOINTMENT (OUTPATIENT)
Dept: HEMATOLOGY ONCOLOGY | Facility: CLINIC | Age: 69
End: 2024-09-26

## 2024-09-26 ENCOUNTER — APPOINTMENT (OUTPATIENT)
Dept: NEUROLOGY | Facility: CLINIC | Age: 69
End: 2024-09-26
Payer: MEDICARE

## 2024-09-26 ENCOUNTER — RESULT REVIEW (OUTPATIENT)
Age: 69
End: 2024-09-26

## 2024-09-26 ENCOUNTER — OUTPATIENT (OUTPATIENT)
Dept: OUTPATIENT SERVICES | Facility: HOSPITAL | Age: 69
LOS: 1 days | End: 2024-09-26

## 2024-09-26 ENCOUNTER — APPOINTMENT (OUTPATIENT)
Dept: MRI IMAGING | Facility: CLINIC | Age: 69
End: 2024-09-26

## 2024-09-26 VITALS
HEIGHT: 61 IN | BODY MASS INDEX: 27.18 KG/M2 | DIASTOLIC BLOOD PRESSURE: 70 MMHG | RESPIRATION RATE: 16 BRPM | OXYGEN SATURATION: 95 % | WEIGHT: 143.96 LBS | HEART RATE: 76 BPM | SYSTOLIC BLOOD PRESSURE: 120 MMHG | TEMPERATURE: 96.8 F

## 2024-09-26 DIAGNOSIS — G93.6 CEREBRAL EDEMA: ICD-10-CM

## 2024-09-26 DIAGNOSIS — Z87.898 PERSONAL HISTORY OF OTHER SPECIFIED CONDITIONS: Chronic | ICD-10-CM

## 2024-09-26 DIAGNOSIS — G40.909 EPILEPSY, UNSPECIFIED, NOT INTRACTABLE, W/OUT STATUS EPILEPTICUS: ICD-10-CM

## 2024-09-26 DIAGNOSIS — G81.94 HEMIPLEGIA, UNSPECIFIED AFFECTING LEFT NONDOMINANT SIDE: ICD-10-CM

## 2024-09-26 DIAGNOSIS — C71.9 MALIGNANT NEOPLASM OF BRAIN, UNSPECIFIED: ICD-10-CM

## 2024-09-26 LAB
ALBUMIN SERPL ELPH-MCNC: 4.5 G/DL
ALP BLD-CCNC: 48 U/L
ALT SERPL-CCNC: 30 U/L
ANION GAP SERPL CALC-SCNC: 16 MMOL/L
AST SERPL-CCNC: 14 U/L
BASOPHILS # BLD AUTO: 0.1 K/UL — SIGNIFICANT CHANGE UP (ref 0–0.2)
BASOPHILS NFR BLD AUTO: 0.9 % — SIGNIFICANT CHANGE UP (ref 0–2)
BILIRUB SERPL-MCNC: 0.4 MG/DL
BUN SERPL-MCNC: 20 MG/DL
CALCIUM SERPL-MCNC: 9.2 MG/DL
CHLORIDE SERPL-SCNC: 103 MMOL/L
CO2 SERPL-SCNC: 23 MMOL/L
CREAT SERPL-MCNC: 0.69 MG/DL
EGFR: 93 ML/MIN/1.73M2
EOSINOPHIL # BLD AUTO: 0 K/UL — SIGNIFICANT CHANGE UP (ref 0–0.5)
EOSINOPHIL NFR BLD AUTO: 0 % — SIGNIFICANT CHANGE UP (ref 0–6)
GLUCOSE SERPL-MCNC: 121 MG/DL
HCT VFR BLD CALC: 38.8 % — SIGNIFICANT CHANGE UP (ref 34.5–45)
HGB BLD-MCNC: 12.9 G/DL — SIGNIFICANT CHANGE UP (ref 11.5–15.5)
LYMPHOCYTES # BLD AUTO: 0.3 K/UL — LOW (ref 1–3.3)
LYMPHOCYTES # BLD AUTO: 4 % — LOW (ref 13–44)
MCHC RBC-ENTMCNC: 32.2 PG — SIGNIFICANT CHANGE UP (ref 27–34)
MCHC RBC-ENTMCNC: 33.2 G/DL — SIGNIFICANT CHANGE UP (ref 32–36)
MCV RBC AUTO: 96.9 FL — SIGNIFICANT CHANGE UP (ref 80–100)
MONOCYTES # BLD AUTO: 0.4 K/UL — SIGNIFICANT CHANGE UP (ref 0–0.9)
MONOCYTES NFR BLD AUTO: 4.9 % — SIGNIFICANT CHANGE UP (ref 2–14)
NEUTROPHILS # BLD AUTO: 7.4 K/UL — SIGNIFICANT CHANGE UP (ref 1.8–7.4)
NEUTROPHILS NFR BLD AUTO: 90.2 % — HIGH (ref 43–77)
PLATELET # BLD AUTO: 179 K/UL — SIGNIFICANT CHANGE UP (ref 150–400)
POTASSIUM SERPL-SCNC: 4.5 MMOL/L
PROT SERPL-MCNC: 6.3 G/DL
RBC # BLD: 4 M/UL — SIGNIFICANT CHANGE UP (ref 3.8–5.2)
RBC # FLD: 13.9 % — SIGNIFICANT CHANGE UP (ref 10.3–14.5)
SODIUM SERPL-SCNC: 142 MMOL/L
WBC # BLD: 8.2 K/UL — SIGNIFICANT CHANGE UP (ref 3.8–10.5)
WBC # FLD AUTO: 8.2 K/UL — SIGNIFICANT CHANGE UP (ref 3.8–10.5)

## 2024-09-26 PROCEDURE — 99215 OFFICE O/P EST HI 40 MIN: CPT

## 2024-09-26 PROCEDURE — 70553 MRI BRAIN STEM W/O & W/DYE: CPT | Mod: 26

## 2024-09-26 RX ORDER — TEMOZOLOMIDE 5 MG/1
5 CAPSULE ORAL AT BEDTIME
Qty: 5 | Refills: 0 | Status: ACTIVE | COMMUNITY
Start: 2024-09-26 | End: 1900-01-01

## 2024-09-26 RX ORDER — TEMOZOLOMIDE 140 MG/1
140 CAPSULE ORAL
Qty: 5 | Refills: 0 | Status: ACTIVE | COMMUNITY
Start: 2024-09-26 | End: 1900-01-01

## 2024-09-26 RX ORDER — SULFAMETHOXAZOLE AND TRIMETHOPRIM 800; 160 MG/1; MG/1
800-160 TABLET ORAL DAILY
Qty: 12 | Refills: 5 | Status: ACTIVE | COMMUNITY
Start: 2024-09-26 | End: 1900-01-01

## 2024-09-26 RX ORDER — TEMOZOLOMIDE 180 MG/1
180 CAPSULE ORAL
Qty: 5 | Refills: 0 | Status: ACTIVE | COMMUNITY
Start: 2024-09-26 | End: 1900-01-01

## 2024-09-26 NOTE — PHYSICAL EXAM
[FreeTextEntry1] : Exam as below (with documented exceptions in parentheses):  General:  Cushingoid appearing woman well groomed and without deformities. KPS is 69  Mental Status:  Awake, alert and attentive. Oriented to person, place and time. Recent and remote memory intact. Normal concentration. Fluent spontaneous speech with intact naming and repetition. Normal fund of knowledge.    Cranial Nerves: II: Full visual fields. III,IV,VI:Pupils round, reactive to light. Full extraocular movements. No nystagmus. V: Normal bilateral bite, facial sensation. VII: No facial weakness. VIII: Hearing intact. IX,X: Palate midline, intact gag. XI:  Sternocleidomastoids normal. XII: Tongue protrudes midline. No dysarthria.   Motor:  Normal tone, bulk and power throughout including arms and legs, proximal and distal. No pronator drift. No abnormal movements.  (LUE now without drift, mildly impaired FFM, not dropping things today)  Sensation: Normal in arms, legs and trunk to pin, proprioception and vibration. Negative Romberg.   Coordination: No dysmetria or dysdiadochokinesis bilaterally. Normal heel-shin testing.   Gait: Normal including heel and toe walking. Normal station.  (walks with walker, mild LHP)  Reflexes: Normoactive and symmetric throughout. Absent Babinski bilaterally.   Vascular: No peripheral edema/calf tenderness.   Eyes: Funduscopic exam without papilledema (deferred)  Heart: Regular rate and rhythm. Normal s1-s2. No murmurs, rubs or gallops.   Respiratory: Lungs clear to auscultation bilaterally.   Abdomen: Nontender, non-distended. No hepatosplenomegaly. Normal bowel sounds in four quadrants.

## 2024-09-26 NOTE — HISTORY OF PRESENT ILLNESS
[FreeTextEntry1] : NEURO-ONCOLOGY  This 69 year old right handed woman is seen in follow up regarding glioblastoma  She presented with 3-4w of progressive left hand clumsiness and dropping things.  Her gait was a bit off as well.  An enhancing right frontal lesion was resected by Dr. Guaman on 5/3/24, revealing glioblastoma IDH wt, with small cell features.  Caris with MGMT methylated, mutations in CDKN2A/B deletion, TERT promoter, PIK3CA, NF1, MTAP, and TMB 4. Admitted post RT with seizure in setting of increased brain edema.   C1 /m2 8/29/24 C2 /m2 9/27/24  INTERVAL HISTORY:  Clinically feels a bit stronger on lower steroids.  No headaches or seizures.  CBC normal.  MRI brain reviewed today with stable enhancement, extensive edema and hyperperfusion of lesion.    PMH:  Afib.  PSH: none SHX:  here with  Bello.  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

## 2024-09-26 NOTE — DATA REVIEWED
[de-identified] : Pre and post operative MRI brain form 4/24 and 5/24 show resection of right frontal enhancing tumor, with area of DWI medial to resection cavity, 8/24 MRI with increased edema and enhancement in site of prior surgery, stable on 9/24 MRI with increased perfusion

## 2024-09-26 NOTE — DISCUSSION/SUMMARY
[FreeTextEntry1] : GBM, MGMT methylated s/p resection.  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  Some steroid myopathy.   I discussed options including resection (which they do not want), starting bevacizumab, or continued monitoring with TMZ.  They opt for latter.   TUMOR:   Prescribed /m2 to begin ASAP.  New MRI in 1mo coordinated  BRAIN EDEMA: dex 2, hold taper.  prescribed bactrim TIW ppx  EPILEPSY: Keppra 500 bid  SUPPORTIVE: ppi.    HEME: CBC today and weekly  HEMIPARESIS: PT/OT  ONYCHOMYCOSIS declines derm eval, will readdress  RTC4w

## 2024-10-03 ENCOUNTER — RESULT REVIEW (OUTPATIENT)
Age: 69
End: 2024-10-03

## 2024-10-03 ENCOUNTER — APPOINTMENT (OUTPATIENT)
Dept: HEMATOLOGY ONCOLOGY | Facility: CLINIC | Age: 69
End: 2024-10-03

## 2024-10-03 LAB
BASOPHILS # BLD AUTO: 0 K/UL — SIGNIFICANT CHANGE UP (ref 0–0.2)
BASOPHILS NFR BLD AUTO: 0.8 % — SIGNIFICANT CHANGE UP (ref 0–2)
EOSINOPHIL # BLD AUTO: 0 K/UL — SIGNIFICANT CHANGE UP (ref 0–0.5)
EOSINOPHIL NFR BLD AUTO: 0.1 % — SIGNIFICANT CHANGE UP (ref 0–6)
HCT VFR BLD CALC: 38.6 % — SIGNIFICANT CHANGE UP (ref 34.5–45)
HGB BLD-MCNC: 12.8 G/DL — SIGNIFICANT CHANGE UP (ref 11.5–15.5)
LYMPHOCYTES # BLD AUTO: 0.4 K/UL — LOW (ref 1–3.3)
LYMPHOCYTES # BLD AUTO: 7.8 % — LOW (ref 13–44)
MCHC RBC-ENTMCNC: 31.8 PG — SIGNIFICANT CHANGE UP (ref 27–34)
MCHC RBC-ENTMCNC: 33.1 G/DL — SIGNIFICANT CHANGE UP (ref 32–36)
MCV RBC AUTO: 96.2 FL — SIGNIFICANT CHANGE UP (ref 80–100)
MONOCYTES # BLD AUTO: 0.4 K/UL — SIGNIFICANT CHANGE UP (ref 0–0.9)
MONOCYTES NFR BLD AUTO: 6.8 % — SIGNIFICANT CHANGE UP (ref 2–14)
NEUTROPHILS # BLD AUTO: 4.8 K/UL — SIGNIFICANT CHANGE UP (ref 1.8–7.4)
NEUTROPHILS NFR BLD AUTO: 84.4 % — HIGH (ref 43–77)
PLATELET # BLD AUTO: 191 K/UL — SIGNIFICANT CHANGE UP (ref 150–400)
RBC # BLD: 4.01 M/UL — SIGNIFICANT CHANGE UP (ref 3.8–5.2)
RBC # FLD: 13.7 % — SIGNIFICANT CHANGE UP (ref 10.3–14.5)
WBC # BLD: 5.7 K/UL — SIGNIFICANT CHANGE UP (ref 3.8–10.5)
WBC # FLD AUTO: 5.7 K/UL — SIGNIFICANT CHANGE UP (ref 3.8–10.5)

## 2024-10-09 ENCOUNTER — NON-APPOINTMENT (OUTPATIENT)
Age: 69
End: 2024-10-09

## 2024-10-09 ENCOUNTER — APPOINTMENT (OUTPATIENT)
Dept: RADIATION ONCOLOGY | Facility: CLINIC | Age: 69
End: 2024-10-09

## 2024-10-10 ENCOUNTER — APPOINTMENT (OUTPATIENT)
Dept: HEMATOLOGY ONCOLOGY | Facility: CLINIC | Age: 69
End: 2024-10-10

## 2024-10-10 ENCOUNTER — RESULT REVIEW (OUTPATIENT)
Age: 69
End: 2024-10-10

## 2024-10-10 LAB
BASOPHILS # BLD AUTO: 0 K/UL — SIGNIFICANT CHANGE UP (ref 0–0.2)
BASOPHILS NFR BLD AUTO: 0.7 % — SIGNIFICANT CHANGE UP (ref 0–2)
EOSINOPHIL # BLD AUTO: 0 K/UL — SIGNIFICANT CHANGE UP (ref 0–0.5)
EOSINOPHIL NFR BLD AUTO: 0 % — SIGNIFICANT CHANGE UP (ref 0–6)
HCT VFR BLD CALC: 36.1 % — SIGNIFICANT CHANGE UP (ref 34.5–45)
HGB BLD-MCNC: 12 G/DL — SIGNIFICANT CHANGE UP (ref 11.5–15.5)
LYMPHOCYTES # BLD AUTO: 0.3 K/UL — LOW (ref 1–3.3)
LYMPHOCYTES # BLD AUTO: 5.3 % — LOW (ref 13–44)
MCHC RBC-ENTMCNC: 32.5 PG — SIGNIFICANT CHANGE UP (ref 27–34)
MCHC RBC-ENTMCNC: 33.3 G/DL — SIGNIFICANT CHANGE UP (ref 32–36)
MCV RBC AUTO: 97.6 FL — SIGNIFICANT CHANGE UP (ref 80–100)
MONOCYTES # BLD AUTO: 0.3 K/UL — SIGNIFICANT CHANGE UP (ref 0–0.9)
MONOCYTES NFR BLD AUTO: 4.8 % — SIGNIFICANT CHANGE UP (ref 2–14)
NEUTROPHILS # BLD AUTO: 5.4 K/UL — SIGNIFICANT CHANGE UP (ref 1.8–7.4)
NEUTROPHILS NFR BLD AUTO: 89.2 % — HIGH (ref 43–77)
PLATELET # BLD AUTO: 231 K/UL — SIGNIFICANT CHANGE UP (ref 150–400)
RBC # BLD: 3.7 M/UL — LOW (ref 3.8–5.2)
RBC # FLD: 14.1 % — SIGNIFICANT CHANGE UP (ref 10.3–14.5)
WBC # BLD: 6 K/UL — SIGNIFICANT CHANGE UP (ref 3.8–10.5)
WBC # FLD AUTO: 6 K/UL — SIGNIFICANT CHANGE UP (ref 3.8–10.5)

## 2024-10-11 ENCOUNTER — NON-APPOINTMENT (OUTPATIENT)
Age: 69
End: 2024-10-11

## 2024-10-11 ENCOUNTER — APPOINTMENT (OUTPATIENT)
Dept: RADIATION ONCOLOGY | Facility: CLINIC | Age: 69
End: 2024-10-11
Payer: MEDICARE

## 2024-10-11 VITALS
HEART RATE: 97 BPM | WEIGHT: 143 LBS | OXYGEN SATURATION: 98 % | DIASTOLIC BLOOD PRESSURE: 77 MMHG | HEIGHT: 61 IN | RESPIRATION RATE: 16 BRPM | SYSTOLIC BLOOD PRESSURE: 125 MMHG | TEMPERATURE: 98.1 F | BODY MASS INDEX: 27 KG/M2

## 2024-10-11 DIAGNOSIS — C71.9 MALIGNANT NEOPLASM OF BRAIN, UNSPECIFIED: ICD-10-CM

## 2024-10-11 PROCEDURE — 99215 OFFICE O/P EST HI 40 MIN: CPT

## 2024-10-11 PROCEDURE — G2211 COMPLEX E/M VISIT ADD ON: CPT

## 2024-10-13 ENCOUNTER — OUTPATIENT (OUTPATIENT)
Dept: OUTPATIENT SERVICES | Facility: HOSPITAL | Age: 69
LOS: 1 days | Discharge: HOME | End: 2024-10-13

## 2024-10-13 DIAGNOSIS — Z98.890 OTHER SPECIFIED POSTPROCEDURAL STATES: Chronic | ICD-10-CM

## 2024-10-13 DIAGNOSIS — D64.9 ANEMIA, UNSPECIFIED: ICD-10-CM

## 2024-10-13 DIAGNOSIS — Z87.898 PERSONAL HISTORY OF OTHER SPECIFIED CONDITIONS: Chronic | ICD-10-CM

## 2024-10-17 ENCOUNTER — APPOINTMENT (OUTPATIENT)
Dept: HEMATOLOGY ONCOLOGY | Facility: CLINIC | Age: 69
End: 2024-10-17

## 2024-10-17 ENCOUNTER — RESULT REVIEW (OUTPATIENT)
Age: 69
End: 2024-10-17

## 2024-10-17 LAB
BASOPHILS # BLD AUTO: 0 K/UL — SIGNIFICANT CHANGE UP (ref 0–0.2)
BASOPHILS NFR BLD AUTO: 0.5 % — SIGNIFICANT CHANGE UP (ref 0–2)
EOSINOPHIL # BLD AUTO: 0 K/UL — SIGNIFICANT CHANGE UP (ref 0–0.5)
EOSINOPHIL NFR BLD AUTO: 0 % — SIGNIFICANT CHANGE UP (ref 0–6)
HCT VFR BLD CALC: 34.9 % — SIGNIFICANT CHANGE UP (ref 34.5–45)
HGB BLD-MCNC: 11.6 G/DL — SIGNIFICANT CHANGE UP (ref 11.5–15.5)
LYMPHOCYTES # BLD AUTO: 0.3 K/UL — LOW (ref 1–3.3)
LYMPHOCYTES # BLD AUTO: 5.4 % — LOW (ref 13–44)
MCHC RBC-ENTMCNC: 32.5 PG — SIGNIFICANT CHANGE UP (ref 27–34)
MCHC RBC-ENTMCNC: 33.1 G/DL — SIGNIFICANT CHANGE UP (ref 32–36)
MCV RBC AUTO: 98.1 FL — SIGNIFICANT CHANGE UP (ref 80–100)
MONOCYTES # BLD AUTO: 0.3 K/UL — SIGNIFICANT CHANGE UP (ref 0–0.9)
MONOCYTES NFR BLD AUTO: 5.3 % — SIGNIFICANT CHANGE UP (ref 2–14)
NEUTROPHILS # BLD AUTO: 5.5 K/UL — SIGNIFICANT CHANGE UP (ref 1.8–7.4)
NEUTROPHILS NFR BLD AUTO: 88.8 % — HIGH (ref 43–77)
PLATELET # BLD AUTO: 136 K/UL — LOW (ref 150–400)
RBC # BLD: 3.55 M/UL — LOW (ref 3.8–5.2)
RBC # FLD: 14.8 % — HIGH (ref 10.3–14.5)
WBC # BLD: 6.2 K/UL — SIGNIFICANT CHANGE UP (ref 3.8–10.5)
WBC # FLD AUTO: 6.2 K/UL — SIGNIFICANT CHANGE UP (ref 3.8–10.5)

## 2024-10-24 ENCOUNTER — APPOINTMENT (OUTPATIENT)
Dept: HEMATOLOGY ONCOLOGY | Facility: CLINIC | Age: 69
End: 2024-10-24

## 2024-10-24 ENCOUNTER — RESULT REVIEW (OUTPATIENT)
Age: 69
End: 2024-10-24

## 2024-10-24 LAB
BASOPHILS # BLD AUTO: 0 K/UL — SIGNIFICANT CHANGE UP (ref 0–0.2)
BASOPHILS NFR BLD AUTO: 0.2 % — SIGNIFICANT CHANGE UP (ref 0–2)
EOSINOPHIL # BLD AUTO: 0 K/UL — SIGNIFICANT CHANGE UP (ref 0–0.5)
EOSINOPHIL NFR BLD AUTO: 0.1 % — SIGNIFICANT CHANGE UP (ref 0–6)
HCT VFR BLD CALC: 37.2 % — SIGNIFICANT CHANGE UP (ref 34.5–45)
HGB BLD-MCNC: 12.5 G/DL — SIGNIFICANT CHANGE UP (ref 11.5–15.5)
LYMPHOCYTES # BLD AUTO: 0.3 K/UL — LOW (ref 1–3.3)
LYMPHOCYTES # BLD AUTO: 4.4 % — LOW (ref 13–44)
MCHC RBC-ENTMCNC: 33.2 PG — SIGNIFICANT CHANGE UP (ref 27–34)
MCHC RBC-ENTMCNC: 33.8 G/DL — SIGNIFICANT CHANGE UP (ref 32–36)
MCV RBC AUTO: 98.3 FL — SIGNIFICANT CHANGE UP (ref 80–100)
MONOCYTES # BLD AUTO: 0.2 K/UL — SIGNIFICANT CHANGE UP (ref 0–0.9)
MONOCYTES NFR BLD AUTO: 3 % — SIGNIFICANT CHANGE UP (ref 2–14)
NEUTROPHILS # BLD AUTO: 5.8 K/UL — SIGNIFICANT CHANGE UP (ref 1.8–7.4)
NEUTROPHILS NFR BLD AUTO: 92.3 % — HIGH (ref 43–77)
PLATELET # BLD AUTO: 163 K/UL — SIGNIFICANT CHANGE UP (ref 150–400)
RBC # BLD: 3.78 M/UL — LOW (ref 3.8–5.2)
RBC # FLD: 14.5 % — SIGNIFICANT CHANGE UP (ref 10.3–14.5)
WBC # BLD: 6.3 K/UL — SIGNIFICANT CHANGE UP (ref 3.8–10.5)
WBC # FLD AUTO: 6.3 K/UL — SIGNIFICANT CHANGE UP (ref 3.8–10.5)

## 2024-10-31 ENCOUNTER — RESULT REVIEW (OUTPATIENT)
Age: 69
End: 2024-10-31

## 2024-10-31 ENCOUNTER — APPOINTMENT (OUTPATIENT)
Dept: HEMATOLOGY ONCOLOGY | Facility: CLINIC | Age: 69
End: 2024-10-31

## 2024-10-31 ENCOUNTER — APPOINTMENT (OUTPATIENT)
Dept: MRI IMAGING | Facility: CLINIC | Age: 69
End: 2024-10-31

## 2024-10-31 LAB
BASOPHILS # BLD AUTO: 0 K/UL — SIGNIFICANT CHANGE UP (ref 0–0.2)
BASOPHILS NFR BLD AUTO: 0.4 % — SIGNIFICANT CHANGE UP (ref 0–2)
EOSINOPHIL # BLD AUTO: 0 K/UL — SIGNIFICANT CHANGE UP (ref 0–0.5)
EOSINOPHIL NFR BLD AUTO: 0 % — SIGNIFICANT CHANGE UP (ref 0–6)
HCT VFR BLD CALC: 39 % — SIGNIFICANT CHANGE UP (ref 34.5–45)
HGB BLD-MCNC: 13.3 G/DL — SIGNIFICANT CHANGE UP (ref 11.5–15.5)
LYMPHOCYTES # BLD AUTO: 0.5 K/UL — LOW (ref 1–3.3)
LYMPHOCYTES # BLD AUTO: 7.5 % — LOW (ref 13–44)
MCHC RBC-ENTMCNC: 33.5 PG — SIGNIFICANT CHANGE UP (ref 27–34)
MCHC RBC-ENTMCNC: 34.2 G/DL — SIGNIFICANT CHANGE UP (ref 32–36)
MCV RBC AUTO: 97.9 FL — SIGNIFICANT CHANGE UP (ref 80–100)
MONOCYTES # BLD AUTO: 0.5 K/UL — SIGNIFICANT CHANGE UP (ref 0–0.9)
MONOCYTES NFR BLD AUTO: 7.5 % — SIGNIFICANT CHANGE UP (ref 2–14)
NEUTROPHILS # BLD AUTO: 5.5 K/UL — SIGNIFICANT CHANGE UP (ref 1.8–7.4)
NEUTROPHILS NFR BLD AUTO: 84.5 % — HIGH (ref 43–77)
PLATELET # BLD AUTO: 250 K/UL — SIGNIFICANT CHANGE UP (ref 150–400)
RBC # BLD: 3.98 M/UL — SIGNIFICANT CHANGE UP (ref 3.8–5.2)
RBC # FLD: 14 % — SIGNIFICANT CHANGE UP (ref 10.3–14.5)
WBC # BLD: 6.5 K/UL — SIGNIFICANT CHANGE UP (ref 3.8–10.5)
WBC # FLD AUTO: 6.5 K/UL — SIGNIFICANT CHANGE UP (ref 3.8–10.5)

## 2024-11-07 ENCOUNTER — RESULT REVIEW (OUTPATIENT)
Age: 69
End: 2024-11-07

## 2024-11-07 ENCOUNTER — APPOINTMENT (OUTPATIENT)
Dept: HEMATOLOGY ONCOLOGY | Facility: CLINIC | Age: 69
End: 2024-11-07

## 2024-11-07 LAB
ALBUMIN SERPL ELPH-MCNC: 4.3 G/DL
ALP BLD-CCNC: 72 U/L
ALT SERPL-CCNC: 28 U/L
ANION GAP SERPL CALC-SCNC: 14 MMOL/L
AST SERPL-CCNC: 13 U/L
BASOPHILS # BLD AUTO: 0 K/UL — SIGNIFICANT CHANGE UP (ref 0–0.2)
BASOPHILS NFR BLD AUTO: 0.5 % — SIGNIFICANT CHANGE UP (ref 0–2)
BILIRUB SERPL-MCNC: 0.4 MG/DL
BUN SERPL-MCNC: 26 MG/DL
CALCIUM SERPL-MCNC: 9.1 MG/DL
CHLORIDE SERPL-SCNC: 106 MMOL/L
CO2 SERPL-SCNC: 26 MMOL/L
CREAT SERPL-MCNC: 0.47 MG/DL
EGFR: 102 ML/MIN/1.73M2
EOSINOPHIL # BLD AUTO: 0 K/UL — SIGNIFICANT CHANGE UP (ref 0–0.5)
EOSINOPHIL NFR BLD AUTO: 0 % — SIGNIFICANT CHANGE UP (ref 0–6)
GLUCOSE SERPL-MCNC: 157 MG/DL
HCT VFR BLD CALC: 37.9 % — SIGNIFICANT CHANGE UP (ref 34.5–45)
HGB BLD-MCNC: 12.6 G/DL — SIGNIFICANT CHANGE UP (ref 11.5–15.5)
LYMPHOCYTES # BLD AUTO: 0.3 K/UL — LOW (ref 1–3.3)
LYMPHOCYTES # BLD AUTO: 5.2 % — LOW (ref 13–44)
MCHC RBC-ENTMCNC: 33.4 G/DL — SIGNIFICANT CHANGE UP (ref 32–36)
MCHC RBC-ENTMCNC: 33.7 PG — SIGNIFICANT CHANGE UP (ref 27–34)
MCV RBC AUTO: 100.9 FL — HIGH (ref 80–100)
MONOCYTES # BLD AUTO: 0.3 K/UL — SIGNIFICANT CHANGE UP (ref 0–0.9)
MONOCYTES NFR BLD AUTO: 5 % — SIGNIFICANT CHANGE UP (ref 2–14)
NEUTROPHILS # BLD AUTO: 5.6 K/UL — SIGNIFICANT CHANGE UP (ref 1.8–7.4)
NEUTROPHILS NFR BLD AUTO: 89.3 % — HIGH (ref 43–77)
PLATELET # BLD AUTO: 211 K/UL — SIGNIFICANT CHANGE UP (ref 150–400)
POTASSIUM SERPL-SCNC: 3.8 MMOL/L
PROT SERPL-MCNC: 6 G/DL
RBC # BLD: 3.76 M/UL — LOW (ref 3.8–5.2)
RBC # FLD: 13.9 % — SIGNIFICANT CHANGE UP (ref 10.3–14.5)
SODIUM SERPL-SCNC: 146 MMOL/L
WBC # BLD: 6.3 K/UL — SIGNIFICANT CHANGE UP (ref 3.8–10.5)
WBC # FLD AUTO: 6.3 K/UL — SIGNIFICANT CHANGE UP (ref 3.8–10.5)

## 2024-11-12 ENCOUNTER — APPOINTMENT (OUTPATIENT)
Dept: MRI IMAGING | Facility: CLINIC | Age: 69
End: 2024-11-12
Payer: MEDICARE

## 2024-11-12 ENCOUNTER — OUTPATIENT (OUTPATIENT)
Dept: OUTPATIENT SERVICES | Facility: HOSPITAL | Age: 69
LOS: 1 days | End: 2024-11-12
Payer: COMMERCIAL

## 2024-11-12 DIAGNOSIS — G93.6 CEREBRAL EDEMA: ICD-10-CM

## 2024-11-12 DIAGNOSIS — Z87.898 PERSONAL HISTORY OF OTHER SPECIFIED CONDITIONS: Chronic | ICD-10-CM

## 2024-11-12 DIAGNOSIS — Z98.890 OTHER SPECIFIED POSTPROCEDURAL STATES: Chronic | ICD-10-CM

## 2024-11-12 PROCEDURE — A9585: CPT

## 2024-11-12 PROCEDURE — 70553 MRI BRAIN STEM W/O & W/DYE: CPT | Mod: 26

## 2024-11-12 PROCEDURE — 70553 MRI BRAIN STEM W/O & W/DYE: CPT

## 2024-11-14 ENCOUNTER — RESULT REVIEW (OUTPATIENT)
Age: 69
End: 2024-11-14

## 2024-11-14 ENCOUNTER — APPOINTMENT (OUTPATIENT)
Dept: NEUROLOGY | Facility: CLINIC | Age: 69
End: 2024-11-14
Payer: MEDICARE

## 2024-11-14 ENCOUNTER — APPOINTMENT (OUTPATIENT)
Dept: HEMATOLOGY ONCOLOGY | Facility: CLINIC | Age: 69
End: 2024-11-14

## 2024-11-14 VITALS
OXYGEN SATURATION: 98 % | BODY MASS INDEX: 27.01 KG/M2 | SYSTOLIC BLOOD PRESSURE: 143 MMHG | HEART RATE: 74 BPM | TEMPERATURE: 97.3 F | DIASTOLIC BLOOD PRESSURE: 79 MMHG | HEIGHT: 61 IN | WEIGHT: 143.08 LBS

## 2024-11-14 DIAGNOSIS — C71.9 MALIGNANT NEOPLASM OF BRAIN, UNSPECIFIED: ICD-10-CM

## 2024-11-14 DIAGNOSIS — T38.0X5A DRUG-INDUCED MYOPATHY: ICD-10-CM

## 2024-11-14 DIAGNOSIS — G40.909 EPILEPSY, UNSPECIFIED, NOT INTRACTABLE, W/OUT STATUS EPILEPTICUS: ICD-10-CM

## 2024-11-14 DIAGNOSIS — G72.0 DRUG-INDUCED MYOPATHY: ICD-10-CM

## 2024-11-14 DIAGNOSIS — G93.6 CEREBRAL EDEMA: ICD-10-CM

## 2024-11-14 DIAGNOSIS — G81.94 HEMIPLEGIA, UNSPECIFIED AFFECTING LEFT NONDOMINANT SIDE: ICD-10-CM

## 2024-11-14 LAB
BASOPHILS # BLD AUTO: 0 K/UL — SIGNIFICANT CHANGE UP (ref 0–0.2)
BASOPHILS NFR BLD AUTO: 0.2 % — SIGNIFICANT CHANGE UP (ref 0–2)
EOSINOPHIL # BLD AUTO: 0 K/UL — SIGNIFICANT CHANGE UP (ref 0–0.5)
EOSINOPHIL NFR BLD AUTO: 0 % — SIGNIFICANT CHANGE UP (ref 0–6)
HCT VFR BLD CALC: 36.7 % — SIGNIFICANT CHANGE UP (ref 34.5–45)
HGB BLD-MCNC: 12.5 G/DL — SIGNIFICANT CHANGE UP (ref 11.5–15.5)
LYMPHOCYTES # BLD AUTO: 0.4 K/UL — LOW (ref 1–3.3)
LYMPHOCYTES # BLD AUTO: 4 % — LOW (ref 13–44)
MCHC RBC-ENTMCNC: 33.6 PG — SIGNIFICANT CHANGE UP (ref 27–34)
MCHC RBC-ENTMCNC: 33.9 G/DL — SIGNIFICANT CHANGE UP (ref 32–36)
MCV RBC AUTO: 99.1 FL — SIGNIFICANT CHANGE UP (ref 80–100)
MONOCYTES # BLD AUTO: 0.4 K/UL — SIGNIFICANT CHANGE UP (ref 0–0.9)
MONOCYTES NFR BLD AUTO: 4.7 % — SIGNIFICANT CHANGE UP (ref 2–14)
NEUTROPHILS # BLD AUTO: 8.4 K/UL — HIGH (ref 1.8–7.4)
NEUTROPHILS NFR BLD AUTO: 91.1 % — HIGH (ref 43–77)
PLATELET # BLD AUTO: 198 K/UL — SIGNIFICANT CHANGE UP (ref 150–400)
RBC # BLD: 3.71 M/UL — LOW (ref 3.8–5.2)
RBC # FLD: 13.1 % — SIGNIFICANT CHANGE UP (ref 10.3–14.5)
WBC # BLD: 9.2 K/UL — SIGNIFICANT CHANGE UP (ref 3.8–10.5)
WBC # FLD AUTO: 9.2 K/UL — SIGNIFICANT CHANGE UP (ref 3.8–10.5)

## 2024-11-14 PROCEDURE — 99215 OFFICE O/P EST HI 40 MIN: CPT

## 2024-11-14 RX ORDER — TEMOZOLOMIDE 140 MG/1
140 CAPSULE ORAL
Qty: 5 | Refills: 0 | Status: ACTIVE | COMMUNITY
Start: 2024-11-14 | End: 1900-01-01

## 2024-11-14 RX ORDER — TEMOZOLOMIDE 5 MG/1
5 CAPSULE ORAL
Qty: 5 | Refills: 0 | Status: ACTIVE | COMMUNITY
Start: 2024-11-14 | End: 1900-01-01

## 2024-11-14 RX ORDER — TEMOZOLOMIDE 100 MG/1
100 CAPSULE ORAL
Qty: 5 | Refills: 0 | Status: ACTIVE | COMMUNITY
Start: 2024-11-14 | End: 1900-01-01

## 2024-11-21 ENCOUNTER — APPOINTMENT (OUTPATIENT)
Dept: ULTRASOUND IMAGING | Facility: CLINIC | Age: 69
End: 2024-11-21
Payer: MEDICARE

## 2024-11-21 ENCOUNTER — APPOINTMENT (OUTPATIENT)
Dept: CT IMAGING | Facility: CLINIC | Age: 69
End: 2024-11-21
Payer: MEDICARE

## 2024-11-21 ENCOUNTER — RESULT REVIEW (OUTPATIENT)
Age: 69
End: 2024-11-21

## 2024-11-21 ENCOUNTER — APPOINTMENT (OUTPATIENT)
Dept: HEMATOLOGY ONCOLOGY | Facility: CLINIC | Age: 69
End: 2024-11-21

## 2024-11-21 ENCOUNTER — OUTPATIENT (OUTPATIENT)
Dept: OUTPATIENT SERVICES | Facility: HOSPITAL | Age: 69
LOS: 1 days | End: 2024-11-21

## 2024-11-21 DIAGNOSIS — C71.9 MALIGNANT NEOPLASM OF BRAIN, UNSPECIFIED: ICD-10-CM

## 2024-11-21 DIAGNOSIS — Z87.898 PERSONAL HISTORY OF OTHER SPECIFIED CONDITIONS: Chronic | ICD-10-CM

## 2024-11-21 DIAGNOSIS — Z98.890 OTHER SPECIFIED POSTPROCEDURAL STATES: Chronic | ICD-10-CM

## 2024-11-21 LAB
BASOPHILS # BLD AUTO: 0 K/UL — SIGNIFICANT CHANGE UP (ref 0–0.2)
BASOPHILS NFR BLD AUTO: 0.4 % — SIGNIFICANT CHANGE UP (ref 0–2)
EOSINOPHIL # BLD AUTO: 0 K/UL — SIGNIFICANT CHANGE UP (ref 0–0.5)
EOSINOPHIL NFR BLD AUTO: 0.2 % — SIGNIFICANT CHANGE UP (ref 0–6)
HCT VFR BLD CALC: 38.4 % — SIGNIFICANT CHANGE UP (ref 34.5–45)
HGB BLD-MCNC: 13.1 G/DL — SIGNIFICANT CHANGE UP (ref 11.5–15.5)
LYMPHOCYTES # BLD AUTO: 0.3 K/UL — LOW (ref 1–3.3)
LYMPHOCYTES # BLD AUTO: 4.4 % — LOW (ref 13–44)
MCHC RBC-ENTMCNC: 33.5 PG — SIGNIFICANT CHANGE UP (ref 27–34)
MCHC RBC-ENTMCNC: 34.2 G/DL — SIGNIFICANT CHANGE UP (ref 32–36)
MCV RBC AUTO: 97.9 FL — SIGNIFICANT CHANGE UP (ref 80–100)
MONOCYTES # BLD AUTO: 0.3 K/UL — SIGNIFICANT CHANGE UP (ref 0–0.9)
MONOCYTES NFR BLD AUTO: 3.7 % — SIGNIFICANT CHANGE UP (ref 2–14)
NEUTROPHILS # BLD AUTO: 6.9 K/UL — SIGNIFICANT CHANGE UP (ref 1.8–7.4)
NEUTROPHILS NFR BLD AUTO: 91.3 % — HIGH (ref 43–77)
PLATELET # BLD AUTO: 232 K/UL — SIGNIFICANT CHANGE UP (ref 150–400)
RBC # BLD: 3.92 M/UL — SIGNIFICANT CHANGE UP (ref 3.8–5.2)
RBC # FLD: 13.3 % — SIGNIFICANT CHANGE UP (ref 10.3–14.5)
WBC # BLD: 7.5 K/UL — SIGNIFICANT CHANGE UP (ref 3.8–10.5)
WBC # FLD AUTO: 7.5 K/UL — SIGNIFICANT CHANGE UP (ref 3.8–10.5)

## 2024-11-21 PROCEDURE — 70450 CT HEAD/BRAIN W/O DYE: CPT | Mod: 26

## 2024-11-21 PROCEDURE — 93971 EXTREMITY STUDY: CPT | Mod: 26,LT

## 2024-11-21 PROCEDURE — 93971 EXTREMITY STUDY: CPT | Mod: 26,RT

## 2024-11-25 ENCOUNTER — APPOINTMENT (OUTPATIENT)
Age: 69
End: 2024-11-25

## 2024-11-25 ENCOUNTER — RESULT REVIEW (OUTPATIENT)
Age: 69
End: 2024-11-25

## 2024-11-25 ENCOUNTER — APPOINTMENT (OUTPATIENT)
Dept: NEUROLOGY | Facility: CLINIC | Age: 69
End: 2024-11-25
Payer: MEDICARE

## 2024-11-25 VITALS
HEART RATE: 78 BPM | BODY MASS INDEX: 27.08 KG/M2 | HEIGHT: 61 IN | SYSTOLIC BLOOD PRESSURE: 120 MMHG | WEIGHT: 143.41 LBS | TEMPERATURE: 97.3 F | OXYGEN SATURATION: 97 % | DIASTOLIC BLOOD PRESSURE: 70 MMHG

## 2024-11-25 DIAGNOSIS — D49.6 NEOPLASM OF UNSPECIFIED BEHAVIOR OF BRAIN: ICD-10-CM

## 2024-11-25 DIAGNOSIS — T38.0X5A DRUG-INDUCED MYOPATHY: ICD-10-CM

## 2024-11-25 DIAGNOSIS — G72.0 DRUG-INDUCED MYOPATHY: ICD-10-CM

## 2024-11-25 DIAGNOSIS — C71.9 MALIGNANT NEOPLASM OF BRAIN, UNSPECIFIED: ICD-10-CM

## 2024-11-25 DIAGNOSIS — G40.909 EPILEPSY, UNSPECIFIED, NOT INTRACTABLE, W/OUT STATUS EPILEPTICUS: ICD-10-CM

## 2024-11-25 PROCEDURE — G2211 COMPLEX E/M VISIT ADD ON: CPT

## 2024-11-25 PROCEDURE — 99215 OFFICE O/P EST HI 40 MIN: CPT

## 2024-11-26 DIAGNOSIS — C71.9 MALIGNANT NEOPLASM OF BRAIN, UNSPECIFIED: ICD-10-CM

## 2024-11-26 DIAGNOSIS — Z51.11 ENCOUNTER FOR ANTINEOPLASTIC CHEMOTHERAPY: ICD-10-CM

## 2024-11-26 LAB
ALBUMIN SERPL ELPH-MCNC: 4.2 G/DL — SIGNIFICANT CHANGE UP (ref 3.3–5)
ALP SERPL-CCNC: 73 U/L — SIGNIFICANT CHANGE UP (ref 40–120)
ALT FLD-CCNC: 35 U/L — SIGNIFICANT CHANGE UP (ref 10–45)
ANION GAP SERPL CALC-SCNC: 12 MMOL/L — SIGNIFICANT CHANGE UP (ref 5–17)
AST SERPL-CCNC: 20 U/L — SIGNIFICANT CHANGE UP (ref 10–40)
BILIRUB SERPL-MCNC: 0.4 MG/DL — SIGNIFICANT CHANGE UP (ref 0.2–1.2)
BUN SERPL-MCNC: 24 MG/DL — HIGH (ref 7–23)
CALCIUM SERPL-MCNC: 9.4 MG/DL — SIGNIFICANT CHANGE UP (ref 8.4–10.5)
CHLORIDE SERPL-SCNC: 107 MMOL/L — SIGNIFICANT CHANGE UP (ref 96–108)
CO2 SERPL-SCNC: 26 MMOL/L — SIGNIFICANT CHANGE UP (ref 22–31)
CREAT ?TM UR-MCNC: 90 MG/DL — SIGNIFICANT CHANGE UP
CREAT SERPL-MCNC: 0.51 MG/DL — SIGNIFICANT CHANGE UP (ref 0.5–1.3)
EGFR: 100 ML/MIN/1.73M2 — SIGNIFICANT CHANGE UP
GLUCOSE SERPL-MCNC: 110 MG/DL — HIGH (ref 70–99)
POTASSIUM SERPL-MCNC: 4.1 MMOL/L — SIGNIFICANT CHANGE UP (ref 3.5–5.3)
POTASSIUM SERPL-SCNC: 4.1 MMOL/L — SIGNIFICANT CHANGE UP (ref 3.5–5.3)
PROT ?TM UR-MCNC: 13 MG/DL — HIGH (ref 0–12)
PROT SERPL-MCNC: 6.3 G/DL — SIGNIFICANT CHANGE UP (ref 6–8.3)
PROT/CREAT UR-RTO: 0.1 RATIO — SIGNIFICANT CHANGE UP (ref 0–0.2)
SODIUM SERPL-SCNC: 144 MMOL/L — SIGNIFICANT CHANGE UP (ref 135–145)

## 2024-11-27 ENCOUNTER — APPOINTMENT (OUTPATIENT)
Dept: HEMATOLOGY ONCOLOGY | Facility: CLINIC | Age: 69
End: 2024-11-27

## 2024-12-05 ENCOUNTER — APPOINTMENT (OUTPATIENT)
Dept: NEUROLOGY | Facility: CLINIC | Age: 69
End: 2024-12-05

## 2024-12-05 ENCOUNTER — RESULT REVIEW (OUTPATIENT)
Age: 69
End: 2024-12-05

## 2024-12-05 ENCOUNTER — APPOINTMENT (OUTPATIENT)
Dept: HEMATOLOGY ONCOLOGY | Facility: CLINIC | Age: 69
End: 2024-12-05

## 2024-12-05 LAB
BASOPHILS # BLD AUTO: 0 K/UL — SIGNIFICANT CHANGE UP (ref 0–0.2)
BASOPHILS NFR BLD AUTO: 0.2 % — SIGNIFICANT CHANGE UP (ref 0–2)
EOSINOPHIL # BLD AUTO: 0 K/UL — SIGNIFICANT CHANGE UP (ref 0–0.5)
EOSINOPHIL NFR BLD AUTO: 0 % — SIGNIFICANT CHANGE UP (ref 0–6)
HCT VFR BLD CALC: 38.8 % — SIGNIFICANT CHANGE UP (ref 34.5–45)
HGB BLD-MCNC: 12.9 G/DL — SIGNIFICANT CHANGE UP (ref 11.5–15.5)
LYMPHOCYTES # BLD AUTO: 0.3 K/UL — LOW (ref 1–3.3)
LYMPHOCYTES # BLD AUTO: 3.8 % — LOW (ref 13–44)
MCHC RBC-ENTMCNC: 33.2 G/DL — SIGNIFICANT CHANGE UP (ref 32–36)
MCHC RBC-ENTMCNC: 34.2 PG — HIGH (ref 27–34)
MCV RBC AUTO: 103 FL — HIGH (ref 80–100)
MONOCYTES # BLD AUTO: 0.4 K/UL — SIGNIFICANT CHANGE UP (ref 0–0.9)
MONOCYTES NFR BLD AUTO: 4.6 % — SIGNIFICANT CHANGE UP (ref 2–14)
NEUTROPHILS # BLD AUTO: 7.4 K/UL — SIGNIFICANT CHANGE UP (ref 1.8–7.4)
NEUTROPHILS NFR BLD AUTO: 91.3 % — HIGH (ref 43–77)
PLATELET # BLD AUTO: 234 K/UL — SIGNIFICANT CHANGE UP (ref 150–400)
RBC # BLD: 3.76 M/UL — LOW (ref 3.8–5.2)
RBC # FLD: 13 % — SIGNIFICANT CHANGE UP (ref 10.3–14.5)
WBC # BLD: 8.1 K/UL — SIGNIFICANT CHANGE UP (ref 3.8–10.5)
WBC # FLD AUTO: 8.1 K/UL — SIGNIFICANT CHANGE UP (ref 3.8–10.5)

## 2024-12-12 ENCOUNTER — APPOINTMENT (OUTPATIENT)
Dept: HEMATOLOGY ONCOLOGY | Facility: CLINIC | Age: 69
End: 2024-12-12

## 2024-12-12 ENCOUNTER — RESULT REVIEW (OUTPATIENT)
Age: 69
End: 2024-12-12

## 2024-12-12 LAB
BASOPHILS # BLD AUTO: 0 K/UL — SIGNIFICANT CHANGE UP (ref 0–0.2)
BASOPHILS NFR BLD AUTO: 0.6 % — SIGNIFICANT CHANGE UP (ref 0–2)
EOSINOPHIL # BLD AUTO: 0 K/UL — SIGNIFICANT CHANGE UP (ref 0–0.5)
EOSINOPHIL NFR BLD AUTO: 0 % — SIGNIFICANT CHANGE UP (ref 0–6)
HCT VFR BLD CALC: 38.5 % — SIGNIFICANT CHANGE UP (ref 34.5–45)
HGB BLD-MCNC: 13 G/DL — SIGNIFICANT CHANGE UP (ref 11.5–15.5)
LYMPHOCYTES # BLD AUTO: 0.2 K/UL — LOW (ref 1–3.3)
LYMPHOCYTES # BLD AUTO: 3.6 % — LOW (ref 13–44)
MCHC RBC-ENTMCNC: 33.9 G/DL — SIGNIFICANT CHANGE UP (ref 32–36)
MCHC RBC-ENTMCNC: 34.2 PG — HIGH (ref 27–34)
MCV RBC AUTO: 101.1 FL — HIGH (ref 80–100)
MONOCYTES # BLD AUTO: 0.3 K/UL — SIGNIFICANT CHANGE UP (ref 0–0.9)
MONOCYTES NFR BLD AUTO: 4.8 % — SIGNIFICANT CHANGE UP (ref 2–14)
NEUTROPHILS # BLD AUTO: 6.2 K/UL — SIGNIFICANT CHANGE UP (ref 1.8–7.4)
NEUTROPHILS NFR BLD AUTO: 91 % — HIGH (ref 43–77)
PLATELET # BLD AUTO: 218 K/UL — SIGNIFICANT CHANGE UP (ref 150–400)
RBC # BLD: 3.81 M/UL — SIGNIFICANT CHANGE UP (ref 3.8–5.2)
RBC # FLD: 12.1 % — SIGNIFICANT CHANGE UP (ref 10.3–14.5)
WBC # BLD: 6.8 K/UL — SIGNIFICANT CHANGE UP (ref 3.8–10.5)
WBC # FLD AUTO: 6.8 K/UL — SIGNIFICANT CHANGE UP (ref 3.8–10.5)

## 2024-12-18 ENCOUNTER — OUTPATIENT (OUTPATIENT)
Dept: OUTPATIENT SERVICES | Facility: HOSPITAL | Age: 69
LOS: 1 days | Discharge: HOME | End: 2024-12-18

## 2024-12-18 DIAGNOSIS — Z87.898 PERSONAL HISTORY OF OTHER SPECIFIED CONDITIONS: Chronic | ICD-10-CM

## 2024-12-18 DIAGNOSIS — C71.9 MALIGNANT NEOPLASM OF BRAIN, UNSPECIFIED: ICD-10-CM

## 2024-12-18 DIAGNOSIS — Z98.890 OTHER SPECIFIED POSTPROCEDURAL STATES: Chronic | ICD-10-CM

## 2024-12-19 ENCOUNTER — RESULT REVIEW (OUTPATIENT)
Age: 69
End: 2024-12-19

## 2024-12-19 ENCOUNTER — APPOINTMENT (OUTPATIENT)
Dept: NEUROLOGY | Facility: CLINIC | Age: 69
End: 2024-12-19
Payer: MEDICARE

## 2024-12-19 ENCOUNTER — APPOINTMENT (OUTPATIENT)
Age: 69
End: 2024-12-19

## 2024-12-19 ENCOUNTER — APPOINTMENT (OUTPATIENT)
Dept: HEMATOLOGY ONCOLOGY | Facility: CLINIC | Age: 69
End: 2024-12-19

## 2024-12-19 DIAGNOSIS — G81.94 HEMIPLEGIA, UNSPECIFIED AFFECTING LEFT NONDOMINANT SIDE: ICD-10-CM

## 2024-12-19 DIAGNOSIS — G40.909 EPILEPSY, UNSPECIFIED, NOT INTRACTABLE, W/OUT STATUS EPILEPTICUS: ICD-10-CM

## 2024-12-19 DIAGNOSIS — G72.0 DRUG-INDUCED MYOPATHY: ICD-10-CM

## 2024-12-19 DIAGNOSIS — T38.0X5A DRUG-INDUCED MYOPATHY: ICD-10-CM

## 2024-12-19 DIAGNOSIS — C71.9 MALIGNANT NEOPLASM OF BRAIN, UNSPECIFIED: ICD-10-CM

## 2024-12-19 LAB
BASOPHILS # BLD AUTO: 0 K/UL — SIGNIFICANT CHANGE UP (ref 0–0.2)
BASOPHILS NFR BLD AUTO: 0.1 % — SIGNIFICANT CHANGE UP (ref 0–2)
EOSINOPHIL # BLD AUTO: 0 K/UL — SIGNIFICANT CHANGE UP (ref 0–0.5)
EOSINOPHIL NFR BLD AUTO: 0.1 % — SIGNIFICANT CHANGE UP (ref 0–6)
HCT VFR BLD CALC: 38.9 % — SIGNIFICANT CHANGE UP (ref 34.5–45)
HGB BLD-MCNC: 13.1 G/DL — SIGNIFICANT CHANGE UP (ref 11.5–15.5)
LYMPHOCYTES # BLD AUTO: 0.2 K/UL — LOW (ref 1–3.3)
LYMPHOCYTES # BLD AUTO: 4.4 % — LOW (ref 13–44)
MCHC RBC-ENTMCNC: 33.4 PG — SIGNIFICANT CHANGE UP (ref 27–34)
MCHC RBC-ENTMCNC: 33.6 G/DL — SIGNIFICANT CHANGE UP (ref 32–36)
MCV RBC AUTO: 99.5 FL — SIGNIFICANT CHANGE UP (ref 80–100)
MONOCYTES # BLD AUTO: 0.2 K/UL — SIGNIFICANT CHANGE UP (ref 0–0.9)
MONOCYTES NFR BLD AUTO: 3.5 % — SIGNIFICANT CHANGE UP (ref 2–14)
NEUTROPHILS # BLD AUTO: 5 K/UL — SIGNIFICANT CHANGE UP (ref 1.8–7.4)
NEUTROPHILS NFR BLD AUTO: 92 % — HIGH (ref 43–77)
PLATELET # BLD AUTO: 231 K/UL — SIGNIFICANT CHANGE UP (ref 150–400)
RBC # BLD: 3.91 M/UL — SIGNIFICANT CHANGE UP (ref 3.8–5.2)
RBC # FLD: 12.3 % — SIGNIFICANT CHANGE UP (ref 10.3–14.5)
WBC # BLD: 5.5 K/UL — SIGNIFICANT CHANGE UP (ref 3.8–10.5)
WBC # FLD AUTO: 5.5 K/UL — SIGNIFICANT CHANGE UP (ref 3.8–10.5)

## 2024-12-19 PROCEDURE — 99215 OFFICE O/P EST HI 40 MIN: CPT

## 2024-12-20 ENCOUNTER — APPOINTMENT (OUTPATIENT)
Dept: NEUROLOGY | Facility: CLINIC | Age: 69
End: 2024-12-20

## 2024-12-20 DIAGNOSIS — Z51.11 ENCOUNTER FOR ANTINEOPLASTIC CHEMOTHERAPY: ICD-10-CM

## 2024-12-20 LAB
ALBUMIN SERPL ELPH-MCNC: 4.2 G/DL — SIGNIFICANT CHANGE UP (ref 3.3–5)
ALP SERPL-CCNC: 69 U/L — SIGNIFICANT CHANGE UP (ref 40–120)
ALT FLD-CCNC: 27 U/L — SIGNIFICANT CHANGE UP (ref 10–45)
ANION GAP SERPL CALC-SCNC: 19 MMOL/L — HIGH (ref 5–17)
AST SERPL-CCNC: 21 U/L — SIGNIFICANT CHANGE UP (ref 10–40)
BILIRUB SERPL-MCNC: 0.3 MG/DL — SIGNIFICANT CHANGE UP (ref 0.2–1.2)
BUN SERPL-MCNC: 20 MG/DL — SIGNIFICANT CHANGE UP (ref 7–23)
CALCIUM SERPL-MCNC: 9.1 MG/DL — SIGNIFICANT CHANGE UP (ref 8.4–10.5)
CHLORIDE SERPL-SCNC: 104 MMOL/L — SIGNIFICANT CHANGE UP (ref 96–108)
CO2 SERPL-SCNC: 22 MMOL/L — SIGNIFICANT CHANGE UP (ref 22–31)
CREAT ?TM UR-MCNC: 29 MG/DL — SIGNIFICANT CHANGE UP
CREAT SERPL-MCNC: 0.5 MG/DL — SIGNIFICANT CHANGE UP (ref 0.5–1.3)
EGFR: 101 ML/MIN/1.73M2 — SIGNIFICANT CHANGE UP
GLUCOSE SERPL-MCNC: 208 MG/DL — HIGH (ref 70–99)
POTASSIUM SERPL-MCNC: 3.7 MMOL/L — SIGNIFICANT CHANGE UP (ref 3.5–5.3)
POTASSIUM SERPL-SCNC: 3.7 MMOL/L — SIGNIFICANT CHANGE UP (ref 3.5–5.3)
PROT ?TM UR-MCNC: 4 MG/DL — SIGNIFICANT CHANGE UP (ref 0–12)
PROT SERPL-MCNC: 6.4 G/DL — SIGNIFICANT CHANGE UP (ref 6–8.3)
PROT/CREAT UR-RTO: 0.2 RATIO — SIGNIFICANT CHANGE UP (ref 0–0.2)
SODIUM SERPL-SCNC: 144 MMOL/L — SIGNIFICANT CHANGE UP (ref 135–145)

## 2024-12-26 ENCOUNTER — APPOINTMENT (OUTPATIENT)
Dept: HEMATOLOGY ONCOLOGY | Facility: CLINIC | Age: 69
End: 2024-12-26

## 2024-12-26 ENCOUNTER — RESULT REVIEW (OUTPATIENT)
Age: 69
End: 2024-12-26

## 2024-12-26 LAB
BASOPHILS # BLD AUTO: 0 K/UL — SIGNIFICANT CHANGE UP (ref 0–0.2)
BASOPHILS NFR BLD AUTO: 0.4 % — SIGNIFICANT CHANGE UP (ref 0–2)
EOSINOPHIL # BLD AUTO: 0 K/UL — SIGNIFICANT CHANGE UP (ref 0–0.5)
EOSINOPHIL NFR BLD AUTO: 0.1 % — SIGNIFICANT CHANGE UP (ref 0–6)
HCT VFR BLD CALC: 39.4 % — SIGNIFICANT CHANGE UP (ref 34.5–45)
HGB BLD-MCNC: 13.1 G/DL — SIGNIFICANT CHANGE UP (ref 11.5–15.5)
LYMPHOCYTES # BLD AUTO: 0.3 K/UL — LOW (ref 1–3.3)
LYMPHOCYTES # BLD AUTO: 5.7 % — LOW (ref 13–44)
MCHC RBC-ENTMCNC: 33.2 G/DL — SIGNIFICANT CHANGE UP (ref 32–36)
MCHC RBC-ENTMCNC: 33.6 PG — SIGNIFICANT CHANGE UP (ref 27–34)
MCV RBC AUTO: 101.3 FL — HIGH (ref 80–100)
MONOCYTES # BLD AUTO: 0.3 K/UL — SIGNIFICANT CHANGE UP (ref 0–0.9)
MONOCYTES NFR BLD AUTO: 5.8 % — SIGNIFICANT CHANGE UP (ref 2–14)
NEUTROPHILS # BLD AUTO: 4.8 K/UL — SIGNIFICANT CHANGE UP (ref 1.8–7.4)
NEUTROPHILS NFR BLD AUTO: 87.9 % — HIGH (ref 43–77)
PLATELET # BLD AUTO: 206 K/UL — SIGNIFICANT CHANGE UP (ref 150–400)
RBC # BLD: 3.89 M/UL — SIGNIFICANT CHANGE UP (ref 3.8–5.2)
RBC # FLD: 11.8 % — SIGNIFICANT CHANGE UP (ref 10.3–14.5)
WBC # BLD: 5.4 K/UL — SIGNIFICANT CHANGE UP (ref 3.8–10.5)
WBC # FLD AUTO: 5.4 K/UL — SIGNIFICANT CHANGE UP (ref 3.8–10.5)

## 2024-12-28 NOTE — ED ADULT TRIAGE NOTE - BEFAST LAST WELL KNOWN
Pt to ED a&o4 c/o left sided chest pain associated with nausea/vomiting/loss of appetite x 1 week. Intermittent SOB-no labored or increased work of breathing noted in triage. Denies fever/urinary symptoms.    Known

## 2025-01-02 ENCOUNTER — RESULT REVIEW (OUTPATIENT)
Age: 70
End: 2025-01-02

## 2025-01-02 ENCOUNTER — APPOINTMENT (OUTPATIENT)
Dept: HEMATOLOGY ONCOLOGY | Facility: CLINIC | Age: 70
End: 2025-01-02

## 2025-01-02 LAB
BASOPHILS # BLD AUTO: 0 K/UL — SIGNIFICANT CHANGE UP (ref 0–0.2)
BASOPHILS NFR BLD AUTO: 0.3 % — SIGNIFICANT CHANGE UP (ref 0–2)
EOSINOPHIL # BLD AUTO: 0 K/UL — SIGNIFICANT CHANGE UP (ref 0–0.5)
EOSINOPHIL NFR BLD AUTO: 0 % — SIGNIFICANT CHANGE UP (ref 0–6)
HCT VFR BLD CALC: 40.9 % — SIGNIFICANT CHANGE UP (ref 34.5–45)
HGB BLD-MCNC: 13.4 G/DL — SIGNIFICANT CHANGE UP (ref 11.5–15.5)
LYMPHOCYTES # BLD AUTO: 0.4 K/UL — LOW (ref 1–3.3)
LYMPHOCYTES # BLD AUTO: 6.4 % — LOW (ref 13–44)
MCHC RBC-ENTMCNC: 32.8 G/DL — SIGNIFICANT CHANGE UP (ref 32–36)
MCHC RBC-ENTMCNC: 33.5 PG — SIGNIFICANT CHANGE UP (ref 27–34)
MCV RBC AUTO: 102 FL — HIGH (ref 80–100)
MONOCYTES # BLD AUTO: 0.4 K/UL — SIGNIFICANT CHANGE UP (ref 0–0.9)
MONOCYTES NFR BLD AUTO: 7.3 % — SIGNIFICANT CHANGE UP (ref 2–14)
NEUTROPHILS # BLD AUTO: 5.1 K/UL — SIGNIFICANT CHANGE UP (ref 1.8–7.4)
NEUTROPHILS NFR BLD AUTO: 85.9 % — HIGH (ref 43–77)
PLATELET # BLD AUTO: 226 K/UL — SIGNIFICANT CHANGE UP (ref 150–400)
RBC # BLD: 4 M/UL — SIGNIFICANT CHANGE UP (ref 3.8–5.2)
RBC # FLD: 11.9 % — SIGNIFICANT CHANGE UP (ref 10.3–14.5)
WBC # BLD: 6 K/UL — SIGNIFICANT CHANGE UP (ref 3.8–10.5)
WBC # FLD AUTO: 6 K/UL — SIGNIFICANT CHANGE UP (ref 3.8–10.5)

## 2025-01-09 ENCOUNTER — APPOINTMENT (OUTPATIENT)
Dept: NEUROLOGY | Facility: CLINIC | Age: 70
End: 2025-01-09
Payer: MEDICARE

## 2025-01-09 ENCOUNTER — RESULT REVIEW (OUTPATIENT)
Age: 70
End: 2025-01-09

## 2025-01-09 ENCOUNTER — APPOINTMENT (OUTPATIENT)
Age: 70
End: 2025-01-09

## 2025-01-09 DIAGNOSIS — G81.94 HEMIPLEGIA, UNSPECIFIED AFFECTING LEFT NONDOMINANT SIDE: ICD-10-CM

## 2025-01-09 DIAGNOSIS — C71.9 MALIGNANT NEOPLASM OF BRAIN, UNSPECIFIED: ICD-10-CM

## 2025-01-09 DIAGNOSIS — G40.909 EPILEPSY, UNSPECIFIED, NOT INTRACTABLE, W/OUT STATUS EPILEPTICUS: ICD-10-CM

## 2025-01-09 LAB
BASOPHILS # BLD AUTO: 0 K/UL — SIGNIFICANT CHANGE UP (ref 0–0.2)
BASOPHILS NFR BLD AUTO: 0.5 % — SIGNIFICANT CHANGE UP (ref 0–2)
EOSINOPHIL # BLD AUTO: 0 K/UL — SIGNIFICANT CHANGE UP (ref 0–0.5)
EOSINOPHIL NFR BLD AUTO: 0.1 % — SIGNIFICANT CHANGE UP (ref 0–6)
HCT VFR BLD CALC: 40.6 % — SIGNIFICANT CHANGE UP (ref 34.5–45)
HGB BLD-MCNC: 13.5 G/DL — SIGNIFICANT CHANGE UP (ref 11.5–15.5)
LYMPHOCYTES # BLD AUTO: 0.3 K/UL — LOW (ref 1–3.3)
LYMPHOCYTES # BLD AUTO: 6.1 % — LOW (ref 13–44)
MCHC RBC-ENTMCNC: 33.2 G/DL — SIGNIFICANT CHANGE UP (ref 32–36)
MCHC RBC-ENTMCNC: 33.2 PG — SIGNIFICANT CHANGE UP (ref 27–34)
MCV RBC AUTO: 99.7 FL — SIGNIFICANT CHANGE UP (ref 80–100)
MONOCYTES # BLD AUTO: 0.3 K/UL — SIGNIFICANT CHANGE UP (ref 0–0.9)
MONOCYTES NFR BLD AUTO: 6.5 % — SIGNIFICANT CHANGE UP (ref 2–14)
NEUTROPHILS # BLD AUTO: 4.3 K/UL — SIGNIFICANT CHANGE UP (ref 1.8–7.4)
NEUTROPHILS NFR BLD AUTO: 86.8 % — HIGH (ref 43–77)
PLATELET # BLD AUTO: 289 K/UL — SIGNIFICANT CHANGE UP (ref 150–400)
RBC # BLD: 4.07 M/UL — SIGNIFICANT CHANGE UP (ref 3.8–5.2)
RBC # FLD: 12.7 % — SIGNIFICANT CHANGE UP (ref 10.3–14.5)
WBC # BLD: 5 K/UL — SIGNIFICANT CHANGE UP (ref 3.8–10.5)
WBC # FLD AUTO: 5 K/UL — SIGNIFICANT CHANGE UP (ref 3.8–10.5)

## 2025-01-09 PROCEDURE — 99215 OFFICE O/P EST HI 40 MIN: CPT

## 2025-01-10 LAB
ALBUMIN SERPL ELPH-MCNC: 4.3 G/DL — SIGNIFICANT CHANGE UP (ref 3.3–5)
ALP SERPL-CCNC: 53 U/L — SIGNIFICANT CHANGE UP (ref 40–120)
ALT FLD-CCNC: 27 U/L — SIGNIFICANT CHANGE UP (ref 10–45)
ANION GAP SERPL CALC-SCNC: 19 MMOL/L — HIGH (ref 5–17)
AST SERPL-CCNC: 21 U/L — SIGNIFICANT CHANGE UP (ref 10–40)
BILIRUB SERPL-MCNC: <0.2 MG/DL — SIGNIFICANT CHANGE UP (ref 0.2–1.2)
BUN SERPL-MCNC: 19 MG/DL — SIGNIFICANT CHANGE UP (ref 7–23)
CALCIUM SERPL-MCNC: 9.6 MG/DL — SIGNIFICANT CHANGE UP (ref 8.4–10.5)
CHLORIDE SERPL-SCNC: 105 MMOL/L — SIGNIFICANT CHANGE UP (ref 96–108)
CO2 SERPL-SCNC: 21 MMOL/L — LOW (ref 22–31)
CREAT ?TM UR-MCNC: 59 MG/DL — SIGNIFICANT CHANGE UP
CREAT SERPL-MCNC: 0.55 MG/DL — SIGNIFICANT CHANGE UP (ref 0.5–1.3)
EGFR: 99 ML/MIN/1.73M2 — SIGNIFICANT CHANGE UP
GLUCOSE SERPL-MCNC: 126 MG/DL — HIGH (ref 70–99)
POTASSIUM SERPL-MCNC: 4 MMOL/L — SIGNIFICANT CHANGE UP (ref 3.5–5.3)
POTASSIUM SERPL-SCNC: 4 MMOL/L — SIGNIFICANT CHANGE UP (ref 3.5–5.3)
PROT ?TM UR-MCNC: 7 MG/DL — SIGNIFICANT CHANGE UP (ref 0–12)
PROT SERPL-MCNC: 6.6 G/DL — SIGNIFICANT CHANGE UP (ref 6–8.3)
PROT/CREAT UR-RTO: 0.1 RATIO — SIGNIFICANT CHANGE UP (ref 0–0.2)
SODIUM SERPL-SCNC: 145 MMOL/L — SIGNIFICANT CHANGE UP (ref 135–145)

## 2025-01-16 ENCOUNTER — RESULT REVIEW (OUTPATIENT)
Age: 70
End: 2025-01-16

## 2025-01-16 ENCOUNTER — APPOINTMENT (OUTPATIENT)
Dept: HEMATOLOGY ONCOLOGY | Facility: CLINIC | Age: 70
End: 2025-01-16

## 2025-01-16 LAB
BASOPHILS # BLD AUTO: 0.1 K/UL — SIGNIFICANT CHANGE UP (ref 0–0.2)
BASOPHILS NFR BLD AUTO: 0.8 % — SIGNIFICANT CHANGE UP (ref 0–2)
EOSINOPHIL # BLD AUTO: 0 K/UL — SIGNIFICANT CHANGE UP (ref 0–0.5)
EOSINOPHIL NFR BLD AUTO: 0 % — SIGNIFICANT CHANGE UP (ref 0–6)
HCT VFR BLD CALC: 39.4 % — SIGNIFICANT CHANGE UP (ref 34.5–45)
HGB BLD-MCNC: 13.2 G/DL — SIGNIFICANT CHANGE UP (ref 11.5–15.5)
LYMPHOCYTES # BLD AUTO: 0.6 K/UL — LOW (ref 1–3.3)
LYMPHOCYTES # BLD AUTO: 8.4 % — LOW (ref 13–44)
MCHC RBC-ENTMCNC: 33.4 G/DL — SIGNIFICANT CHANGE UP (ref 32–36)
MCHC RBC-ENTMCNC: 33.6 PG — SIGNIFICANT CHANGE UP (ref 27–34)
MCV RBC AUTO: 100.6 FL — HIGH (ref 80–100)
MONOCYTES # BLD AUTO: 0.5 K/UL — SIGNIFICANT CHANGE UP (ref 0–0.9)
MONOCYTES NFR BLD AUTO: 7.1 % — SIGNIFICANT CHANGE UP (ref 2–14)
NEUTROPHILS # BLD AUTO: 5.5 K/UL — SIGNIFICANT CHANGE UP (ref 1.8–7.4)
NEUTROPHILS NFR BLD AUTO: 83.7 % — HIGH (ref 43–77)
PLATELET # BLD AUTO: 253 K/UL — SIGNIFICANT CHANGE UP (ref 150–400)
RBC # BLD: 3.92 M/UL — SIGNIFICANT CHANGE UP (ref 3.8–5.2)
RBC # FLD: 11.4 % — SIGNIFICANT CHANGE UP (ref 10.3–14.5)
WBC # BLD: 6.6 K/UL — SIGNIFICANT CHANGE UP (ref 3.8–10.5)
WBC # FLD AUTO: 6.6 K/UL — SIGNIFICANT CHANGE UP (ref 3.8–10.5)

## 2025-01-23 ENCOUNTER — APPOINTMENT (OUTPATIENT)
Dept: HEMATOLOGY ONCOLOGY | Facility: CLINIC | Age: 70
End: 2025-01-23

## 2025-01-23 ENCOUNTER — RESULT REVIEW (OUTPATIENT)
Age: 70
End: 2025-01-23

## 2025-01-23 LAB
BASOPHILS # BLD AUTO: 0 K/UL — SIGNIFICANT CHANGE UP (ref 0–0.2)
BASOPHILS NFR BLD AUTO: 0.5 % — SIGNIFICANT CHANGE UP (ref 0–2)
EOSINOPHIL # BLD AUTO: 0 K/UL — SIGNIFICANT CHANGE UP (ref 0–0.5)
EOSINOPHIL NFR BLD AUTO: 0.1 % — SIGNIFICANT CHANGE UP (ref 0–6)
HCT VFR BLD CALC: 41.2 % — SIGNIFICANT CHANGE UP (ref 34.5–45)
HGB BLD-MCNC: 13.6 G/DL — SIGNIFICANT CHANGE UP (ref 11.5–15.5)
LYMPHOCYTES # BLD AUTO: 0.5 K/UL — LOW (ref 1–3.3)
LYMPHOCYTES # BLD AUTO: 7.3 % — LOW (ref 13–44)
MCHC RBC-ENTMCNC: 32.4 PG — SIGNIFICANT CHANGE UP (ref 27–34)
MCHC RBC-ENTMCNC: 33 G/DL — SIGNIFICANT CHANGE UP (ref 32–36)
MCV RBC AUTO: 98.1 FL — SIGNIFICANT CHANGE UP (ref 80–100)
MONOCYTES # BLD AUTO: 0.6 K/UL — SIGNIFICANT CHANGE UP (ref 0–0.9)
MONOCYTES NFR BLD AUTO: 7.4 % — SIGNIFICANT CHANGE UP (ref 2–14)
NEUTROPHILS # BLD AUTO: 6.3 K/UL — SIGNIFICANT CHANGE UP (ref 1.8–7.4)
NEUTROPHILS NFR BLD AUTO: 84.8 % — HIGH (ref 43–77)
PLATELET # BLD AUTO: 255 K/UL — SIGNIFICANT CHANGE UP (ref 150–400)
RBC # BLD: 4.2 M/UL — SIGNIFICANT CHANGE UP (ref 3.8–5.2)
RBC # FLD: 12.6 % — SIGNIFICANT CHANGE UP (ref 10.3–14.5)
WBC # BLD: 7.5 K/UL — SIGNIFICANT CHANGE UP (ref 3.8–10.5)
WBC # FLD AUTO: 7.5 K/UL — SIGNIFICANT CHANGE UP (ref 3.8–10.5)

## 2025-01-27 ENCOUNTER — OUTPATIENT (OUTPATIENT)
Dept: OUTPATIENT SERVICES | Facility: HOSPITAL | Age: 70
LOS: 1 days | End: 2025-01-27
Payer: MEDICARE

## 2025-01-27 ENCOUNTER — APPOINTMENT (OUTPATIENT)
Dept: MRI IMAGING | Facility: CLINIC | Age: 70
End: 2025-01-27

## 2025-01-27 DIAGNOSIS — Z98.890 OTHER SPECIFIED POSTPROCEDURAL STATES: Chronic | ICD-10-CM

## 2025-01-27 DIAGNOSIS — Z87.898 PERSONAL HISTORY OF OTHER SPECIFIED CONDITIONS: Chronic | ICD-10-CM

## 2025-01-27 DIAGNOSIS — C71.9 MALIGNANT NEOPLASM OF BRAIN, UNSPECIFIED: ICD-10-CM

## 2025-01-27 PROCEDURE — 70553 MRI BRAIN STEM W/O & W/DYE: CPT | Mod: 26

## 2025-01-30 ENCOUNTER — NON-APPOINTMENT (OUTPATIENT)
Age: 70
End: 2025-01-30

## 2025-01-30 ENCOUNTER — APPOINTMENT (OUTPATIENT)
Age: 70
End: 2025-01-30

## 2025-01-30 ENCOUNTER — APPOINTMENT (OUTPATIENT)
Dept: NEUROSURGERY | Facility: CLINIC | Age: 70
End: 2025-01-30
Payer: MEDICARE

## 2025-01-30 ENCOUNTER — APPOINTMENT (OUTPATIENT)
Dept: NEUROLOGY | Facility: CLINIC | Age: 70
End: 2025-01-30
Payer: MEDICARE

## 2025-01-30 ENCOUNTER — RESULT REVIEW (OUTPATIENT)
Age: 70
End: 2025-01-30

## 2025-01-30 VITALS
DIASTOLIC BLOOD PRESSURE: 70 MMHG | WEIGHT: 147 LBS | HEIGHT: 61 IN | HEART RATE: 69 BPM | SYSTOLIC BLOOD PRESSURE: 130 MMHG | BODY MASS INDEX: 27.75 KG/M2 | TEMPERATURE: 97.3 F | OXYGEN SATURATION: 96 %

## 2025-01-30 VITALS
TEMPERATURE: 97.1 F | SYSTOLIC BLOOD PRESSURE: 136 MMHG | DIASTOLIC BLOOD PRESSURE: 70 MMHG | HEART RATE: 70 BPM | HEIGHT: 61 IN | BODY MASS INDEX: 27.86 KG/M2 | OXYGEN SATURATION: 95 % | WEIGHT: 147.56 LBS

## 2025-01-30 DIAGNOSIS — C71.9 MALIGNANT NEOPLASM OF BRAIN, UNSPECIFIED: ICD-10-CM

## 2025-01-30 DIAGNOSIS — G81.94 HEMIPLEGIA, UNSPECIFIED AFFECTING LEFT NONDOMINANT SIDE: ICD-10-CM

## 2025-01-30 DIAGNOSIS — G40.909 EPILEPSY, UNSPECIFIED, NOT INTRACTABLE, W/OUT STATUS EPILEPTICUS: ICD-10-CM

## 2025-01-30 LAB
ALBUMIN SERPL ELPH-MCNC: 4.1 G/DL — SIGNIFICANT CHANGE UP (ref 3.3–5)
ALP SERPL-CCNC: 55 U/L — SIGNIFICANT CHANGE UP (ref 40–120)
ALT FLD-CCNC: 21 U/L — SIGNIFICANT CHANGE UP (ref 10–45)
ANION GAP SERPL CALC-SCNC: 12 MMOL/L — SIGNIFICANT CHANGE UP (ref 5–17)
AST SERPL-CCNC: 21 U/L — SIGNIFICANT CHANGE UP (ref 10–40)
BASOPHILS # BLD AUTO: 0 K/UL — SIGNIFICANT CHANGE UP (ref 0–0.2)
BASOPHILS NFR BLD AUTO: 0.6 % — SIGNIFICANT CHANGE UP (ref 0–2)
BILIRUB SERPL-MCNC: 0.2 MG/DL — SIGNIFICANT CHANGE UP (ref 0.2–1.2)
BUN SERPL-MCNC: 19 MG/DL — SIGNIFICANT CHANGE UP (ref 7–23)
CALCIUM SERPL-MCNC: 9.4 MG/DL — SIGNIFICANT CHANGE UP (ref 8.4–10.5)
CHLORIDE SERPL-SCNC: 105 MMOL/L — SIGNIFICANT CHANGE UP (ref 96–108)
CO2 SERPL-SCNC: 25 MMOL/L — SIGNIFICANT CHANGE UP (ref 22–31)
CREAT SERPL-MCNC: 0.47 MG/DL — LOW (ref 0.5–1.3)
EGFR: 102 ML/MIN/1.73M2 — SIGNIFICANT CHANGE UP
EOSINOPHIL # BLD AUTO: 0 K/UL — SIGNIFICANT CHANGE UP (ref 0–0.5)
EOSINOPHIL NFR BLD AUTO: 0.1 % — SIGNIFICANT CHANGE UP (ref 0–6)
GLUCOSE SERPL-MCNC: 118 MG/DL — HIGH (ref 70–99)
HCT VFR BLD CALC: 40.5 % — SIGNIFICANT CHANGE UP (ref 34.5–45)
HGB BLD-MCNC: 13.6 G/DL — SIGNIFICANT CHANGE UP (ref 11.5–15.5)
LYMPHOCYTES # BLD AUTO: 0.4 K/UL — LOW (ref 1–3.3)
LYMPHOCYTES # BLD AUTO: 6.1 % — LOW (ref 13–44)
MCHC RBC-ENTMCNC: 33.4 PG — SIGNIFICANT CHANGE UP (ref 27–34)
MCHC RBC-ENTMCNC: 33.6 G/DL — SIGNIFICANT CHANGE UP (ref 32–36)
MCV RBC AUTO: 99.4 FL — SIGNIFICANT CHANGE UP (ref 80–100)
MONOCYTES # BLD AUTO: 0.5 K/UL — SIGNIFICANT CHANGE UP (ref 0–0.9)
MONOCYTES NFR BLD AUTO: 7.7 % — SIGNIFICANT CHANGE UP (ref 2–14)
NEUTROPHILS # BLD AUTO: 5.2 K/UL — SIGNIFICANT CHANGE UP (ref 1.8–7.4)
NEUTROPHILS NFR BLD AUTO: 85.5 % — HIGH (ref 43–77)
PLATELET # BLD AUTO: 225 K/UL — SIGNIFICANT CHANGE UP (ref 150–400)
POTASSIUM SERPL-MCNC: 3.9 MMOL/L — SIGNIFICANT CHANGE UP (ref 3.5–5.3)
POTASSIUM SERPL-SCNC: 3.9 MMOL/L — SIGNIFICANT CHANGE UP (ref 3.5–5.3)
PROT SERPL-MCNC: 6.3 G/DL — SIGNIFICANT CHANGE UP (ref 6–8.3)
RBC # BLD: 4.08 M/UL — SIGNIFICANT CHANGE UP (ref 3.8–5.2)
RBC # FLD: 11.6 % — SIGNIFICANT CHANGE UP (ref 10.3–14.5)
SODIUM SERPL-SCNC: 142 MMOL/L — SIGNIFICANT CHANGE UP (ref 135–145)
WBC # BLD: 6.1 K/UL — SIGNIFICANT CHANGE UP (ref 3.8–10.5)
WBC # FLD AUTO: 6.1 K/UL — SIGNIFICANT CHANGE UP (ref 3.8–10.5)

## 2025-01-30 PROCEDURE — 99215 OFFICE O/P EST HI 40 MIN: CPT

## 2025-01-30 PROCEDURE — 99213 OFFICE O/P EST LOW 20 MIN: CPT

## 2025-02-06 ENCOUNTER — RESULT REVIEW (OUTPATIENT)
Age: 70
End: 2025-02-06

## 2025-02-06 ENCOUNTER — APPOINTMENT (OUTPATIENT)
Dept: HEMATOLOGY ONCOLOGY | Facility: CLINIC | Age: 70
End: 2025-02-06

## 2025-02-06 LAB
BASOPHILS # BLD AUTO: 0.1 K/UL — SIGNIFICANT CHANGE UP (ref 0–0.2)
BASOPHILS NFR BLD AUTO: 0.9 % — SIGNIFICANT CHANGE UP (ref 0–2)
EOSINOPHIL # BLD AUTO: 0 K/UL — SIGNIFICANT CHANGE UP (ref 0–0.5)
EOSINOPHIL NFR BLD AUTO: 0 % — SIGNIFICANT CHANGE UP (ref 0–6)
HCT VFR BLD CALC: 46 % — HIGH (ref 34.5–45)
HGB BLD-MCNC: 14.5 G/DL — SIGNIFICANT CHANGE UP (ref 11.5–15.5)
LYMPHOCYTES # BLD AUTO: 0.4 K/UL — LOW (ref 1–3.3)
LYMPHOCYTES # BLD AUTO: 6.3 % — LOW (ref 13–44)
MCHC RBC-ENTMCNC: 31.5 G/DL — LOW (ref 32–36)
MCHC RBC-ENTMCNC: 32.2 PG — SIGNIFICANT CHANGE UP (ref 27–34)
MCV RBC AUTO: 102.1 FL — HIGH (ref 80–100)
MONOCYTES # BLD AUTO: 0.7 K/UL — SIGNIFICANT CHANGE UP (ref 0–0.9)
MONOCYTES NFR BLD AUTO: 10.2 % — SIGNIFICANT CHANGE UP (ref 2–14)
NEUTROPHILS # BLD AUTO: 5.3 K/UL — SIGNIFICANT CHANGE UP (ref 1.8–7.4)
NEUTROPHILS NFR BLD AUTO: 82.6 % — HIGH (ref 43–77)
PLATELET # BLD AUTO: 221 K/UL — SIGNIFICANT CHANGE UP (ref 150–400)
RBC # BLD: 4.5 M/UL — SIGNIFICANT CHANGE UP (ref 3.8–5.2)
RBC # FLD: 14.2 % — SIGNIFICANT CHANGE UP (ref 10.3–14.5)
WBC # BLD: 6.4 K/UL — SIGNIFICANT CHANGE UP (ref 3.8–10.5)
WBC # FLD AUTO: 6.4 K/UL — SIGNIFICANT CHANGE UP (ref 3.8–10.5)

## 2025-02-13 ENCOUNTER — RESULT REVIEW (OUTPATIENT)
Age: 70
End: 2025-02-13

## 2025-02-13 ENCOUNTER — APPOINTMENT (OUTPATIENT)
Dept: NEUROSURGERY | Facility: CLINIC | Age: 70
End: 2025-02-13
Payer: MEDICARE

## 2025-02-13 ENCOUNTER — APPOINTMENT (OUTPATIENT)
Dept: HEMATOLOGY ONCOLOGY | Facility: CLINIC | Age: 70
End: 2025-02-13

## 2025-02-13 VITALS
DIASTOLIC BLOOD PRESSURE: 71 MMHG | BODY MASS INDEX: 27.75 KG/M2 | HEART RATE: 75 BPM | TEMPERATURE: 97 F | HEIGHT: 61 IN | WEIGHT: 147 LBS | OXYGEN SATURATION: 98 % | SYSTOLIC BLOOD PRESSURE: 132 MMHG

## 2025-02-13 DIAGNOSIS — D49.6 NEOPLASM OF UNSPECIFIED BEHAVIOR OF BRAIN: ICD-10-CM

## 2025-02-13 LAB
BASOPHILS # BLD AUTO: 0.01 K/UL — SIGNIFICANT CHANGE UP (ref 0–0.2)
BASOPHILS NFR BLD AUTO: 0.2 % — SIGNIFICANT CHANGE UP (ref 0–2)
EOSINOPHIL # BLD AUTO: 0 K/UL — SIGNIFICANT CHANGE UP (ref 0–0.5)
EOSINOPHIL NFR BLD AUTO: 0 % — SIGNIFICANT CHANGE UP (ref 0–6)
HCT VFR BLD CALC: 38 % — SIGNIFICANT CHANGE UP (ref 34.5–45)
HGB BLD-MCNC: 12.7 G/DL — SIGNIFICANT CHANGE UP (ref 11.5–15.5)
IMM GRANULOCYTES # BLD AUTO: 0.04 K/UL — SIGNIFICANT CHANGE UP (ref 0–0.07)
IMM GRANULOCYTES NFR BLD AUTO: 0.8 % — SIGNIFICANT CHANGE UP (ref 0–0.9)
LYMPHOCYTES # BLD AUTO: 0.77 K/UL — LOW (ref 1–3.3)
LYMPHOCYTES NFR BLD AUTO: 16 % — SIGNIFICANT CHANGE UP (ref 13–44)
MCHC RBC-ENTMCNC: 33 PG — SIGNIFICANT CHANGE UP (ref 27–34)
MCHC RBC-ENTMCNC: 33.4 G/DL — SIGNIFICANT CHANGE UP (ref 32–36)
MCV RBC AUTO: 98.7 FL — SIGNIFICANT CHANGE UP (ref 80–100)
MONOCYTES # BLD AUTO: 0.42 K/UL — SIGNIFICANT CHANGE UP (ref 0–0.9)
MONOCYTES NFR BLD AUTO: 8.7 % — SIGNIFICANT CHANGE UP (ref 2–14)
NEUTROPHILS # BLD AUTO: 3.57 K/UL — SIGNIFICANT CHANGE UP (ref 1.8–7.4)
NEUTROPHILS NFR BLD AUTO: 74.3 % — SIGNIFICANT CHANGE UP (ref 43–77)
NRBC # BLD AUTO: 0 K/UL — SIGNIFICANT CHANGE UP (ref 0–0)
NRBC # FLD: 0 K/UL — SIGNIFICANT CHANGE UP (ref 0–0)
NRBC BLD AUTO-RTO: 0 /100 WBCS — SIGNIFICANT CHANGE UP (ref 0–0)
PLATELET # BLD AUTO: 310 K/UL — SIGNIFICANT CHANGE UP (ref 150–400)
PMV BLD: 8.7 FL — SIGNIFICANT CHANGE UP (ref 7–13)
RBC # BLD: 3.85 M/UL — SIGNIFICANT CHANGE UP (ref 3.8–5.2)
RBC # FLD: 12.1 % — SIGNIFICANT CHANGE UP (ref 10.3–14.5)
WBC # BLD: 4.81 K/UL — SIGNIFICANT CHANGE UP (ref 3.8–10.5)
WBC # FLD AUTO: 4.81 K/UL — SIGNIFICANT CHANGE UP (ref 3.8–10.5)

## 2025-02-13 PROCEDURE — 99214 OFFICE O/P EST MOD 30 MIN: CPT

## 2025-02-17 ENCOUNTER — OUTPATIENT (OUTPATIENT)
Dept: OUTPATIENT SERVICES | Facility: HOSPITAL | Age: 70
LOS: 1 days | Discharge: HOME | End: 2025-02-17

## 2025-02-17 DIAGNOSIS — Z87.898 PERSONAL HISTORY OF OTHER SPECIFIED CONDITIONS: Chronic | ICD-10-CM

## 2025-02-17 DIAGNOSIS — Z98.890 OTHER SPECIFIED POSTPROCEDURAL STATES: Chronic | ICD-10-CM

## 2025-02-17 DIAGNOSIS — C71.9 MALIGNANT NEOPLASM OF BRAIN, UNSPECIFIED: ICD-10-CM

## 2025-02-20 ENCOUNTER — RESULT REVIEW (OUTPATIENT)
Age: 70
End: 2025-02-20

## 2025-02-20 ENCOUNTER — APPOINTMENT (OUTPATIENT)
Age: 70
End: 2025-02-20

## 2025-02-20 ENCOUNTER — APPOINTMENT (OUTPATIENT)
Dept: NEUROLOGY | Facility: CLINIC | Age: 70
End: 2025-02-20
Payer: MEDICARE

## 2025-02-20 VITALS
SYSTOLIC BLOOD PRESSURE: 131 MMHG | OXYGEN SATURATION: 95 % | HEIGHT: 61 IN | DIASTOLIC BLOOD PRESSURE: 65 MMHG | HEART RATE: 76 BPM | BODY MASS INDEX: 27.01 KG/M2 | WEIGHT: 143.04 LBS

## 2025-02-20 DIAGNOSIS — G81.94 HEMIPLEGIA, UNSPECIFIED AFFECTING LEFT NONDOMINANT SIDE: ICD-10-CM

## 2025-02-20 DIAGNOSIS — G40.909 EPILEPSY, UNSPECIFIED, NOT INTRACTABLE, W/OUT STATUS EPILEPTICUS: ICD-10-CM

## 2025-02-20 DIAGNOSIS — C71.9 MALIGNANT NEOPLASM OF BRAIN, UNSPECIFIED: ICD-10-CM

## 2025-02-20 LAB
ALBUMIN SERPL ELPH-MCNC: 4.2 G/DL — SIGNIFICANT CHANGE UP (ref 3.3–5)
ALP SERPL-CCNC: 51 U/L — SIGNIFICANT CHANGE UP (ref 40–120)
ALT FLD-CCNC: 23 U/L — SIGNIFICANT CHANGE UP (ref 10–45)
ANION GAP SERPL CALC-SCNC: 14 MMOL/L — SIGNIFICANT CHANGE UP (ref 5–17)
ANISOCYTOSIS BLD QL: SLIGHT — SIGNIFICANT CHANGE UP
AST SERPL-CCNC: 17 U/L — SIGNIFICANT CHANGE UP (ref 10–40)
BASOPHILS # BLD AUTO: 0.02 K/UL — SIGNIFICANT CHANGE UP (ref 0–0.2)
BASOPHILS # BLD MANUAL: 0 K/UL — SIGNIFICANT CHANGE UP (ref 0–0.2)
BASOPHILS NFR BLD AUTO: 0.3 % — SIGNIFICANT CHANGE UP (ref 0–2)
BASOPHILS NFR BLD MANUAL: 0 % — SIGNIFICANT CHANGE UP (ref 0–2)
BILIRUB SERPL-MCNC: 0.4 MG/DL — SIGNIFICANT CHANGE UP (ref 0.2–1.2)
BUN SERPL-MCNC: 18 MG/DL — SIGNIFICANT CHANGE UP (ref 7–23)
CALCIUM SERPL-MCNC: 9.4 MG/DL — SIGNIFICANT CHANGE UP (ref 8.4–10.5)
CHLORIDE SERPL-SCNC: 105 MMOL/L — SIGNIFICANT CHANGE UP (ref 96–108)
CO2 SERPL-SCNC: 23 MMOL/L — SIGNIFICANT CHANGE UP (ref 22–31)
CREAT SERPL-MCNC: 0.53 MG/DL — SIGNIFICANT CHANGE UP (ref 0.5–1.3)
DACRYOCYTES BLD QL SMEAR: SLIGHT — SIGNIFICANT CHANGE UP
EGFR: 99 ML/MIN/1.73M2 — SIGNIFICANT CHANGE UP
EGFR: 99 ML/MIN/1.73M2 — SIGNIFICANT CHANGE UP
ELLIPTOCYTES BLD QL SMEAR: SLIGHT — SIGNIFICANT CHANGE UP
EOSINOPHIL # BLD AUTO: 0 K/UL — SIGNIFICANT CHANGE UP (ref 0–0.5)
EOSINOPHIL # BLD MANUAL: 0 K/UL — SIGNIFICANT CHANGE UP (ref 0–0.5)
EOSINOPHIL NFR BLD AUTO: 0 % — SIGNIFICANT CHANGE UP (ref 0–6)
EOSINOPHIL NFR BLD MANUAL: 0 % — SIGNIFICANT CHANGE UP (ref 0–6)
GLUCOSE SERPL-MCNC: 101 MG/DL — HIGH (ref 70–99)
HCT VFR BLD CALC: 40.2 % — SIGNIFICANT CHANGE UP (ref 34.5–45)
HGB BLD-MCNC: 13.1 G/DL — SIGNIFICANT CHANGE UP (ref 11.5–15.5)
IMM GRANULOCYTES # BLD AUTO: 0.09 K/UL — HIGH (ref 0–0.07)
IMM GRANULOCYTES NFR BLD AUTO: 1.6 % — HIGH (ref 0–0.9)
LG PLATELETS BLD QL AUTO: SLIGHT — SIGNIFICANT CHANGE UP
LYMPHOCYTES # BLD AUTO: 0.46 K/UL — LOW (ref 1–3.3)
LYMPHOCYTES # BLD MANUAL: 0.29 K/UL — LOW (ref 1–3.3)
LYMPHOCYTES NFR BLD AUTO: 8 % — LOW (ref 13–44)
LYMPHOCYTES NFR BLD MANUAL: 5 % — LOW (ref 13–44)
MACROCYTES BLD QL: SLIGHT — SIGNIFICANT CHANGE UP
MANUAL MYELOCYTE #: 0.06 K/UL — HIGH (ref 0–0)
MANUAL SMEAR VERIFICATION: YES — SIGNIFICANT CHANGE UP
MCHC RBC-ENTMCNC: 32.1 PG — SIGNIFICANT CHANGE UP (ref 27–34)
MCHC RBC-ENTMCNC: 32.6 G/DL — SIGNIFICANT CHANGE UP (ref 32–36)
MCV RBC AUTO: 98.5 FL — SIGNIFICANT CHANGE UP (ref 80–100)
MICROCYTES BLD QL: SLIGHT — SIGNIFICANT CHANGE UP
MONOCYTES # BLD AUTO: 0.48 K/UL — SIGNIFICANT CHANGE UP (ref 0–0.9)
MONOCYTES # BLD MANUAL: 0.46 K/UL — SIGNIFICANT CHANGE UP (ref 0–0.9)
MONOCYTES NFR BLD AUTO: 8.3 % — SIGNIFICANT CHANGE UP (ref 2–14)
MONOCYTES NFR BLD MANUAL: 8 % — SIGNIFICANT CHANGE UP (ref 2–14)
MYELOCYTES NFR BLD: 1 % — HIGH (ref 0–0)
NEUTROPHILS # BLD AUTO: 4.7 K/UL — SIGNIFICANT CHANGE UP (ref 1.8–7.4)
NEUTROPHILS # BLD MANUAL: 4.95 K/UL — SIGNIFICANT CHANGE UP (ref 1.8–7.4)
NEUTROPHILS NFR BLD AUTO: 81.8 % — HIGH (ref 43–77)
NEUTROPHILS NFR BLD MANUAL: 86 % — HIGH (ref 43–77)
NRBC # BLD AUTO: 0 K/UL — SIGNIFICANT CHANGE UP (ref 0–0)
NRBC # FLD: 0 K/UL — SIGNIFICANT CHANGE UP (ref 0–0)
NRBC BLD AUTO-RTO: 0 /100 WBCS — SIGNIFICANT CHANGE UP (ref 0–0)
OVALOCYTES BLD QL SMEAR: SLIGHT — SIGNIFICANT CHANGE UP
PLAT MORPH BLD: NORMAL — SIGNIFICANT CHANGE UP
PLATELET # BLD AUTO: 336 K/UL — SIGNIFICANT CHANGE UP (ref 150–400)
PMV BLD: 9 FL — SIGNIFICANT CHANGE UP (ref 7–13)
POIKILOCYTOSIS BLD QL AUTO: SLIGHT — SIGNIFICANT CHANGE UP
POLYCHROMASIA BLD QL SMEAR: SLIGHT — SIGNIFICANT CHANGE UP
POTASSIUM SERPL-MCNC: 4.2 MMOL/L — SIGNIFICANT CHANGE UP (ref 3.5–5.3)
POTASSIUM SERPL-SCNC: 4.2 MMOL/L — SIGNIFICANT CHANGE UP (ref 3.5–5.3)
PROT SERPL-MCNC: 6 G/DL — SIGNIFICANT CHANGE UP (ref 6–8.3)
RBC # BLD: 4.08 M/UL — SIGNIFICANT CHANGE UP (ref 3.8–5.2)
RBC # FLD: 12.4 % — SIGNIFICANT CHANGE UP (ref 10.3–14.5)
RBC BLD AUTO: SIGNIFICANT CHANGE UP
SODIUM SERPL-SCNC: 143 MMOL/L — SIGNIFICANT CHANGE UP (ref 135–145)
WBC # BLD: 5.75 K/UL — SIGNIFICANT CHANGE UP (ref 3.8–10.5)
WBC # FLD AUTO: 5.75 K/UL — SIGNIFICANT CHANGE UP (ref 3.8–10.5)
WBC MORPHOLOGY: NORMAL — SIGNIFICANT CHANGE UP

## 2025-02-20 PROCEDURE — 99215 OFFICE O/P EST HI 40 MIN: CPT

## 2025-02-21 DIAGNOSIS — Z51.11 ENCOUNTER FOR ANTINEOPLASTIC CHEMOTHERAPY: ICD-10-CM

## 2025-02-21 LAB
CREAT ?TM UR-MCNC: 104 MG/DL — SIGNIFICANT CHANGE UP
PROT ?TM UR-MCNC: 16 MG/DL — HIGH (ref 0–12)
PROT/CREAT UR-RTO: 0.2 RATIO — SIGNIFICANT CHANGE UP (ref 0–0.2)

## 2025-02-27 ENCOUNTER — RESULT REVIEW (OUTPATIENT)
Age: 70
End: 2025-02-27

## 2025-02-27 ENCOUNTER — APPOINTMENT (OUTPATIENT)
Dept: HEMATOLOGY ONCOLOGY | Facility: CLINIC | Age: 70
End: 2025-02-27

## 2025-02-27 LAB
BASOPHILS # BLD AUTO: 0.02 K/UL — SIGNIFICANT CHANGE UP (ref 0–0.2)
BASOPHILS NFR BLD AUTO: 0.4 % — SIGNIFICANT CHANGE UP (ref 0–2)
EOSINOPHIL # BLD AUTO: 0.01 K/UL — SIGNIFICANT CHANGE UP (ref 0–0.5)
EOSINOPHIL NFR BLD AUTO: 0.2 % — SIGNIFICANT CHANGE UP (ref 0–6)
HCT VFR BLD CALC: 40.8 % — SIGNIFICANT CHANGE UP (ref 34.5–45)
HGB BLD-MCNC: 13.3 G/DL — SIGNIFICANT CHANGE UP (ref 11.5–15.5)
IMM GRANULOCYTES # BLD AUTO: 0.04 K/UL — SIGNIFICANT CHANGE UP (ref 0–0.07)
IMM GRANULOCYTES NFR BLD AUTO: 0.7 % — SIGNIFICANT CHANGE UP (ref 0–0.9)
LYMPHOCYTES # BLD AUTO: 0.55 K/UL — LOW (ref 1–3.3)
LYMPHOCYTES NFR BLD AUTO: 9.7 % — LOW (ref 13–44)
MCHC RBC-ENTMCNC: 32.4 PG — SIGNIFICANT CHANGE UP (ref 27–34)
MCHC RBC-ENTMCNC: 32.6 G/DL — SIGNIFICANT CHANGE UP (ref 32–36)
MCV RBC AUTO: 99.5 FL — SIGNIFICANT CHANGE UP (ref 80–100)
MONOCYTES # BLD AUTO: 0.43 K/UL — SIGNIFICANT CHANGE UP (ref 0–0.9)
MONOCYTES NFR BLD AUTO: 7.6 % — SIGNIFICANT CHANGE UP (ref 2–14)
NEUTROPHILS # BLD AUTO: 4.61 K/UL — SIGNIFICANT CHANGE UP (ref 1.8–7.4)
NEUTROPHILS NFR BLD AUTO: 81.4 % — HIGH (ref 43–77)
NRBC # BLD AUTO: 0 K/UL — SIGNIFICANT CHANGE UP (ref 0–0)
NRBC # FLD: 0 K/UL — SIGNIFICANT CHANGE UP (ref 0–0)
NRBC BLD AUTO-RTO: 0 /100 WBCS — SIGNIFICANT CHANGE UP (ref 0–0)
PLATELET # BLD AUTO: 202 K/UL — SIGNIFICANT CHANGE UP (ref 150–400)
PMV BLD: 9.2 FL — SIGNIFICANT CHANGE UP (ref 7–13)
RBC # BLD: 4.1 M/UL — SIGNIFICANT CHANGE UP (ref 3.8–5.2)
RBC # FLD: 12.5 % — SIGNIFICANT CHANGE UP (ref 10.3–14.5)
WBC # BLD: 5.66 K/UL — SIGNIFICANT CHANGE UP (ref 3.8–10.5)
WBC # FLD AUTO: 5.66 K/UL — SIGNIFICANT CHANGE UP (ref 3.8–10.5)

## 2025-03-06 ENCOUNTER — RESULT REVIEW (OUTPATIENT)
Age: 70
End: 2025-03-06

## 2025-03-06 ENCOUNTER — APPOINTMENT (OUTPATIENT)
Dept: HEMATOLOGY ONCOLOGY | Facility: CLINIC | Age: 70
End: 2025-03-06

## 2025-03-06 LAB
BASOPHILS # BLD AUTO: 0.02 K/UL — SIGNIFICANT CHANGE UP (ref 0–0.2)
BASOPHILS # BLD MANUAL: 0 K/UL — SIGNIFICANT CHANGE UP (ref 0–0.2)
BASOPHILS NFR BLD AUTO: 0.4 % — SIGNIFICANT CHANGE UP (ref 0–2)
BASOPHILS NFR BLD MANUAL: 0 % — SIGNIFICANT CHANGE UP (ref 0–2)
EOSINOPHIL # BLD AUTO: 0.01 K/UL — SIGNIFICANT CHANGE UP (ref 0–0.5)
EOSINOPHIL # BLD MANUAL: 0.05 K/UL — SIGNIFICANT CHANGE UP (ref 0–0.5)
EOSINOPHIL NFR BLD AUTO: 0.2 % — SIGNIFICANT CHANGE UP (ref 0–6)
EOSINOPHIL NFR BLD MANUAL: 1 % — SIGNIFICANT CHANGE UP (ref 0–6)
HCT VFR BLD CALC: 41.6 % — SIGNIFICANT CHANGE UP (ref 34.5–45)
HGB BLD-MCNC: 13.2 G/DL — SIGNIFICANT CHANGE UP (ref 11.5–15.5)
IMM GRANULOCYTES # BLD AUTO: 0.03 K/UL — SIGNIFICANT CHANGE UP (ref 0–0.07)
IMM GRANULOCYTES NFR BLD AUTO: 0.7 % — SIGNIFICANT CHANGE UP (ref 0–0.9)
LYMPHOCYTES # BLD AUTO: 0.46 K/UL — LOW (ref 1–3.3)
LYMPHOCYTES # BLD MANUAL: 0.18 K/UL — LOW (ref 1–3.3)
LYMPHOCYTES NFR BLD AUTO: 10.1 % — LOW (ref 13–44)
LYMPHOCYTES NFR BLD MANUAL: 4 % — LOW (ref 13–44)
MANUAL REACTIVE LYMPHOCYTES #: 0.09 K/UL — SIGNIFICANT CHANGE UP (ref 0–0.63)
MANUAL SMEAR VERIFICATION: YES — SIGNIFICANT CHANGE UP
MCHC RBC-ENTMCNC: 31.5 PG — SIGNIFICANT CHANGE UP (ref 27–34)
MCHC RBC-ENTMCNC: 31.7 G/DL — LOW (ref 32–36)
MCV RBC AUTO: 99.3 FL — SIGNIFICANT CHANGE UP (ref 80–100)
MONOCYTES # BLD AUTO: 0.38 K/UL — SIGNIFICANT CHANGE UP (ref 0–0.9)
MONOCYTES # BLD MANUAL: 0.32 K/UL — SIGNIFICANT CHANGE UP (ref 0–0.9)
MONOCYTES NFR BLD AUTO: 8.4 % — SIGNIFICANT CHANGE UP (ref 2–14)
MONOCYTES NFR BLD MANUAL: 7 % — SIGNIFICANT CHANGE UP (ref 2–14)
NEUTROPHILS # BLD AUTO: 3.65 K/UL — SIGNIFICANT CHANGE UP (ref 1.8–7.4)
NEUTROPHILS # BLD MANUAL: 3.91 K/UL — SIGNIFICANT CHANGE UP (ref 1.8–7.4)
NEUTROPHILS NFR BLD AUTO: 80.2 % — HIGH (ref 43–77)
NEUTROPHILS NFR BLD MANUAL: 86 % — HIGH (ref 43–77)
NRBC # BLD AUTO: 0 K/UL — SIGNIFICANT CHANGE UP (ref 0–0)
NRBC # FLD: 0 K/UL — SIGNIFICANT CHANGE UP (ref 0–0)
NRBC BLD AUTO-RTO: 0 /100 WBCS — SIGNIFICANT CHANGE UP (ref 0–0)
PLAT MORPH BLD: NORMAL — SIGNIFICANT CHANGE UP
PLATELET # BLD AUTO: 242 K/UL — SIGNIFICANT CHANGE UP (ref 150–400)
PMV BLD: 8.8 FL — SIGNIFICANT CHANGE UP (ref 7–13)
POLYCHROMASIA BLD QL SMEAR: SLIGHT — SIGNIFICANT CHANGE UP
RBC # BLD: 4.19 M/UL — SIGNIFICANT CHANGE UP (ref 3.8–5.2)
RBC # FLD: 12.6 % — SIGNIFICANT CHANGE UP (ref 10.3–14.5)
RBC BLD AUTO: SIGNIFICANT CHANGE UP
VARIANT LYMPHS # BLD: 2 % — SIGNIFICANT CHANGE UP (ref 0–6)
VARIANT LYMPHS NFR BLD MANUAL: 2 % — SIGNIFICANT CHANGE UP (ref 0–6)
WBC # BLD: 4.55 K/UL — SIGNIFICANT CHANGE UP (ref 3.8–10.5)
WBC # FLD AUTO: 4.55 K/UL — SIGNIFICANT CHANGE UP (ref 3.8–10.5)
WBC MORPHOLOGY: NORMAL — SIGNIFICANT CHANGE UP

## 2025-03-13 ENCOUNTER — APPOINTMENT (OUTPATIENT)
Dept: HEMATOLOGY ONCOLOGY | Facility: CLINIC | Age: 70
End: 2025-03-13

## 2025-03-13 ENCOUNTER — RESULT REVIEW (OUTPATIENT)
Age: 70
End: 2025-03-13

## 2025-03-13 ENCOUNTER — APPOINTMENT (OUTPATIENT)
Age: 70
End: 2025-03-13

## 2025-03-13 ENCOUNTER — APPOINTMENT (OUTPATIENT)
Dept: NEUROLOGY | Facility: CLINIC | Age: 70
End: 2025-03-13
Payer: MEDICARE

## 2025-03-13 DIAGNOSIS — G81.94 HEMIPLEGIA, UNSPECIFIED AFFECTING LEFT NONDOMINANT SIDE: ICD-10-CM

## 2025-03-13 DIAGNOSIS — G72.0 DRUG-INDUCED MYOPATHY: ICD-10-CM

## 2025-03-13 DIAGNOSIS — T38.0X5A DRUG-INDUCED MYOPATHY: ICD-10-CM

## 2025-03-13 DIAGNOSIS — G40.909 EPILEPSY, UNSPECIFIED, NOT INTRACTABLE, W/OUT STATUS EPILEPTICUS: ICD-10-CM

## 2025-03-13 LAB
BASOPHILS # BLD AUTO: 0.02 K/UL — SIGNIFICANT CHANGE UP (ref 0–0.2)
BASOPHILS NFR BLD AUTO: 0.4 % — SIGNIFICANT CHANGE UP (ref 0–2)
EOSINOPHIL # BLD AUTO: 0.01 K/UL — SIGNIFICANT CHANGE UP (ref 0–0.5)
EOSINOPHIL NFR BLD AUTO: 0.2 % — SIGNIFICANT CHANGE UP (ref 0–6)
HCT VFR BLD CALC: 39.8 % — SIGNIFICANT CHANGE UP (ref 34.5–45)
HGB BLD-MCNC: 13.1 G/DL — SIGNIFICANT CHANGE UP (ref 11.5–15.5)
IMM GRANULOCYTES # BLD AUTO: 0.02 K/UL — SIGNIFICANT CHANGE UP (ref 0–0.07)
IMM GRANULOCYTES NFR BLD AUTO: 0.4 % — SIGNIFICANT CHANGE UP (ref 0–0.9)
LYMPHOCYTES # BLD AUTO: 0.56 K/UL — LOW (ref 1–3.3)
LYMPHOCYTES NFR BLD AUTO: 10.1 % — LOW (ref 13–44)
MCHC RBC-ENTMCNC: 32.5 PG — SIGNIFICANT CHANGE UP (ref 27–34)
MCHC RBC-ENTMCNC: 32.9 G/DL — SIGNIFICANT CHANGE UP (ref 32–36)
MCV RBC AUTO: 98.8 FL — SIGNIFICANT CHANGE UP (ref 80–100)
MONOCYTES # BLD AUTO: 0.54 K/UL — SIGNIFICANT CHANGE UP (ref 0–0.9)
MONOCYTES NFR BLD AUTO: 9.7 % — SIGNIFICANT CHANGE UP (ref 2–14)
NEUTROPHILS # BLD AUTO: 4.4 K/UL — SIGNIFICANT CHANGE UP (ref 1.8–7.4)
NEUTROPHILS NFR BLD AUTO: 79.2 % — HIGH (ref 43–77)
NRBC # BLD AUTO: 0 K/UL — SIGNIFICANT CHANGE UP (ref 0–0)
NRBC # FLD: 0 K/UL — SIGNIFICANT CHANGE UP (ref 0–0)
NRBC BLD AUTO-RTO: 0 /100 WBCS — SIGNIFICANT CHANGE UP (ref 0–0)
PLATELET # BLD AUTO: 328 K/UL — SIGNIFICANT CHANGE UP (ref 150–400)
PMV BLD: 9.3 FL — SIGNIFICANT CHANGE UP (ref 7–13)
RBC # BLD: 4.03 M/UL — SIGNIFICANT CHANGE UP (ref 3.8–5.2)
RBC # FLD: 12.9 % — SIGNIFICANT CHANGE UP (ref 10.3–14.5)
WBC # BLD: 5.55 K/UL — SIGNIFICANT CHANGE UP (ref 3.8–10.5)
WBC # FLD AUTO: 5.55 K/UL — SIGNIFICANT CHANGE UP (ref 3.8–10.5)

## 2025-03-13 PROCEDURE — 99215 OFFICE O/P EST HI 40 MIN: CPT

## 2025-03-14 LAB
ALBUMIN SERPL ELPH-MCNC: 4.3 G/DL — SIGNIFICANT CHANGE UP (ref 3.3–5)
ALP SERPL-CCNC: 46 U/L — SIGNIFICANT CHANGE UP (ref 40–120)
ALT FLD-CCNC: 23 U/L — SIGNIFICANT CHANGE UP (ref 10–45)
ANION GAP SERPL CALC-SCNC: 16 MMOL/L — SIGNIFICANT CHANGE UP (ref 5–17)
AST SERPL-CCNC: 19 U/L — SIGNIFICANT CHANGE UP (ref 10–40)
BILIRUB SERPL-MCNC: 0.3 MG/DL — SIGNIFICANT CHANGE UP (ref 0.2–1.2)
BUN SERPL-MCNC: 14 MG/DL — SIGNIFICANT CHANGE UP (ref 7–23)
CALCIUM SERPL-MCNC: 9.4 MG/DL — SIGNIFICANT CHANGE UP (ref 8.4–10.5)
CHLORIDE SERPL-SCNC: 108 MMOL/L — SIGNIFICANT CHANGE UP (ref 96–108)
CO2 SERPL-SCNC: 24 MMOL/L — SIGNIFICANT CHANGE UP (ref 22–31)
CREAT ?TM UR-MCNC: 106 MG/DL — SIGNIFICANT CHANGE UP
CREAT SERPL-MCNC: 0.59 MG/DL — SIGNIFICANT CHANGE UP (ref 0.5–1.3)
EGFR: 97 ML/MIN/1.73M2 — SIGNIFICANT CHANGE UP
EGFR: 97 ML/MIN/1.73M2 — SIGNIFICANT CHANGE UP
GLUCOSE SERPL-MCNC: 113 MG/DL — HIGH (ref 70–99)
POTASSIUM SERPL-MCNC: 4.2 MMOL/L — SIGNIFICANT CHANGE UP (ref 3.5–5.3)
POTASSIUM SERPL-SCNC: 4.2 MMOL/L — SIGNIFICANT CHANGE UP (ref 3.5–5.3)
PROT ?TM UR-MCNC: 16 MG/DL — HIGH (ref 0–12)
PROT SERPL-MCNC: 6.4 G/DL — SIGNIFICANT CHANGE UP (ref 6–8.3)
PROT/CREAT UR-RTO: 0.2 RATIO — SIGNIFICANT CHANGE UP (ref 0–0.2)
SODIUM SERPL-SCNC: 148 MMOL/L — HIGH (ref 135–145)

## 2025-03-17 ENCOUNTER — APPOINTMENT (OUTPATIENT)
Dept: NEUROSURGERY | Facility: CLINIC | Age: 70
End: 2025-03-17
Payer: MEDICARE

## 2025-03-17 VITALS
DIASTOLIC BLOOD PRESSURE: 75 MMHG | HEART RATE: 97 BPM | HEIGHT: 61 IN | TEMPERATURE: 97.2 F | OXYGEN SATURATION: 96 % | WEIGHT: 143 LBS | SYSTOLIC BLOOD PRESSURE: 123 MMHG | BODY MASS INDEX: 27 KG/M2

## 2025-03-17 DIAGNOSIS — C71.9 MALIGNANT NEOPLASM OF BRAIN, UNSPECIFIED: ICD-10-CM

## 2025-03-17 PROCEDURE — 99214 OFFICE O/P EST MOD 30 MIN: CPT

## 2025-03-20 ENCOUNTER — APPOINTMENT (OUTPATIENT)
Dept: HEMATOLOGY ONCOLOGY | Facility: CLINIC | Age: 70
End: 2025-03-20

## 2025-03-20 ENCOUNTER — RESULT REVIEW (OUTPATIENT)
Age: 70
End: 2025-03-20

## 2025-03-20 LAB
BASOPHILS # BLD AUTO: 0.03 K/UL — SIGNIFICANT CHANGE UP (ref 0–0.2)
BASOPHILS NFR BLD AUTO: 0.6 % — SIGNIFICANT CHANGE UP (ref 0–2)
EOSINOPHIL # BLD AUTO: 0.02 K/UL — SIGNIFICANT CHANGE UP (ref 0–0.5)
EOSINOPHIL NFR BLD AUTO: 0.4 % — SIGNIFICANT CHANGE UP (ref 0–6)
HCT VFR BLD CALC: 39.9 % — SIGNIFICANT CHANGE UP (ref 34.5–45)
HGB BLD-MCNC: 13.1 G/DL — SIGNIFICANT CHANGE UP (ref 11.5–15.5)
IMM GRANULOCYTES # BLD AUTO: 0.02 K/UL — SIGNIFICANT CHANGE UP (ref 0–0.07)
IMM GRANULOCYTES NFR BLD AUTO: 0.4 % — SIGNIFICANT CHANGE UP (ref 0–0.9)
LYMPHOCYTES # BLD AUTO: 0.57 K/UL — LOW (ref 1–3.3)
LYMPHOCYTES NFR BLD AUTO: 11 % — LOW (ref 13–44)
MCHC RBC-ENTMCNC: 31.7 PG — SIGNIFICANT CHANGE UP (ref 27–34)
MCHC RBC-ENTMCNC: 32.8 G/DL — SIGNIFICANT CHANGE UP (ref 32–36)
MCV RBC AUTO: 96.6 FL — SIGNIFICANT CHANGE UP (ref 80–100)
MONOCYTES # BLD AUTO: 0.52 K/UL — SIGNIFICANT CHANGE UP (ref 0–0.9)
MONOCYTES NFR BLD AUTO: 10.1 % — SIGNIFICANT CHANGE UP (ref 2–14)
NEUTROPHILS # BLD AUTO: 4 K/UL — SIGNIFICANT CHANGE UP (ref 1.8–7.4)
NEUTROPHILS NFR BLD AUTO: 77.5 % — HIGH (ref 43–77)
NRBC # BLD AUTO: 0 K/UL — SIGNIFICANT CHANGE UP (ref 0–0)
NRBC # FLD: 0 K/UL — SIGNIFICANT CHANGE UP (ref 0–0)
NRBC BLD AUTO-RTO: 0 /100 WBCS — SIGNIFICANT CHANGE UP (ref 0–0)
PLATELET # BLD AUTO: 263 K/UL — SIGNIFICANT CHANGE UP (ref 150–400)
PMV BLD: 8.9 FL — SIGNIFICANT CHANGE UP (ref 7–13)
RBC # BLD: 4.13 M/UL — SIGNIFICANT CHANGE UP (ref 3.8–5.2)
RBC # FLD: 12.5 % — SIGNIFICANT CHANGE UP (ref 10.3–14.5)
WBC # BLD: 5.16 K/UL — SIGNIFICANT CHANGE UP (ref 3.8–10.5)
WBC # FLD AUTO: 5.16 K/UL — SIGNIFICANT CHANGE UP (ref 3.8–10.5)

## 2025-03-27 ENCOUNTER — APPOINTMENT (OUTPATIENT)
Dept: HEMATOLOGY ONCOLOGY | Facility: CLINIC | Age: 70
End: 2025-03-27

## 2025-03-27 ENCOUNTER — RESULT REVIEW (OUTPATIENT)
Age: 70
End: 2025-03-27

## 2025-03-27 LAB
BASOPHILS # BLD AUTO: 0.03 K/UL — SIGNIFICANT CHANGE UP (ref 0–0.2)
BASOPHILS NFR BLD AUTO: 0.5 % — SIGNIFICANT CHANGE UP (ref 0–2)
EOSINOPHIL # BLD AUTO: 0.01 K/UL — SIGNIFICANT CHANGE UP (ref 0–0.5)
EOSINOPHIL NFR BLD AUTO: 0.2 % — SIGNIFICANT CHANGE UP (ref 0–6)
HCT VFR BLD CALC: 40.8 % — SIGNIFICANT CHANGE UP (ref 34.5–45)
HGB BLD-MCNC: 13.3 G/DL — SIGNIFICANT CHANGE UP (ref 11.5–15.5)
IMM GRANULOCYTES # BLD AUTO: 0.07 K/UL — SIGNIFICANT CHANGE UP (ref 0–0.07)
IMM GRANULOCYTES NFR BLD AUTO: 1.1 % — HIGH (ref 0–0.9)
LYMPHOCYTES # BLD AUTO: 0.53 K/UL — LOW (ref 1–3.3)
LYMPHOCYTES NFR BLD AUTO: 8.5 % — LOW (ref 13–44)
MCHC RBC-ENTMCNC: 31.9 PG — SIGNIFICANT CHANGE UP (ref 27–34)
MCHC RBC-ENTMCNC: 32.6 G/DL — SIGNIFICANT CHANGE UP (ref 32–36)
MCV RBC AUTO: 97.8 FL — SIGNIFICANT CHANGE UP (ref 80–100)
MONOCYTES # BLD AUTO: 0.38 K/UL — SIGNIFICANT CHANGE UP (ref 0–0.9)
MONOCYTES NFR BLD AUTO: 6.1 % — SIGNIFICANT CHANGE UP (ref 2–14)
NEUTROPHILS # BLD AUTO: 5.18 K/UL — SIGNIFICANT CHANGE UP (ref 1.8–7.4)
NEUTROPHILS NFR BLD AUTO: 83.6 % — HIGH (ref 43–77)
NRBC # BLD AUTO: 0 K/UL — SIGNIFICANT CHANGE UP (ref 0–0)
NRBC # FLD: 0 K/UL — SIGNIFICANT CHANGE UP (ref 0–0)
NRBC BLD AUTO-RTO: 0 /100 WBCS — SIGNIFICANT CHANGE UP (ref 0–0)
PLATELET # BLD AUTO: 294 K/UL — SIGNIFICANT CHANGE UP (ref 150–400)
PMV BLD: 9 FL — SIGNIFICANT CHANGE UP (ref 7–13)
RBC # BLD: 4.17 M/UL — SIGNIFICANT CHANGE UP (ref 3.8–5.2)
RBC # FLD: 12.7 % — SIGNIFICANT CHANGE UP (ref 10.3–14.5)
WBC # BLD: 6.2 K/UL — SIGNIFICANT CHANGE UP (ref 3.8–10.5)
WBC # FLD AUTO: 6.2 K/UL — SIGNIFICANT CHANGE UP (ref 3.8–10.5)

## 2025-04-03 ENCOUNTER — APPOINTMENT (OUTPATIENT)
Dept: HEMATOLOGY ONCOLOGY | Facility: CLINIC | Age: 70
End: 2025-04-03

## 2025-04-03 ENCOUNTER — RESULT REVIEW (OUTPATIENT)
Age: 70
End: 2025-04-03

## 2025-04-03 ENCOUNTER — APPOINTMENT (OUTPATIENT)
Dept: NEUROLOGY | Facility: CLINIC | Age: 70
End: 2025-04-03
Payer: MEDICARE

## 2025-04-03 ENCOUNTER — APPOINTMENT (OUTPATIENT)
Age: 70
End: 2025-04-03

## 2025-04-03 DIAGNOSIS — G81.94 HEMIPLEGIA, UNSPECIFIED AFFECTING LEFT NONDOMINANT SIDE: ICD-10-CM

## 2025-04-03 DIAGNOSIS — G40.909 EPILEPSY, UNSPECIFIED, NOT INTRACTABLE, W/OUT STATUS EPILEPTICUS: ICD-10-CM

## 2025-04-03 DIAGNOSIS — C71.9 MALIGNANT NEOPLASM OF BRAIN, UNSPECIFIED: ICD-10-CM

## 2025-04-03 LAB
BASOPHILS # BLD AUTO: 0.02 K/UL — SIGNIFICANT CHANGE UP (ref 0–0.2)
BASOPHILS # BLD MANUAL: 0 K/UL — SIGNIFICANT CHANGE UP (ref 0–0.2)
BASOPHILS NFR BLD AUTO: 0.2 % — SIGNIFICANT CHANGE UP (ref 0–2)
BASOPHILS NFR BLD MANUAL: 0 % — SIGNIFICANT CHANGE UP (ref 0–2)
EOSINOPHIL # BLD AUTO: 0.01 K/UL — SIGNIFICANT CHANGE UP (ref 0–0.5)
EOSINOPHIL # BLD MANUAL: 0 K/UL — SIGNIFICANT CHANGE UP (ref 0–0.5)
EOSINOPHIL NFR BLD AUTO: 0.1 % — SIGNIFICANT CHANGE UP (ref 0–6)
EOSINOPHIL NFR BLD MANUAL: 0 % — SIGNIFICANT CHANGE UP (ref 0–6)
HCT VFR BLD CALC: 41.1 % — SIGNIFICANT CHANGE UP (ref 34.5–45)
HGB BLD-MCNC: 13.3 G/DL — SIGNIFICANT CHANGE UP (ref 11.5–15.5)
IMM GRANULOCYTES # BLD AUTO: 0.07 K/UL — SIGNIFICANT CHANGE UP (ref 0–0.07)
IMM GRANULOCYTES NFR BLD AUTO: 0.8 % — SIGNIFICANT CHANGE UP (ref 0–0.9)
LG PLATELETS BLD QL AUTO: SLIGHT — SIGNIFICANT CHANGE UP
LYMPHOCYTES # BLD AUTO: 0.46 K/UL — LOW (ref 1–3.3)
LYMPHOCYTES # BLD MANUAL: 0.09 K/UL — LOW (ref 1–3.3)
LYMPHOCYTES NFR BLD AUTO: 5 % — LOW (ref 13–44)
LYMPHOCYTES NFR BLD MANUAL: 1 % — LOW (ref 13–44)
MANUAL REACTIVE LYMPHOCYTES #: 0.18 K/UL — SIGNIFICANT CHANGE UP (ref 0–0.63)
MANUAL SMEAR VERIFICATION: YES — SIGNIFICANT CHANGE UP
MCHC RBC-ENTMCNC: 31.7 PG — SIGNIFICANT CHANGE UP (ref 27–34)
MCHC RBC-ENTMCNC: 32.4 G/DL — SIGNIFICANT CHANGE UP (ref 32–36)
MCV RBC AUTO: 98.1 FL — SIGNIFICANT CHANGE UP (ref 80–100)
MONOCYTES # BLD AUTO: 0.59 K/UL — SIGNIFICANT CHANGE UP (ref 0–0.9)
MONOCYTES # BLD MANUAL: 0.55 K/UL — SIGNIFICANT CHANGE UP (ref 0–0.9)
MONOCYTES NFR BLD AUTO: 6.4 % — SIGNIFICANT CHANGE UP (ref 2–14)
MONOCYTES NFR BLD MANUAL: 6 % — SIGNIFICANT CHANGE UP (ref 2–14)
NEUTROPHILS # BLD AUTO: 8.02 K/UL — HIGH (ref 1.8–7.4)
NEUTROPHILS # BLD MANUAL: 8.34 K/UL — HIGH (ref 1.8–7.4)
NEUTROPHILS NFR BLD AUTO: 87.5 % — HIGH (ref 43–77)
NEUTROPHILS NFR BLD MANUAL: 91 % — HIGH (ref 43–77)
NRBC # BLD AUTO: 0 K/UL — SIGNIFICANT CHANGE UP (ref 0–0)
NRBC # FLD: 0 K/UL — SIGNIFICANT CHANGE UP (ref 0–0)
NRBC BLD AUTO-RTO: 0 /100 WBCS — SIGNIFICANT CHANGE UP (ref 0–0)
PLAT MORPH BLD: NORMAL — SIGNIFICANT CHANGE UP
PLATELET # BLD AUTO: 246 K/UL — SIGNIFICANT CHANGE UP (ref 150–400)
PMV BLD: 9.8 FL — SIGNIFICANT CHANGE UP (ref 7–13)
POLYCHROMASIA BLD QL SMEAR: SLIGHT — SIGNIFICANT CHANGE UP
RBC # BLD: 4.19 M/UL — SIGNIFICANT CHANGE UP (ref 3.8–5.2)
RBC # FLD: 13.1 % — SIGNIFICANT CHANGE UP (ref 10.3–14.5)
RBC BLD AUTO: NORMAL — SIGNIFICANT CHANGE UP
VARIANT LYMPHS # BLD: 2 % — SIGNIFICANT CHANGE UP (ref 0–6)
VARIANT LYMPHS NFR BLD MANUAL: 2 % — SIGNIFICANT CHANGE UP (ref 0–6)
WBC # BLD: 9.17 K/UL — SIGNIFICANT CHANGE UP (ref 3.8–10.5)
WBC # FLD AUTO: 9.17 K/UL — SIGNIFICANT CHANGE UP (ref 3.8–10.5)
WBC MORPHOLOGY: NORMAL — SIGNIFICANT CHANGE UP

## 2025-04-03 PROCEDURE — 99215 OFFICE O/P EST HI 40 MIN: CPT

## 2025-04-04 LAB
ALBUMIN SERPL ELPH-MCNC: 4.5 G/DL — SIGNIFICANT CHANGE UP (ref 3.3–5)
ALP SERPL-CCNC: 61 U/L — SIGNIFICANT CHANGE UP (ref 40–120)
ALT FLD-CCNC: 15 U/L — SIGNIFICANT CHANGE UP (ref 10–45)
ANION GAP SERPL CALC-SCNC: 15 MMOL/L — SIGNIFICANT CHANGE UP (ref 5–17)
AST SERPL-CCNC: 22 U/L — SIGNIFICANT CHANGE UP (ref 10–40)
BILIRUB SERPL-MCNC: 0.3 MG/DL — SIGNIFICANT CHANGE UP (ref 0.2–1.2)
BUN SERPL-MCNC: 20 MG/DL — SIGNIFICANT CHANGE UP (ref 7–23)
CALCIUM SERPL-MCNC: 9.9 MG/DL — SIGNIFICANT CHANGE UP (ref 8.4–10.5)
CHLORIDE SERPL-SCNC: 106 MMOL/L — SIGNIFICANT CHANGE UP (ref 96–108)
CO2 SERPL-SCNC: 25 MMOL/L — SIGNIFICANT CHANGE UP (ref 22–31)
CREAT ?TM UR-MCNC: 80 MG/DL — SIGNIFICANT CHANGE UP
CREAT SERPL-MCNC: 0.58 MG/DL — SIGNIFICANT CHANGE UP (ref 0.5–1.3)
EGFR: 97 ML/MIN/1.73M2 — SIGNIFICANT CHANGE UP
EGFR: 97 ML/MIN/1.73M2 — SIGNIFICANT CHANGE UP
GLUCOSE SERPL-MCNC: 129 MG/DL — HIGH (ref 70–99)
POTASSIUM SERPL-MCNC: 4 MMOL/L — SIGNIFICANT CHANGE UP (ref 3.5–5.3)
POTASSIUM SERPL-SCNC: 4 MMOL/L — SIGNIFICANT CHANGE UP (ref 3.5–5.3)
PROT ?TM UR-MCNC: 8 MG/DL — SIGNIFICANT CHANGE UP (ref 0–12)
PROT SERPL-MCNC: 6.8 G/DL — SIGNIFICANT CHANGE UP (ref 6–8.3)
PROT/CREAT UR-RTO: 0.1 RATIO — SIGNIFICANT CHANGE UP (ref 0–0.2)
SODIUM SERPL-SCNC: 145 MMOL/L — SIGNIFICANT CHANGE UP (ref 135–145)

## 2025-04-10 ENCOUNTER — RESULT REVIEW (OUTPATIENT)
Age: 70
End: 2025-04-10

## 2025-04-10 ENCOUNTER — APPOINTMENT (OUTPATIENT)
Dept: HEMATOLOGY ONCOLOGY | Facility: CLINIC | Age: 70
End: 2025-04-10

## 2025-04-10 LAB
BASOPHILS # BLD AUTO: 0.03 K/UL — SIGNIFICANT CHANGE UP (ref 0–0.2)
BASOPHILS NFR BLD AUTO: 0.4 % — SIGNIFICANT CHANGE UP (ref 0–2)
EOSINOPHIL # BLD AUTO: 0.03 K/UL — SIGNIFICANT CHANGE UP (ref 0–0.5)
EOSINOPHIL NFR BLD AUTO: 0.4 % — SIGNIFICANT CHANGE UP (ref 0–6)
HCT VFR BLD CALC: 42.3 % — SIGNIFICANT CHANGE UP (ref 34.5–45)
HGB BLD-MCNC: 13.6 G/DL — SIGNIFICANT CHANGE UP (ref 11.5–15.5)
IMM GRANULOCYTES # BLD AUTO: 0.09 K/UL — HIGH (ref 0–0.07)
IMM GRANULOCYTES NFR BLD AUTO: 1.3 % — HIGH (ref 0–0.9)
LYMPHOCYTES # BLD AUTO: 0.59 K/UL — LOW (ref 1–3.3)
LYMPHOCYTES NFR BLD AUTO: 8.6 % — LOW (ref 13–44)
MCHC RBC-ENTMCNC: 31.3 PG — SIGNIFICANT CHANGE UP (ref 27–34)
MCHC RBC-ENTMCNC: 32.2 G/DL — SIGNIFICANT CHANGE UP (ref 32–36)
MCV RBC AUTO: 97.2 FL — SIGNIFICANT CHANGE UP (ref 80–100)
MONOCYTES # BLD AUTO: 0.54 K/UL — SIGNIFICANT CHANGE UP (ref 0–0.9)
MONOCYTES NFR BLD AUTO: 7.9 % — SIGNIFICANT CHANGE UP (ref 2–14)
NEUTROPHILS # BLD AUTO: 5.57 K/UL — SIGNIFICANT CHANGE UP (ref 1.8–7.4)
NEUTROPHILS NFR BLD AUTO: 81.4 % — HIGH (ref 43–77)
NRBC # BLD AUTO: 0 K/UL — SIGNIFICANT CHANGE UP (ref 0–0)
NRBC # FLD: 0 K/UL — SIGNIFICANT CHANGE UP (ref 0–0)
NRBC BLD AUTO-RTO: 0 /100 WBCS — SIGNIFICANT CHANGE UP (ref 0–0)
PLATELET # BLD AUTO: 232 K/UL — SIGNIFICANT CHANGE UP (ref 150–400)
PMV BLD: 8.7 FL — SIGNIFICANT CHANGE UP (ref 7–13)
RBC # BLD: 4.35 M/UL — SIGNIFICANT CHANGE UP (ref 3.8–5.2)
RBC # FLD: 12.7 % — SIGNIFICANT CHANGE UP (ref 10.3–14.5)
WBC # BLD: 6.85 K/UL — SIGNIFICANT CHANGE UP (ref 3.8–10.5)
WBC # FLD AUTO: 6.85 K/UL — SIGNIFICANT CHANGE UP (ref 3.8–10.5)

## 2025-04-17 ENCOUNTER — RESULT REVIEW (OUTPATIENT)
Age: 70
End: 2025-04-17

## 2025-04-17 ENCOUNTER — APPOINTMENT (OUTPATIENT)
Dept: HEMATOLOGY ONCOLOGY | Facility: CLINIC | Age: 70
End: 2025-04-17

## 2025-04-17 LAB
BASOPHILS # BLD AUTO: 0.03 K/UL — SIGNIFICANT CHANGE UP (ref 0–0.2)
BASOPHILS NFR BLD AUTO: 0.4 % — SIGNIFICANT CHANGE UP (ref 0–2)
EOSINOPHIL # BLD AUTO: 0 K/UL — SIGNIFICANT CHANGE UP (ref 0–0.5)
EOSINOPHIL NFR BLD AUTO: 0 % — SIGNIFICANT CHANGE UP (ref 0–6)
HCT VFR BLD CALC: 41.6 % — SIGNIFICANT CHANGE UP (ref 34.5–45)
HGB BLD-MCNC: 13.5 G/DL — SIGNIFICANT CHANGE UP (ref 11.5–15.5)
IMM GRANULOCYTES # BLD AUTO: 0.08 K/UL — HIGH (ref 0–0.07)
IMM GRANULOCYTES NFR BLD AUTO: 1.1 % — HIGH (ref 0–0.9)
LYMPHOCYTES # BLD AUTO: 0.75 K/UL — LOW (ref 1–3.3)
LYMPHOCYTES NFR BLD AUTO: 9.9 % — LOW (ref 13–44)
MCHC RBC-ENTMCNC: 31.3 PG — SIGNIFICANT CHANGE UP (ref 27–34)
MCHC RBC-ENTMCNC: 32.5 G/DL — SIGNIFICANT CHANGE UP (ref 32–36)
MCV RBC AUTO: 96.3 FL — SIGNIFICANT CHANGE UP (ref 80–100)
MONOCYTES # BLD AUTO: 0.38 K/UL — SIGNIFICANT CHANGE UP (ref 0–0.9)
MONOCYTES NFR BLD AUTO: 5 % — SIGNIFICANT CHANGE UP (ref 2–14)
NEUTROPHILS # BLD AUTO: 6.32 K/UL — SIGNIFICANT CHANGE UP (ref 1.8–7.4)
NEUTROPHILS NFR BLD AUTO: 83.6 % — HIGH (ref 43–77)
NRBC # BLD AUTO: 0 K/UL — SIGNIFICANT CHANGE UP (ref 0–0)
NRBC # FLD: 0 K/UL — SIGNIFICANT CHANGE UP (ref 0–0)
NRBC BLD AUTO-RTO: 0 /100 WBCS — SIGNIFICANT CHANGE UP (ref 0–0)
PLATELET # BLD AUTO: 248 K/UL — SIGNIFICANT CHANGE UP (ref 150–400)
PMV BLD: 8.9 FL — SIGNIFICANT CHANGE UP (ref 7–13)
RBC # BLD: 4.32 M/UL — SIGNIFICANT CHANGE UP (ref 3.8–5.2)
RBC # FLD: 12.9 % — SIGNIFICANT CHANGE UP (ref 10.3–14.5)
WBC # BLD: 7.56 K/UL — SIGNIFICANT CHANGE UP (ref 3.8–10.5)
WBC # FLD AUTO: 7.56 K/UL — SIGNIFICANT CHANGE UP (ref 3.8–10.5)

## 2025-04-18 ENCOUNTER — OUTPATIENT (OUTPATIENT)
Dept: OUTPATIENT SERVICES | Facility: HOSPITAL | Age: 70
LOS: 1 days | Discharge: HOME | End: 2025-04-18

## 2025-04-18 DIAGNOSIS — Z87.898 PERSONAL HISTORY OF OTHER SPECIFIED CONDITIONS: Chronic | ICD-10-CM

## 2025-04-18 DIAGNOSIS — Z98.890 OTHER SPECIFIED POSTPROCEDURAL STATES: Chronic | ICD-10-CM

## 2025-04-18 DIAGNOSIS — C71.9 MALIGNANT NEOPLASM OF BRAIN, UNSPECIFIED: ICD-10-CM

## 2025-04-23 ENCOUNTER — APPOINTMENT (OUTPATIENT)
Dept: MRI IMAGING | Facility: CLINIC | Age: 70
End: 2025-04-23
Payer: MEDICARE

## 2025-04-23 ENCOUNTER — OUTPATIENT (OUTPATIENT)
Dept: OUTPATIENT SERVICES | Facility: HOSPITAL | Age: 70
LOS: 1 days | End: 2025-04-23

## 2025-04-23 DIAGNOSIS — Z98.890 OTHER SPECIFIED POSTPROCEDURAL STATES: Chronic | ICD-10-CM

## 2025-04-23 DIAGNOSIS — Z87.898 PERSONAL HISTORY OF OTHER SPECIFIED CONDITIONS: Chronic | ICD-10-CM

## 2025-04-23 DIAGNOSIS — C71.9 MALIGNANT NEOPLASM OF BRAIN, UNSPECIFIED: ICD-10-CM

## 2025-04-23 PROCEDURE — 70553 MRI BRAIN STEM W/O & W/DYE: CPT | Mod: 26

## 2025-04-24 ENCOUNTER — RESULT REVIEW (OUTPATIENT)
Age: 70
End: 2025-04-24

## 2025-04-24 ENCOUNTER — APPOINTMENT (OUTPATIENT)
Dept: NEUROLOGY | Facility: CLINIC | Age: 70
End: 2025-04-24
Payer: MEDICARE

## 2025-04-24 ENCOUNTER — APPOINTMENT (OUTPATIENT)
Age: 70
End: 2025-04-24

## 2025-04-24 DIAGNOSIS — G81.94 HEMIPLEGIA, UNSPECIFIED AFFECTING LEFT NONDOMINANT SIDE: ICD-10-CM

## 2025-04-24 DIAGNOSIS — G40.909 EPILEPSY, UNSPECIFIED, NOT INTRACTABLE, W/OUT STATUS EPILEPTICUS: ICD-10-CM

## 2025-04-24 DIAGNOSIS — G72.0 DRUG-INDUCED MYOPATHY: ICD-10-CM

## 2025-04-24 DIAGNOSIS — T38.0X5A DRUG-INDUCED MYOPATHY: ICD-10-CM

## 2025-04-24 DIAGNOSIS — C71.9 MALIGNANT NEOPLASM OF BRAIN, UNSPECIFIED: ICD-10-CM

## 2025-04-24 LAB
BASOPHILS # BLD AUTO: 0.03 K/UL — SIGNIFICANT CHANGE UP (ref 0–0.2)
BASOPHILS NFR BLD AUTO: 0.4 % — SIGNIFICANT CHANGE UP (ref 0–2)
EOSINOPHIL # BLD AUTO: 0 K/UL — SIGNIFICANT CHANGE UP (ref 0–0.5)
EOSINOPHIL NFR BLD AUTO: 0 % — SIGNIFICANT CHANGE UP (ref 0–6)
HCT VFR BLD CALC: 40.7 % — SIGNIFICANT CHANGE UP (ref 34.5–45)
HGB BLD-MCNC: 13.3 G/DL — SIGNIFICANT CHANGE UP (ref 11.5–15.5)
IMM GRANULOCYTES # BLD AUTO: 0.09 K/UL — HIGH (ref 0–0.07)
IMM GRANULOCYTES NFR BLD AUTO: 1.3 % — HIGH (ref 0–0.9)
LYMPHOCYTES # BLD AUTO: 0.76 K/UL — LOW (ref 1–3.3)
LYMPHOCYTES NFR BLD AUTO: 10.6 % — LOW (ref 13–44)
MCHC RBC-ENTMCNC: 31.4 PG — SIGNIFICANT CHANGE UP (ref 27–34)
MCHC RBC-ENTMCNC: 32.7 G/DL — SIGNIFICANT CHANGE UP (ref 32–36)
MCV RBC AUTO: 96 FL — SIGNIFICANT CHANGE UP (ref 80–100)
MONOCYTES # BLD AUTO: 0.48 K/UL — SIGNIFICANT CHANGE UP (ref 0–0.9)
MONOCYTES NFR BLD AUTO: 6.7 % — SIGNIFICANT CHANGE UP (ref 2–14)
NEUTROPHILS # BLD AUTO: 5.78 K/UL — SIGNIFICANT CHANGE UP (ref 1.8–7.4)
NEUTROPHILS NFR BLD AUTO: 81 % — HIGH (ref 43–77)
NRBC # BLD AUTO: 0 K/UL — SIGNIFICANT CHANGE UP (ref 0–0)
NRBC # FLD: 0 K/UL — SIGNIFICANT CHANGE UP (ref 0–0)
NRBC BLD AUTO-RTO: 0 /100 WBCS — SIGNIFICANT CHANGE UP (ref 0–0)
PLATELET # BLD AUTO: 227 K/UL — SIGNIFICANT CHANGE UP (ref 150–400)
PMV BLD: 9.3 FL — SIGNIFICANT CHANGE UP (ref 7–13)
RBC # BLD: 4.24 M/UL — SIGNIFICANT CHANGE UP (ref 3.8–5.2)
RBC # FLD: 12.9 % — SIGNIFICANT CHANGE UP (ref 10.3–14.5)
WBC # BLD: 7.14 K/UL — SIGNIFICANT CHANGE UP (ref 3.8–10.5)
WBC # FLD AUTO: 7.14 K/UL — SIGNIFICANT CHANGE UP (ref 3.8–10.5)

## 2025-04-24 PROCEDURE — 99215 OFFICE O/P EST HI 40 MIN: CPT

## 2025-04-25 DIAGNOSIS — Z51.11 ENCOUNTER FOR ANTINEOPLASTIC CHEMOTHERAPY: ICD-10-CM

## 2025-04-25 LAB
ALBUMIN SERPL ELPH-MCNC: 4.4 G/DL — SIGNIFICANT CHANGE UP (ref 3.3–5)
ALP SERPL-CCNC: 68 U/L — SIGNIFICANT CHANGE UP (ref 40–120)
ALT FLD-CCNC: 23 U/L — SIGNIFICANT CHANGE UP (ref 10–45)
ANION GAP SERPL CALC-SCNC: 19 MMOL/L — HIGH (ref 5–17)
AST SERPL-CCNC: 15 U/L — SIGNIFICANT CHANGE UP (ref 10–40)
BILIRUB SERPL-MCNC: 0.4 MG/DL — SIGNIFICANT CHANGE UP (ref 0.2–1.2)
BUN SERPL-MCNC: 28 MG/DL — HIGH (ref 7–23)
CALCIUM SERPL-MCNC: 9.9 MG/DL — SIGNIFICANT CHANGE UP (ref 8.4–10.5)
CHLORIDE SERPL-SCNC: 102 MMOL/L — SIGNIFICANT CHANGE UP (ref 96–108)
CO2 SERPL-SCNC: 23 MMOL/L — SIGNIFICANT CHANGE UP (ref 22–31)
CREAT SERPL-MCNC: 0.86 MG/DL — SIGNIFICANT CHANGE UP (ref 0.5–1.3)
EGFR: 73 ML/MIN/1.73M2 — SIGNIFICANT CHANGE UP
EGFR: 73 ML/MIN/1.73M2 — SIGNIFICANT CHANGE UP
GLUCOSE SERPL-MCNC: 128 MG/DL — HIGH (ref 70–99)
POTASSIUM SERPL-MCNC: 4.4 MMOL/L — SIGNIFICANT CHANGE UP (ref 3.5–5.3)
POTASSIUM SERPL-SCNC: 4.4 MMOL/L — SIGNIFICANT CHANGE UP (ref 3.5–5.3)
PROT SERPL-MCNC: 6.7 G/DL — SIGNIFICANT CHANGE UP (ref 6–8.3)
SODIUM SERPL-SCNC: 144 MMOL/L — SIGNIFICANT CHANGE UP (ref 135–145)

## 2025-05-01 ENCOUNTER — APPOINTMENT (OUTPATIENT)
Dept: HEMATOLOGY ONCOLOGY | Facility: CLINIC | Age: 70
End: 2025-05-01

## 2025-05-01 ENCOUNTER — RESULT REVIEW (OUTPATIENT)
Age: 70
End: 2025-05-01

## 2025-05-01 LAB
BASOPHILS # BLD AUTO: 0.02 K/UL — SIGNIFICANT CHANGE UP (ref 0–0.2)
BASOPHILS NFR BLD AUTO: 0.4 % — SIGNIFICANT CHANGE UP (ref 0–2)
EOSINOPHIL # BLD AUTO: 0.01 K/UL — SIGNIFICANT CHANGE UP (ref 0–0.5)
EOSINOPHIL NFR BLD AUTO: 0.2 % — SIGNIFICANT CHANGE UP (ref 0–6)
HCT VFR BLD CALC: 42 % — SIGNIFICANT CHANGE UP (ref 34.5–45)
HGB BLD-MCNC: 13.5 G/DL — SIGNIFICANT CHANGE UP (ref 11.5–15.5)
IMM GRANULOCYTES # BLD AUTO: 0.06 K/UL — SIGNIFICANT CHANGE UP (ref 0–0.07)
IMM GRANULOCYTES NFR BLD AUTO: 1.2 % — HIGH (ref 0–0.9)
LYMPHOCYTES # BLD AUTO: 0.7 K/UL — LOW (ref 1–3.3)
LYMPHOCYTES NFR BLD AUTO: 13.5 % — SIGNIFICANT CHANGE UP (ref 13–44)
MCHC RBC-ENTMCNC: 31.3 PG — SIGNIFICANT CHANGE UP (ref 27–34)
MCHC RBC-ENTMCNC: 32.1 G/DL — SIGNIFICANT CHANGE UP (ref 32–36)
MCV RBC AUTO: 97.4 FL — SIGNIFICANT CHANGE UP (ref 80–100)
MONOCYTES # BLD AUTO: 0.48 K/UL — SIGNIFICANT CHANGE UP (ref 0–0.9)
MONOCYTES NFR BLD AUTO: 9.3 % — SIGNIFICANT CHANGE UP (ref 2–14)
NEUTROPHILS # BLD AUTO: 3.91 K/UL — SIGNIFICANT CHANGE UP (ref 1.8–7.4)
NEUTROPHILS NFR BLD AUTO: 75.4 % — SIGNIFICANT CHANGE UP (ref 43–77)
NRBC # BLD AUTO: 0 K/UL — SIGNIFICANT CHANGE UP (ref 0–0)
NRBC # FLD: 0 K/UL — SIGNIFICANT CHANGE UP (ref 0–0)
NRBC BLD AUTO-RTO: 0 /100 WBCS — SIGNIFICANT CHANGE UP (ref 0–0)
PLATELET # BLD AUTO: 209 K/UL — SIGNIFICANT CHANGE UP (ref 150–400)
PMV BLD: 8.8 FL — SIGNIFICANT CHANGE UP (ref 7–13)
RBC # BLD: 4.31 M/UL — SIGNIFICANT CHANGE UP (ref 3.8–5.2)
RBC # FLD: 12.8 % — SIGNIFICANT CHANGE UP (ref 10.3–14.5)
WBC # BLD: 5.18 K/UL — SIGNIFICANT CHANGE UP (ref 3.8–10.5)
WBC # FLD AUTO: 5.18 K/UL — SIGNIFICANT CHANGE UP (ref 3.8–10.5)

## 2025-05-08 ENCOUNTER — RESULT REVIEW (OUTPATIENT)
Age: 70
End: 2025-05-08

## 2025-05-08 ENCOUNTER — APPOINTMENT (OUTPATIENT)
Dept: HEMATOLOGY ONCOLOGY | Facility: CLINIC | Age: 70
End: 2025-05-08

## 2025-05-08 LAB
BASOPHILS # BLD AUTO: 0.02 K/UL — SIGNIFICANT CHANGE UP (ref 0–0.2)
BASOPHILS NFR BLD AUTO: 0.4 % — SIGNIFICANT CHANGE UP (ref 0–2)
EOSINOPHIL # BLD AUTO: 0.02 K/UL — SIGNIFICANT CHANGE UP (ref 0–0.5)
EOSINOPHIL NFR BLD AUTO: 0.4 % — SIGNIFICANT CHANGE UP (ref 0–6)
HCT VFR BLD CALC: 41.2 % — SIGNIFICANT CHANGE UP (ref 34.5–45)
HGB BLD-MCNC: 13.6 G/DL — SIGNIFICANT CHANGE UP (ref 11.5–15.5)
IMM GRANULOCYTES # BLD AUTO: 0.04 K/UL — SIGNIFICANT CHANGE UP (ref 0–0.07)
IMM GRANULOCYTES NFR BLD AUTO: 0.8 % — SIGNIFICANT CHANGE UP (ref 0–0.9)
LYMPHOCYTES # BLD AUTO: 0.62 K/UL — LOW (ref 1–3.3)
LYMPHOCYTES NFR BLD AUTO: 11.7 % — LOW (ref 13–44)
MCHC RBC-ENTMCNC: 31.8 PG — SIGNIFICANT CHANGE UP (ref 27–34)
MCHC RBC-ENTMCNC: 33 G/DL — SIGNIFICANT CHANGE UP (ref 32–36)
MCV RBC AUTO: 96.3 FL — SIGNIFICANT CHANGE UP (ref 80–100)
MONOCYTES # BLD AUTO: 0.48 K/UL — SIGNIFICANT CHANGE UP (ref 0–0.9)
MONOCYTES NFR BLD AUTO: 9.1 % — SIGNIFICANT CHANGE UP (ref 2–14)
NEUTROPHILS # BLD AUTO: 4.11 K/UL — SIGNIFICANT CHANGE UP (ref 1.8–7.4)
NEUTROPHILS NFR BLD AUTO: 77.6 % — HIGH (ref 43–77)
NRBC # BLD AUTO: 0 K/UL — SIGNIFICANT CHANGE UP (ref 0–0)
NRBC # FLD: 0 K/UL — SIGNIFICANT CHANGE UP (ref 0–0)
NRBC BLD AUTO-RTO: 0 /100 WBCS — SIGNIFICANT CHANGE UP (ref 0–0)
PLATELET # BLD AUTO: 249 K/UL — SIGNIFICANT CHANGE UP (ref 150–400)
PMV BLD: 8.9 FL — SIGNIFICANT CHANGE UP (ref 7–13)
RBC # BLD: 4.28 M/UL — SIGNIFICANT CHANGE UP (ref 3.8–5.2)
RBC # FLD: 12.9 % — SIGNIFICANT CHANGE UP (ref 10.3–14.5)
WBC # BLD: 5.29 K/UL — SIGNIFICANT CHANGE UP (ref 3.8–10.5)
WBC # FLD AUTO: 5.29 K/UL — SIGNIFICANT CHANGE UP (ref 3.8–10.5)

## 2025-05-15 ENCOUNTER — RESULT REVIEW (OUTPATIENT)
Age: 70
End: 2025-05-15

## 2025-05-15 ENCOUNTER — APPOINTMENT (OUTPATIENT)
Dept: NEUROLOGY | Facility: CLINIC | Age: 70
End: 2025-05-15
Payer: MEDICARE

## 2025-05-15 ENCOUNTER — APPOINTMENT (OUTPATIENT)
Age: 70
End: 2025-05-15

## 2025-05-15 VITALS
OXYGEN SATURATION: 94 % | BODY MASS INDEX: 27.39 KG/M2 | HEIGHT: 61 IN | DIASTOLIC BLOOD PRESSURE: 74 MMHG | HEART RATE: 73 BPM | WEIGHT: 145.06 LBS | TEMPERATURE: 97.1 F | SYSTOLIC BLOOD PRESSURE: 149 MMHG

## 2025-05-15 DIAGNOSIS — C71.9 MALIGNANT NEOPLASM OF BRAIN, UNSPECIFIED: ICD-10-CM

## 2025-05-15 DIAGNOSIS — G81.94 HEMIPLEGIA, UNSPECIFIED AFFECTING LEFT NONDOMINANT SIDE: ICD-10-CM

## 2025-05-15 DIAGNOSIS — G93.6 CEREBRAL EDEMA: ICD-10-CM

## 2025-05-15 DIAGNOSIS — G40.909 EPILEPSY, UNSPECIFIED, NOT INTRACTABLE, W/OUT STATUS EPILEPTICUS: ICD-10-CM

## 2025-05-15 LAB
BASOPHILS # BLD AUTO: 0.02 K/UL — SIGNIFICANT CHANGE UP (ref 0–0.2)
BASOPHILS NFR BLD AUTO: 0.4 % — SIGNIFICANT CHANGE UP (ref 0–2)
EOSINOPHIL # BLD AUTO: 0.01 K/UL — SIGNIFICANT CHANGE UP (ref 0–0.5)
EOSINOPHIL NFR BLD AUTO: 0.2 % — SIGNIFICANT CHANGE UP (ref 0–6)
HCT VFR BLD CALC: 41.3 % — SIGNIFICANT CHANGE UP (ref 34.5–45)
HGB BLD-MCNC: 13.3 G/DL — SIGNIFICANT CHANGE UP (ref 11.5–15.5)
IMM GRANULOCYTES # BLD AUTO: 0.04 K/UL — SIGNIFICANT CHANGE UP (ref 0–0.07)
IMM GRANULOCYTES NFR BLD AUTO: 0.7 % — SIGNIFICANT CHANGE UP (ref 0–0.9)
LYMPHOCYTES # BLD AUTO: 0.78 K/UL — LOW (ref 1–3.3)
LYMPHOCYTES NFR BLD AUTO: 14.3 % — SIGNIFICANT CHANGE UP (ref 13–44)
MCHC RBC-ENTMCNC: 31.4 PG — SIGNIFICANT CHANGE UP (ref 27–34)
MCHC RBC-ENTMCNC: 32.2 G/DL — SIGNIFICANT CHANGE UP (ref 32–36)
MCV RBC AUTO: 97.4 FL — SIGNIFICANT CHANGE UP (ref 80–100)
MONOCYTES # BLD AUTO: 0.55 K/UL — SIGNIFICANT CHANGE UP (ref 0–0.9)
MONOCYTES NFR BLD AUTO: 10.1 % — SIGNIFICANT CHANGE UP (ref 2–14)
NEUTROPHILS # BLD AUTO: 4.07 K/UL — SIGNIFICANT CHANGE UP (ref 1.8–7.4)
NEUTROPHILS NFR BLD AUTO: 74.3 % — SIGNIFICANT CHANGE UP (ref 43–77)
NRBC # BLD AUTO: 0 K/UL — SIGNIFICANT CHANGE UP (ref 0–0)
NRBC # FLD: 0 K/UL — SIGNIFICANT CHANGE UP (ref 0–0)
NRBC BLD AUTO-RTO: 0 /100 WBCS — SIGNIFICANT CHANGE UP (ref 0–0)
PLATELET # BLD AUTO: 292 K/UL — SIGNIFICANT CHANGE UP (ref 150–400)
PMV BLD: 9.3 FL — SIGNIFICANT CHANGE UP (ref 7–13)
RBC # BLD: 4.24 M/UL — SIGNIFICANT CHANGE UP (ref 3.8–5.2)
RBC # FLD: 13 % — SIGNIFICANT CHANGE UP (ref 10.3–14.5)
WBC # BLD: 5.47 K/UL — SIGNIFICANT CHANGE UP (ref 3.8–10.5)
WBC # FLD AUTO: 5.47 K/UL — SIGNIFICANT CHANGE UP (ref 3.8–10.5)

## 2025-05-15 PROCEDURE — 99215 OFFICE O/P EST HI 40 MIN: CPT

## 2025-05-15 NOTE — ED ADULT NURSE NOTE - EXTENSIONS OF SELF_ADULT
May 15, 2025         Patient: Jennifer Tipton   YOB: 2005   Date of Visit: 5/15/2025           To Whom it May Concern:    Jennifer Tipton was seen in my clinic on 5/15/2025. She may return to school in 1 day.    If you have any questions or concerns, please don't hesitate to call.        Sincerely,           Dorian Craven M.D.  Electronically Signed      None

## 2025-05-16 LAB
ALBUMIN SERPL ELPH-MCNC: 4.4 G/DL — SIGNIFICANT CHANGE UP (ref 3.3–5)
ALP SERPL-CCNC: 45 U/L — SIGNIFICANT CHANGE UP (ref 40–120)
ALT FLD-CCNC: 24 U/L — SIGNIFICANT CHANGE UP (ref 10–40)
ANION GAP SERPL CALC-SCNC: 15 MMOL/L — SIGNIFICANT CHANGE UP (ref 5–17)
AST SERPL-CCNC: 26 U/L — SIGNIFICANT CHANGE UP (ref 10–35)
BILIRUB SERPL-MCNC: 0.4 MG/DL — SIGNIFICANT CHANGE UP (ref 0.2–1.2)
BUN SERPL-MCNC: 17 MG/DL — SIGNIFICANT CHANGE UP (ref 7–23)
CALCIUM SERPL-MCNC: 9.6 MG/DL — SIGNIFICANT CHANGE UP (ref 8.4–10.5)
CHLORIDE SERPL-SCNC: 105 MMOL/L — SIGNIFICANT CHANGE UP (ref 96–108)
CO2 SERPL-SCNC: 25 MMOL/L — SIGNIFICANT CHANGE UP (ref 22–31)
CREAT ?TM UR-MCNC: 57 MG/DL — SIGNIFICANT CHANGE UP
CREAT SERPL-MCNC: 0.53 MG/DL — SIGNIFICANT CHANGE UP (ref 0.5–1.3)
EGFR: 99 ML/MIN/1.73M2 — SIGNIFICANT CHANGE UP
EGFR: 99 ML/MIN/1.73M2 — SIGNIFICANT CHANGE UP
GLUCOSE SERPL-MCNC: 108 MG/DL — HIGH (ref 70–99)
POTASSIUM SERPL-MCNC: 4 MMOL/L — SIGNIFICANT CHANGE UP (ref 3.5–5.3)
POTASSIUM SERPL-SCNC: 4 MMOL/L — SIGNIFICANT CHANGE UP (ref 3.5–5.3)
PROT ?TM UR-MCNC: 13 MG/DL — HIGH (ref 0–12)
PROT SERPL-MCNC: 7.1 G/DL — SIGNIFICANT CHANGE UP (ref 6–8.3)
PROT/CREAT UR-RTO: 0.2 RATIO — SIGNIFICANT CHANGE UP (ref 0–0.2)
SODIUM SERPL-SCNC: 146 MMOL/L — HIGH (ref 135–145)

## 2025-05-20 ENCOUNTER — RX RENEWAL (OUTPATIENT)
Age: 70
End: 2025-05-20

## 2025-05-22 ENCOUNTER — RESULT REVIEW (OUTPATIENT)
Age: 70
End: 2025-05-22

## 2025-05-22 ENCOUNTER — APPOINTMENT (OUTPATIENT)
Dept: HEMATOLOGY ONCOLOGY | Facility: CLINIC | Age: 70
End: 2025-05-22

## 2025-05-22 LAB
BASOPHILS # BLD AUTO: 0.02 K/UL — SIGNIFICANT CHANGE UP (ref 0–0.2)
BASOPHILS NFR BLD AUTO: 0.3 % — SIGNIFICANT CHANGE UP (ref 0–2)
EOSINOPHIL # BLD AUTO: 0.01 K/UL — SIGNIFICANT CHANGE UP (ref 0–0.5)
EOSINOPHIL NFR BLD AUTO: 0.2 % — SIGNIFICANT CHANGE UP (ref 0–6)
HCT VFR BLD CALC: 42.2 % — SIGNIFICANT CHANGE UP (ref 34.5–45)
HGB BLD-MCNC: 13.4 G/DL — SIGNIFICANT CHANGE UP (ref 11.5–15.5)
IMM GRANULOCYTES # BLD AUTO: 0.03 K/UL — SIGNIFICANT CHANGE UP (ref 0–0.07)
IMM GRANULOCYTES NFR BLD AUTO: 0.5 % — SIGNIFICANT CHANGE UP (ref 0–0.9)
LYMPHOCYTES # BLD AUTO: 0.64 K/UL — LOW (ref 1–3.3)
LYMPHOCYTES NFR BLD AUTO: 10.9 % — LOW (ref 13–44)
MCHC RBC-ENTMCNC: 31.1 PG — SIGNIFICANT CHANGE UP (ref 27–34)
MCHC RBC-ENTMCNC: 31.8 G/DL — LOW (ref 32–36)
MCV RBC AUTO: 97.9 FL — SIGNIFICANT CHANGE UP (ref 80–100)
MONOCYTES # BLD AUTO: 0.4 K/UL — SIGNIFICANT CHANGE UP (ref 0–0.9)
MONOCYTES NFR BLD AUTO: 6.8 % — SIGNIFICANT CHANGE UP (ref 2–14)
NEUTROPHILS # BLD AUTO: 4.75 K/UL — SIGNIFICANT CHANGE UP (ref 1.8–7.4)
NEUTROPHILS NFR BLD AUTO: 81.3 % — HIGH (ref 43–77)
NRBC # BLD AUTO: 0 K/UL — SIGNIFICANT CHANGE UP (ref 0–0)
NRBC # FLD: 0 K/UL — SIGNIFICANT CHANGE UP (ref 0–0)
NRBC BLD AUTO-RTO: 0 /100 WBCS — SIGNIFICANT CHANGE UP (ref 0–0)
PLATELET # BLD AUTO: 245 K/UL — SIGNIFICANT CHANGE UP (ref 150–400)
PMV BLD: 9 FL — SIGNIFICANT CHANGE UP (ref 7–13)
RBC # BLD: 4.31 M/UL — SIGNIFICANT CHANGE UP (ref 3.8–5.2)
RBC # FLD: 12.9 % — SIGNIFICANT CHANGE UP (ref 10.3–14.5)
WBC # BLD: 5.85 K/UL — SIGNIFICANT CHANGE UP (ref 3.8–10.5)
WBC # FLD AUTO: 5.85 K/UL — SIGNIFICANT CHANGE UP (ref 3.8–10.5)

## 2025-05-29 ENCOUNTER — RESULT REVIEW (OUTPATIENT)
Age: 70
End: 2025-05-29

## 2025-05-29 ENCOUNTER — APPOINTMENT (OUTPATIENT)
Dept: HEMATOLOGY ONCOLOGY | Facility: CLINIC | Age: 70
End: 2025-05-29

## 2025-05-29 LAB
BASOPHILS # BLD AUTO: 0.03 K/UL — SIGNIFICANT CHANGE UP (ref 0–0.2)
BASOPHILS NFR BLD AUTO: 0.5 % — SIGNIFICANT CHANGE UP (ref 0–2)
EOSINOPHIL # BLD AUTO: 0.01 K/UL — SIGNIFICANT CHANGE UP (ref 0–0.5)
EOSINOPHIL NFR BLD AUTO: 0.2 % — SIGNIFICANT CHANGE UP (ref 0–6)
HCT VFR BLD CALC: 41.6 % — SIGNIFICANT CHANGE UP (ref 34.5–45)
HGB BLD-MCNC: 13.5 G/DL — SIGNIFICANT CHANGE UP (ref 11.5–15.5)
IMM GRANULOCYTES # BLD AUTO: 0.03 K/UL — SIGNIFICANT CHANGE UP (ref 0–0.07)
IMM GRANULOCYTES NFR BLD AUTO: 0.5 % — SIGNIFICANT CHANGE UP (ref 0–0.9)
LYMPHOCYTES # BLD AUTO: 0.62 K/UL — LOW (ref 1–3.3)
LYMPHOCYTES NFR BLD AUTO: 11.3 % — LOW (ref 13–44)
MCHC RBC-ENTMCNC: 31.5 PG — SIGNIFICANT CHANGE UP (ref 27–34)
MCHC RBC-ENTMCNC: 32.5 G/DL — SIGNIFICANT CHANGE UP (ref 32–36)
MCV RBC AUTO: 97.2 FL — SIGNIFICANT CHANGE UP (ref 80–100)
MONOCYTES # BLD AUTO: 0.37 K/UL — SIGNIFICANT CHANGE UP (ref 0–0.9)
MONOCYTES NFR BLD AUTO: 6.7 % — SIGNIFICANT CHANGE UP (ref 2–14)
NEUTROPHILS # BLD AUTO: 4.45 K/UL — SIGNIFICANT CHANGE UP (ref 1.8–7.4)
NEUTROPHILS NFR BLD AUTO: 80.8 % — HIGH (ref 43–77)
NRBC # BLD AUTO: 0 K/UL — SIGNIFICANT CHANGE UP (ref 0–0)
NRBC # FLD: 0 K/UL — SIGNIFICANT CHANGE UP (ref 0–0)
NRBC BLD AUTO-RTO: 0 /100 WBCS — SIGNIFICANT CHANGE UP (ref 0–0)
PLATELET # BLD AUTO: 235 K/UL — SIGNIFICANT CHANGE UP (ref 150–400)
PMV BLD: 9.3 FL — SIGNIFICANT CHANGE UP (ref 7–13)
RBC # BLD: 4.28 M/UL — SIGNIFICANT CHANGE UP (ref 3.8–5.2)
RBC # FLD: 12.7 % — SIGNIFICANT CHANGE UP (ref 10.3–14.5)
WBC # BLD: 5.51 K/UL — SIGNIFICANT CHANGE UP (ref 3.8–10.5)
WBC # FLD AUTO: 5.51 K/UL — SIGNIFICANT CHANGE UP (ref 3.8–10.5)

## 2025-06-05 ENCOUNTER — APPOINTMENT (OUTPATIENT)
Age: 70
End: 2025-06-05

## 2025-06-05 ENCOUNTER — APPOINTMENT (OUTPATIENT)
Dept: NEUROLOGY | Facility: CLINIC | Age: 70
End: 2025-06-05
Payer: MEDICARE

## 2025-06-05 ENCOUNTER — RESULT REVIEW (OUTPATIENT)
Age: 70
End: 2025-06-05

## 2025-06-05 VITALS
TEMPERATURE: 97.4 F | SYSTOLIC BLOOD PRESSURE: 123 MMHG | OXYGEN SATURATION: 95 % | HEIGHT: 61 IN | DIASTOLIC BLOOD PRESSURE: 72 MMHG | HEART RATE: 73 BPM | WEIGHT: 141 LBS | BODY MASS INDEX: 26.62 KG/M2

## 2025-06-05 LAB
BASOPHILS # BLD AUTO: 0.02 K/UL — SIGNIFICANT CHANGE UP (ref 0–0.2)
BASOPHILS NFR BLD AUTO: 0.3 % — SIGNIFICANT CHANGE UP (ref 0–2)
EOSINOPHIL # BLD AUTO: 0.02 K/UL — SIGNIFICANT CHANGE UP (ref 0–0.5)
EOSINOPHIL NFR BLD AUTO: 0.3 % — SIGNIFICANT CHANGE UP (ref 0–6)
HCT VFR BLD CALC: 40.5 % — SIGNIFICANT CHANGE UP (ref 34.5–45)
HGB BLD-MCNC: 12.9 G/DL — SIGNIFICANT CHANGE UP (ref 11.5–15.5)
IMM GRANULOCYTES # BLD AUTO: 0.04 K/UL — SIGNIFICANT CHANGE UP (ref 0–0.07)
IMM GRANULOCYTES NFR BLD AUTO: 0.6 % — SIGNIFICANT CHANGE UP (ref 0–0.9)
LYMPHOCYTES # BLD AUTO: 0.9 K/UL — LOW (ref 1–3.3)
LYMPHOCYTES NFR BLD AUTO: 14 % — SIGNIFICANT CHANGE UP (ref 13–44)
MCHC RBC-ENTMCNC: 30.9 PG — SIGNIFICANT CHANGE UP (ref 27–34)
MCHC RBC-ENTMCNC: 31.9 G/DL — LOW (ref 32–36)
MCV RBC AUTO: 97.1 FL — SIGNIFICANT CHANGE UP (ref 80–100)
MONOCYTES # BLD AUTO: 0.63 K/UL — SIGNIFICANT CHANGE UP (ref 0–0.9)
MONOCYTES NFR BLD AUTO: 9.8 % — SIGNIFICANT CHANGE UP (ref 2–14)
NEUTROPHILS # BLD AUTO: 4.81 K/UL — SIGNIFICANT CHANGE UP (ref 1.8–7.4)
NEUTROPHILS NFR BLD AUTO: 75 % — SIGNIFICANT CHANGE UP (ref 43–77)
NRBC # BLD AUTO: 0 K/UL — SIGNIFICANT CHANGE UP (ref 0–0)
NRBC # FLD: 0 K/UL — SIGNIFICANT CHANGE UP (ref 0–0)
NRBC BLD AUTO-RTO: 0 /100 WBCS — SIGNIFICANT CHANGE UP (ref 0–0)
PLATELET # BLD AUTO: 282 K/UL — SIGNIFICANT CHANGE UP (ref 150–400)
PMV BLD: 9.4 FL — SIGNIFICANT CHANGE UP (ref 7–13)
RBC # BLD: 4.17 M/UL — SIGNIFICANT CHANGE UP (ref 3.8–5.2)
RBC # FLD: 13 % — SIGNIFICANT CHANGE UP (ref 10.3–14.5)
WBC # BLD: 6.42 K/UL — SIGNIFICANT CHANGE UP (ref 3.8–10.5)
WBC # FLD AUTO: 6.42 K/UL — SIGNIFICANT CHANGE UP (ref 3.8–10.5)

## 2025-06-05 PROCEDURE — 99215 OFFICE O/P EST HI 40 MIN: CPT

## 2025-06-06 LAB
ALBUMIN SERPL ELPH-MCNC: 4.4 G/DL — SIGNIFICANT CHANGE UP (ref 3.3–5)
ALP SERPL-CCNC: 55 U/L — SIGNIFICANT CHANGE UP (ref 40–120)
ALT FLD-CCNC: 23 U/L — SIGNIFICANT CHANGE UP (ref 10–40)
ANION GAP SERPL CALC-SCNC: 15 MMOL/L — SIGNIFICANT CHANGE UP (ref 5–17)
AST SERPL-CCNC: 24 U/L — SIGNIFICANT CHANGE UP (ref 10–35)
BILIRUB SERPL-MCNC: 0.4 MG/DL — SIGNIFICANT CHANGE UP (ref 0.2–1.2)
BUN SERPL-MCNC: 17 MG/DL — SIGNIFICANT CHANGE UP (ref 7–23)
CALCIUM SERPL-MCNC: 9.5 MG/DL — SIGNIFICANT CHANGE UP (ref 8.4–10.5)
CHLORIDE SERPL-SCNC: 104 MMOL/L — SIGNIFICANT CHANGE UP (ref 96–108)
CO2 SERPL-SCNC: 24 MMOL/L — SIGNIFICANT CHANGE UP (ref 22–31)
CREAT ?TM UR-MCNC: 116 MG/DL — SIGNIFICANT CHANGE UP
CREAT SERPL-MCNC: 0.65 MG/DL — SIGNIFICANT CHANGE UP (ref 0.5–1.3)
EGFR: 94 ML/MIN/1.73M2 — SIGNIFICANT CHANGE UP
EGFR: 94 ML/MIN/1.73M2 — SIGNIFICANT CHANGE UP
GLUCOSE SERPL-MCNC: 91 MG/DL — SIGNIFICANT CHANGE UP (ref 70–99)
POTASSIUM SERPL-MCNC: 4.6 MMOL/L — SIGNIFICANT CHANGE UP (ref 3.5–5.3)
POTASSIUM SERPL-SCNC: 4.6 MMOL/L — SIGNIFICANT CHANGE UP (ref 3.5–5.3)
PROT ?TM UR-MCNC: 18 MG/DL — HIGH (ref 0–12)
PROT SERPL-MCNC: 6.5 G/DL — SIGNIFICANT CHANGE UP (ref 6–8.3)
PROT/CREAT UR-RTO: 0.2 RATIO — SIGNIFICANT CHANGE UP (ref 0–0.2)
SODIUM SERPL-SCNC: 144 MMOL/L — SIGNIFICANT CHANGE UP (ref 135–145)

## 2025-06-12 ENCOUNTER — RESULT REVIEW (OUTPATIENT)
Age: 70
End: 2025-06-12

## 2025-06-12 ENCOUNTER — APPOINTMENT (OUTPATIENT)
Dept: HEMATOLOGY ONCOLOGY | Facility: CLINIC | Age: 70
End: 2025-06-12

## 2025-06-12 LAB
BASOPHILS # BLD AUTO: 0.03 K/UL — SIGNIFICANT CHANGE UP (ref 0–0.2)
BASOPHILS NFR BLD AUTO: 0.5 % — SIGNIFICANT CHANGE UP (ref 0–2)
EOSINOPHIL # BLD AUTO: 0.01 K/UL — SIGNIFICANT CHANGE UP (ref 0–0.5)
EOSINOPHIL NFR BLD AUTO: 0.2 % — SIGNIFICANT CHANGE UP (ref 0–6)
HCT VFR BLD CALC: 40 % — SIGNIFICANT CHANGE UP (ref 34.5–45)
HGB BLD-MCNC: 13 G/DL — SIGNIFICANT CHANGE UP (ref 11.5–15.5)
IMM GRANULOCYTES # BLD AUTO: 0.02 K/UL — SIGNIFICANT CHANGE UP (ref 0–0.07)
IMM GRANULOCYTES NFR BLD AUTO: 0.3 % — SIGNIFICANT CHANGE UP (ref 0–0.9)
LYMPHOCYTES # BLD AUTO: 0.64 K/UL — LOW (ref 1–3.3)
LYMPHOCYTES NFR BLD AUTO: 10.9 % — LOW (ref 13–44)
MCHC RBC-ENTMCNC: 31.6 PG — SIGNIFICANT CHANGE UP (ref 27–34)
MCHC RBC-ENTMCNC: 32.5 G/DL — SIGNIFICANT CHANGE UP (ref 32–36)
MCV RBC AUTO: 97.1 FL — SIGNIFICANT CHANGE UP (ref 80–100)
MONOCYTES # BLD AUTO: 0.38 K/UL — SIGNIFICANT CHANGE UP (ref 0–0.9)
MONOCYTES NFR BLD AUTO: 6.5 % — SIGNIFICANT CHANGE UP (ref 2–14)
NEUTROPHILS # BLD AUTO: 4.81 K/UL — SIGNIFICANT CHANGE UP (ref 1.8–7.4)
NEUTROPHILS NFR BLD AUTO: 81.6 % — HIGH (ref 43–77)
NRBC # BLD AUTO: 0 K/UL — SIGNIFICANT CHANGE UP (ref 0–0)
NRBC # FLD: 0 K/UL — SIGNIFICANT CHANGE UP (ref 0–0)
NRBC BLD AUTO-RTO: 0 /100 WBCS — SIGNIFICANT CHANGE UP (ref 0–0)
PLATELET # BLD AUTO: 264 K/UL — SIGNIFICANT CHANGE UP (ref 150–400)
PMV BLD: 8.9 FL — SIGNIFICANT CHANGE UP (ref 7–13)
RBC # BLD: 4.12 M/UL — SIGNIFICANT CHANGE UP (ref 3.8–5.2)
RBC # FLD: 12.8 % — SIGNIFICANT CHANGE UP (ref 10.3–14.5)
WBC # BLD: 5.89 K/UL — SIGNIFICANT CHANGE UP (ref 3.8–10.5)
WBC # FLD AUTO: 5.89 K/UL — SIGNIFICANT CHANGE UP (ref 3.8–10.5)

## 2025-06-13 ENCOUNTER — OUTPATIENT (OUTPATIENT)
Dept: OUTPATIENT SERVICES | Facility: HOSPITAL | Age: 70
LOS: 1 days | Discharge: HOME | End: 2025-06-13

## 2025-06-13 DIAGNOSIS — C71.9 MALIGNANT NEOPLASM OF BRAIN, UNSPECIFIED: ICD-10-CM

## 2025-06-13 DIAGNOSIS — Z87.898 PERSONAL HISTORY OF OTHER SPECIFIED CONDITIONS: Chronic | ICD-10-CM

## 2025-06-13 DIAGNOSIS — Z98.890 OTHER SPECIFIED POSTPROCEDURAL STATES: Chronic | ICD-10-CM

## 2025-06-18 NOTE — OCCUPATIONAL THERAPY INITIAL EVALUATION ADULT - GENERAL OBSERVATIONS, REHAB EVAL
Requested Prescriptions     Pending Prescriptions Disp Refills    LANSOPRAZOLE 30 MG Oral Capsule Delayed Release [Pharmacy Med Name: Lansoprazole Oral Capsule Delayed Release 30 MG] 90 capsule 0     Sig: TAKE 1 CAPSULE BY MOUTH EVERY DAY BEFORE BREAKFAST         LOV, procedure 1/7/2025         LR     3/10/2025      [place]   Left pt as received OOB in chair, alarm in place, NAD, +IV lock, +Tele//BP on RA A&Ox4, mild word finding difficulties at times. Pre/post pain 0/10. Pt agreeable to OT evaluation

## 2025-06-19 ENCOUNTER — APPOINTMENT (OUTPATIENT)
Dept: HEMATOLOGY ONCOLOGY | Facility: CLINIC | Age: 70
End: 2025-06-19

## 2025-06-19 ENCOUNTER — RESULT REVIEW (OUTPATIENT)
Age: 70
End: 2025-06-19

## 2025-06-19 LAB
BASOPHILS # BLD AUTO: 0.03 K/UL — SIGNIFICANT CHANGE UP (ref 0–0.2)
BASOPHILS NFR BLD AUTO: 0.5 % — SIGNIFICANT CHANGE UP (ref 0–2)
EOSINOPHIL # BLD AUTO: 0.02 K/UL — SIGNIFICANT CHANGE UP (ref 0–0.5)
EOSINOPHIL NFR BLD AUTO: 0.4 % — SIGNIFICANT CHANGE UP (ref 0–6)
HCT VFR BLD CALC: 42.5 % — SIGNIFICANT CHANGE UP (ref 34.5–45)
HGB BLD-MCNC: 13.7 G/DL — SIGNIFICANT CHANGE UP (ref 11.5–15.5)
IMM GRANULOCYTES # BLD AUTO: 0.02 K/UL — SIGNIFICANT CHANGE UP (ref 0–0.07)
IMM GRANULOCYTES NFR BLD AUTO: 0.4 % — SIGNIFICANT CHANGE UP (ref 0–0.9)
LYMPHOCYTES # BLD AUTO: 0.81 K/UL — LOW (ref 1–3.3)
LYMPHOCYTES NFR BLD AUTO: 14.4 % — SIGNIFICANT CHANGE UP (ref 13–44)
MCHC RBC-ENTMCNC: 31.1 PG — SIGNIFICANT CHANGE UP (ref 27–34)
MCHC RBC-ENTMCNC: 32.2 G/DL — SIGNIFICANT CHANGE UP (ref 32–36)
MCV RBC AUTO: 96.6 FL — SIGNIFICANT CHANGE UP (ref 80–100)
MONOCYTES # BLD AUTO: 0.6 K/UL — SIGNIFICANT CHANGE UP (ref 0–0.9)
MONOCYTES NFR BLD AUTO: 10.7 % — SIGNIFICANT CHANGE UP (ref 2–14)
NEUTROPHILS # BLD AUTO: 4.13 K/UL — SIGNIFICANT CHANGE UP (ref 1.8–7.4)
NEUTROPHILS NFR BLD AUTO: 73.6 % — SIGNIFICANT CHANGE UP (ref 43–77)
NRBC # BLD AUTO: 0 K/UL — SIGNIFICANT CHANGE UP (ref 0–0)
NRBC # FLD: 0 K/UL — SIGNIFICANT CHANGE UP (ref 0–0)
NRBC BLD AUTO-RTO: 0 /100 WBCS — SIGNIFICANT CHANGE UP (ref 0–0)
PLATELET # BLD AUTO: 265 K/UL — SIGNIFICANT CHANGE UP (ref 150–400)
PMV BLD: 9.3 FL — SIGNIFICANT CHANGE UP (ref 7–13)
RBC # BLD: 4.4 M/UL — SIGNIFICANT CHANGE UP (ref 3.8–5.2)
RBC # FLD: 12.7 % — SIGNIFICANT CHANGE UP (ref 10.3–14.5)
WBC # BLD: 5.61 K/UL — SIGNIFICANT CHANGE UP (ref 3.8–10.5)
WBC # FLD AUTO: 5.61 K/UL — SIGNIFICANT CHANGE UP (ref 3.8–10.5)

## 2025-06-26 ENCOUNTER — APPOINTMENT (OUTPATIENT)
Age: 70
End: 2025-06-26

## 2025-06-26 ENCOUNTER — RESULT REVIEW (OUTPATIENT)
Age: 70
End: 2025-06-26

## 2025-06-26 ENCOUNTER — APPOINTMENT (OUTPATIENT)
Dept: NEUROLOGY | Facility: CLINIC | Age: 70
End: 2025-06-26
Payer: MEDICARE

## 2025-06-26 PROBLEM — R10.32 CONTINUOUS LLQ ABDOMINAL PAIN: Status: ACTIVE | Noted: 2025-06-26

## 2025-06-26 LAB
BASOPHILS # BLD AUTO: 0.03 K/UL — SIGNIFICANT CHANGE UP (ref 0–0.2)
BASOPHILS NFR BLD AUTO: 0.5 % — SIGNIFICANT CHANGE UP (ref 0–2)
EOSINOPHIL # BLD AUTO: 0.02 K/UL — SIGNIFICANT CHANGE UP (ref 0–0.5)
EOSINOPHIL NFR BLD AUTO: 0.4 % — SIGNIFICANT CHANGE UP (ref 0–6)
HCT VFR BLD CALC: 42.4 % — SIGNIFICANT CHANGE UP (ref 34.5–45)
HGB BLD-MCNC: 13.5 G/DL — SIGNIFICANT CHANGE UP (ref 11.5–15.5)
IMM GRANULOCYTES # BLD AUTO: 0.02 K/UL — SIGNIFICANT CHANGE UP (ref 0–0.07)
IMM GRANULOCYTES NFR BLD AUTO: 0.4 % — SIGNIFICANT CHANGE UP (ref 0–0.9)
LYMPHOCYTES # BLD AUTO: 0.84 K/UL — LOW (ref 1–3.3)
LYMPHOCYTES NFR BLD AUTO: 14.8 % — SIGNIFICANT CHANGE UP (ref 13–44)
MCHC RBC-ENTMCNC: 30.6 PG — SIGNIFICANT CHANGE UP (ref 27–34)
MCHC RBC-ENTMCNC: 31.8 G/DL — LOW (ref 32–36)
MCV RBC AUTO: 96.1 FL — SIGNIFICANT CHANGE UP (ref 80–100)
MONOCYTES # BLD AUTO: 0.48 K/UL — SIGNIFICANT CHANGE UP (ref 0–0.9)
MONOCYTES NFR BLD AUTO: 8.5 % — SIGNIFICANT CHANGE UP (ref 2–14)
NEUTROPHILS # BLD AUTO: 4.28 K/UL — SIGNIFICANT CHANGE UP (ref 1.8–7.4)
NEUTROPHILS NFR BLD AUTO: 75.4 % — SIGNIFICANT CHANGE UP (ref 43–77)
NRBC # BLD AUTO: 0 K/UL — SIGNIFICANT CHANGE UP (ref 0–0)
NRBC # FLD: 0 K/UL — SIGNIFICANT CHANGE UP (ref 0–0)
NRBC BLD AUTO-RTO: 0 /100 WBCS — SIGNIFICANT CHANGE UP (ref 0–0)
PLATELET # BLD AUTO: 271 K/UL — SIGNIFICANT CHANGE UP (ref 150–400)
PMV BLD: 9.5 FL — SIGNIFICANT CHANGE UP (ref 7–13)
RBC # BLD: 4.41 M/UL — SIGNIFICANT CHANGE UP (ref 3.8–5.2)
RBC # FLD: 12.6 % — SIGNIFICANT CHANGE UP (ref 10.3–14.5)
WBC # BLD: 5.67 K/UL — SIGNIFICANT CHANGE UP (ref 3.8–10.5)
WBC # FLD AUTO: 5.67 K/UL — SIGNIFICANT CHANGE UP (ref 3.8–10.5)

## 2025-06-26 PROCEDURE — 99215 OFFICE O/P EST HI 40 MIN: CPT

## 2025-06-27 ENCOUNTER — APPOINTMENT (OUTPATIENT)
Dept: CT IMAGING | Facility: CLINIC | Age: 70
End: 2025-06-27
Payer: MEDICARE

## 2025-06-27 ENCOUNTER — OUTPATIENT (OUTPATIENT)
Dept: OUTPATIENT SERVICES | Facility: HOSPITAL | Age: 70
LOS: 1 days | End: 2025-06-27

## 2025-06-27 DIAGNOSIS — R10.32 LEFT LOWER QUADRANT PAIN: ICD-10-CM

## 2025-06-27 DIAGNOSIS — Z87.898 PERSONAL HISTORY OF OTHER SPECIFIED CONDITIONS: Chronic | ICD-10-CM

## 2025-06-27 DIAGNOSIS — Z51.11 ENCOUNTER FOR ANTINEOPLASTIC CHEMOTHERAPY: ICD-10-CM

## 2025-06-27 LAB
ALBUMIN SERPL ELPH-MCNC: 4.3 G/DL — SIGNIFICANT CHANGE UP (ref 3.3–5)
ALP SERPL-CCNC: 50 U/L — SIGNIFICANT CHANGE UP (ref 40–120)
ALT FLD-CCNC: 21 U/L — SIGNIFICANT CHANGE UP (ref 10–40)
ANION GAP SERPL CALC-SCNC: 16 MMOL/L — SIGNIFICANT CHANGE UP (ref 5–17)
AST SERPL-CCNC: 22 U/L — SIGNIFICANT CHANGE UP (ref 10–35)
BILIRUB SERPL-MCNC: 0.5 MG/DL — SIGNIFICANT CHANGE UP (ref 0.2–1.2)
BUN SERPL-MCNC: 19 MG/DL — SIGNIFICANT CHANGE UP (ref 7–23)
CALCIUM SERPL-MCNC: 9.7 MG/DL — SIGNIFICANT CHANGE UP (ref 8.4–10.5)
CHLORIDE SERPL-SCNC: 105 MMOL/L — SIGNIFICANT CHANGE UP (ref 96–108)
CO2 SERPL-SCNC: 22 MMOL/L — SIGNIFICANT CHANGE UP (ref 22–31)
CREAT ?TM UR-MCNC: 72 MG/DL — SIGNIFICANT CHANGE UP
CREAT SERPL-MCNC: 0.68 MG/DL — SIGNIFICANT CHANGE UP (ref 0.5–1.3)
EGFR: 93 ML/MIN/1.73M2 — SIGNIFICANT CHANGE UP
EGFR: 93 ML/MIN/1.73M2 — SIGNIFICANT CHANGE UP
GLUCOSE SERPL-MCNC: 113 MG/DL — HIGH (ref 70–99)
POTASSIUM SERPL-MCNC: 4 MMOL/L — SIGNIFICANT CHANGE UP (ref 3.5–5.3)
POTASSIUM SERPL-SCNC: 4 MMOL/L — SIGNIFICANT CHANGE UP (ref 3.5–5.3)
PROT ?TM UR-MCNC: 6 MG/DL — SIGNIFICANT CHANGE UP (ref 0–12)
PROT SERPL-MCNC: 7 G/DL — SIGNIFICANT CHANGE UP (ref 6–8.3)
PROT/CREAT UR-RTO: 0.1 RATIO — SIGNIFICANT CHANGE UP (ref 0–0.2)
SODIUM SERPL-SCNC: 143 MMOL/L — SIGNIFICANT CHANGE UP (ref 135–145)

## 2025-06-27 PROCEDURE — 74177 CT ABD & PELVIS W/CONTRAST: CPT | Mod: 26

## 2025-07-17 ENCOUNTER — RESULT REVIEW (OUTPATIENT)
Age: 70
End: 2025-07-17

## 2025-07-17 ENCOUNTER — APPOINTMENT (OUTPATIENT)
Dept: NEUROLOGY | Facility: CLINIC | Age: 70
End: 2025-07-17
Payer: MEDICARE

## 2025-07-17 ENCOUNTER — APPOINTMENT (OUTPATIENT)
Age: 70
End: 2025-07-17

## 2025-07-17 LAB
BASOPHILS # BLD AUTO: 0.02 K/UL — SIGNIFICANT CHANGE UP (ref 0–0.2)
BASOPHILS NFR BLD AUTO: 0.3 % — SIGNIFICANT CHANGE UP (ref 0–2)
EOSINOPHIL # BLD AUTO: 0.02 K/UL — SIGNIFICANT CHANGE UP (ref 0–0.5)
EOSINOPHIL NFR BLD AUTO: 0.3 % — SIGNIFICANT CHANGE UP (ref 0–6)
HCT VFR BLD CALC: 41.4 % — SIGNIFICANT CHANGE UP (ref 34.5–45)
HGB BLD-MCNC: 13.5 G/DL — SIGNIFICANT CHANGE UP (ref 11.5–15.5)
IMM GRANULOCYTES # BLD AUTO: 0.03 K/UL — SIGNIFICANT CHANGE UP (ref 0–0.07)
IMM GRANULOCYTES NFR BLD AUTO: 0.5 % — SIGNIFICANT CHANGE UP (ref 0–0.9)
LYMPHOCYTES # BLD AUTO: 0.82 K/UL — LOW (ref 1–3.3)
LYMPHOCYTES NFR BLD AUTO: 13.4 % — SIGNIFICANT CHANGE UP (ref 13–44)
MCHC RBC-ENTMCNC: 31 PG — SIGNIFICANT CHANGE UP (ref 27–34)
MCHC RBC-ENTMCNC: 32.6 G/DL — SIGNIFICANT CHANGE UP (ref 32–36)
MCV RBC AUTO: 95 FL — SIGNIFICANT CHANGE UP (ref 80–100)
MONOCYTES # BLD AUTO: 0.58 K/UL — SIGNIFICANT CHANGE UP (ref 0–0.9)
MONOCYTES NFR BLD AUTO: 9.4 % — SIGNIFICANT CHANGE UP (ref 2–14)
NEUTROPHILS # BLD AUTO: 4.67 K/UL — SIGNIFICANT CHANGE UP (ref 1.8–7.4)
NEUTROPHILS NFR BLD AUTO: 76.1 % — SIGNIFICANT CHANGE UP (ref 43–77)
NRBC # BLD AUTO: 0 K/UL — SIGNIFICANT CHANGE UP (ref 0–0)
NRBC # FLD: 0 K/UL — SIGNIFICANT CHANGE UP (ref 0–0)
NRBC BLD AUTO-RTO: 0 /100 WBCS — SIGNIFICANT CHANGE UP (ref 0–0)
PLATELET # BLD AUTO: 281 K/UL — SIGNIFICANT CHANGE UP (ref 150–400)
PMV BLD: 9.3 FL — SIGNIFICANT CHANGE UP (ref 7–13)
RBC # BLD: 4.36 M/UL — SIGNIFICANT CHANGE UP (ref 3.8–5.2)
RBC # FLD: 12.7 % — SIGNIFICANT CHANGE UP (ref 10.3–14.5)
WBC # BLD: 6.14 K/UL — SIGNIFICANT CHANGE UP (ref 3.8–10.5)
WBC # FLD AUTO: 6.14 K/UL — SIGNIFICANT CHANGE UP (ref 3.8–10.5)

## 2025-07-17 PROCEDURE — 99215 OFFICE O/P EST HI 40 MIN: CPT

## 2025-07-18 LAB
ALBUMIN SERPL ELPH-MCNC: 4.6 G/DL — SIGNIFICANT CHANGE UP (ref 3.3–5)
ALP SERPL-CCNC: 52 U/L — SIGNIFICANT CHANGE UP (ref 40–120)
ALT FLD-CCNC: 18 U/L — SIGNIFICANT CHANGE UP (ref 10–40)
ANION GAP SERPL CALC-SCNC: 15 MMOL/L — SIGNIFICANT CHANGE UP (ref 5–17)
AST SERPL-CCNC: 27 U/L — SIGNIFICANT CHANGE UP (ref 10–35)
BILIRUB SERPL-MCNC: 0.4 MG/DL — SIGNIFICANT CHANGE UP (ref 0.2–1.2)
BUN SERPL-MCNC: 24 MG/DL — HIGH (ref 7–23)
CALCIUM SERPL-MCNC: 10 MG/DL — SIGNIFICANT CHANGE UP (ref 8.4–10.5)
CHLORIDE SERPL-SCNC: 106 MMOL/L — SIGNIFICANT CHANGE UP (ref 96–108)
CO2 SERPL-SCNC: 22 MMOL/L — SIGNIFICANT CHANGE UP (ref 22–31)
CREAT ?TM UR-MCNC: 96 MG/DL — SIGNIFICANT CHANGE UP
CREAT SERPL-MCNC: 0.72 MG/DL — SIGNIFICANT CHANGE UP (ref 0.5–1.3)
EGFR: 89 ML/MIN/1.73M2 — SIGNIFICANT CHANGE UP
EGFR: 89 ML/MIN/1.73M2 — SIGNIFICANT CHANGE UP
GLUCOSE SERPL-MCNC: 104 MG/DL — HIGH (ref 70–99)
POTASSIUM SERPL-MCNC: 4.4 MMOL/L — SIGNIFICANT CHANGE UP (ref 3.5–5.3)
POTASSIUM SERPL-SCNC: 4.4 MMOL/L — SIGNIFICANT CHANGE UP (ref 3.5–5.3)
PROT ?TM UR-MCNC: 8 MG/DL — SIGNIFICANT CHANGE UP (ref 0–12)
PROT SERPL-MCNC: 7.1 G/DL — SIGNIFICANT CHANGE UP (ref 6–8.3)
PROT/CREAT UR-RTO: 0.1 RATIO — SIGNIFICANT CHANGE UP (ref 0–0.2)
SODIUM SERPL-SCNC: 143 MMOL/L — SIGNIFICANT CHANGE UP (ref 135–145)

## 2025-08-07 ENCOUNTER — RESULT REVIEW (OUTPATIENT)
Age: 70
End: 2025-08-07

## 2025-08-07 ENCOUNTER — APPOINTMENT (OUTPATIENT)
Age: 70
End: 2025-08-07

## 2025-08-07 ENCOUNTER — APPOINTMENT (OUTPATIENT)
Dept: NEUROLOGY | Facility: CLINIC | Age: 70
End: 2025-08-07
Payer: MEDICARE

## 2025-08-07 DIAGNOSIS — C71.9 MALIGNANT NEOPLASM OF BRAIN, UNSPECIFIED: ICD-10-CM

## 2025-08-07 DIAGNOSIS — G40.909 EPILEPSY, UNSPECIFIED, NOT INTRACTABLE, W/OUT STATUS EPILEPTICUS: ICD-10-CM

## 2025-08-07 DIAGNOSIS — G81.94 HEMIPLEGIA, UNSPECIFIED AFFECTING LEFT NONDOMINANT SIDE: ICD-10-CM

## 2025-08-07 LAB
BASOPHILS # BLD AUTO: 0.03 K/UL — SIGNIFICANT CHANGE UP (ref 0–0.2)
BASOPHILS NFR BLD AUTO: 0.5 % — SIGNIFICANT CHANGE UP (ref 0–2)
EOSINOPHIL # BLD AUTO: 0.02 K/UL — SIGNIFICANT CHANGE UP (ref 0–0.5)
EOSINOPHIL NFR BLD AUTO: 0.3 % — SIGNIFICANT CHANGE UP (ref 0–6)
HCT VFR BLD CALC: 40.7 % — SIGNIFICANT CHANGE UP (ref 34.5–45)
HGB BLD-MCNC: 13.3 G/DL — SIGNIFICANT CHANGE UP (ref 11.5–15.5)
IMM GRANULOCYTES # BLD AUTO: 0.03 K/UL — SIGNIFICANT CHANGE UP (ref 0–0.07)
IMM GRANULOCYTES NFR BLD AUTO: 0.5 % — SIGNIFICANT CHANGE UP (ref 0–0.9)
LYMPHOCYTES # BLD AUTO: 0.95 K/UL — LOW (ref 1–3.3)
LYMPHOCYTES NFR BLD AUTO: 16 % — SIGNIFICANT CHANGE UP (ref 13–44)
MCHC RBC-ENTMCNC: 31.4 PG — SIGNIFICANT CHANGE UP (ref 27–34)
MCHC RBC-ENTMCNC: 32.7 G/DL — SIGNIFICANT CHANGE UP (ref 32–36)
MCV RBC AUTO: 96 FL — SIGNIFICANT CHANGE UP (ref 80–100)
MONOCYTES # BLD AUTO: 0.59 K/UL — SIGNIFICANT CHANGE UP (ref 0–0.9)
MONOCYTES NFR BLD AUTO: 9.9 % — SIGNIFICANT CHANGE UP (ref 2–14)
NEUTROPHILS # BLD AUTO: 4.31 K/UL — SIGNIFICANT CHANGE UP (ref 1.8–7.4)
NEUTROPHILS NFR BLD AUTO: 72.8 % — SIGNIFICANT CHANGE UP (ref 43–77)
NRBC # BLD AUTO: 0 K/UL — SIGNIFICANT CHANGE UP (ref 0–0)
NRBC # FLD: 0 K/UL — SIGNIFICANT CHANGE UP (ref 0–0)
NRBC BLD AUTO-RTO: 0 /100 WBCS — SIGNIFICANT CHANGE UP (ref 0–0)
PLATELET # BLD AUTO: 275 K/UL — SIGNIFICANT CHANGE UP (ref 150–400)
PMV BLD: 9.5 FL — SIGNIFICANT CHANGE UP (ref 7–13)
RBC # BLD: 4.24 M/UL — SIGNIFICANT CHANGE UP (ref 3.8–5.2)
RBC # FLD: 12.4 % — SIGNIFICANT CHANGE UP (ref 10.3–14.5)
WBC # BLD: 5.93 K/UL — SIGNIFICANT CHANGE UP (ref 3.8–10.5)
WBC # FLD AUTO: 5.93 K/UL — SIGNIFICANT CHANGE UP (ref 3.8–10.5)

## 2025-08-07 PROCEDURE — 99215 OFFICE O/P EST HI 40 MIN: CPT

## 2025-08-08 LAB
ALBUMIN SERPL ELPH-MCNC: 4.3 G/DL — SIGNIFICANT CHANGE UP (ref 3.3–5)
ALP SERPL-CCNC: 46 U/L — SIGNIFICANT CHANGE UP (ref 40–120)
ALT FLD-CCNC: 20 U/L — SIGNIFICANT CHANGE UP (ref 10–40)
ANION GAP SERPL CALC-SCNC: 20 MMOL/L — HIGH (ref 5–17)
AST SERPL-CCNC: 34 U/L — SIGNIFICANT CHANGE UP (ref 10–35)
BILIRUB SERPL-MCNC: 0.2 MG/DL — SIGNIFICANT CHANGE UP (ref 0.2–1.2)
BUN SERPL-MCNC: 20 MG/DL — SIGNIFICANT CHANGE UP (ref 7–23)
CALCIUM SERPL-MCNC: 10 MG/DL — SIGNIFICANT CHANGE UP (ref 8.4–10.5)
CHLORIDE SERPL-SCNC: 103 MMOL/L — SIGNIFICANT CHANGE UP (ref 96–108)
CO2 SERPL-SCNC: 17 MMOL/L — LOW (ref 22–31)
CREAT ?TM UR-MCNC: 53 MG/DL — SIGNIFICANT CHANGE UP
CREAT SERPL-MCNC: 0.58 MG/DL — SIGNIFICANT CHANGE UP (ref 0.5–1.3)
EGFR: 97 ML/MIN/1.73M2 — SIGNIFICANT CHANGE UP
EGFR: 97 ML/MIN/1.73M2 — SIGNIFICANT CHANGE UP
GLUCOSE SERPL-MCNC: 83 MG/DL — SIGNIFICANT CHANGE UP (ref 70–99)
POTASSIUM SERPL-MCNC: 4.8 MMOL/L — SIGNIFICANT CHANGE UP (ref 3.5–5.3)
POTASSIUM SERPL-SCNC: 4.8 MMOL/L — SIGNIFICANT CHANGE UP (ref 3.5–5.3)
PROT ?TM UR-MCNC: 7 MG/DL — SIGNIFICANT CHANGE UP (ref 0–12)
PROT SERPL-MCNC: 7 G/DL — SIGNIFICANT CHANGE UP (ref 6–8.3)
PROT/CREAT UR-RTO: 0.1 RATIO — SIGNIFICANT CHANGE UP (ref 0–0.2)
SODIUM SERPL-SCNC: 140 MMOL/L — SIGNIFICANT CHANGE UP (ref 135–145)

## 2025-08-26 ENCOUNTER — APPOINTMENT (OUTPATIENT)
Dept: MRI IMAGING | Facility: CLINIC | Age: 70
End: 2025-08-26
Payer: MEDICARE

## 2025-08-26 ENCOUNTER — OUTPATIENT (OUTPATIENT)
Dept: OUTPATIENT SERVICES | Facility: HOSPITAL | Age: 70
LOS: 1 days | End: 2025-08-26

## 2025-08-26 DIAGNOSIS — Z98.890 OTHER SPECIFIED POSTPROCEDURAL STATES: Chronic | ICD-10-CM

## 2025-08-26 DIAGNOSIS — C71.9 MALIGNANT NEOPLASM OF BRAIN, UNSPECIFIED: ICD-10-CM

## 2025-08-26 DIAGNOSIS — Z87.898 PERSONAL HISTORY OF OTHER SPECIFIED CONDITIONS: Chronic | ICD-10-CM

## 2025-08-26 PROCEDURE — 70553 MRI BRAIN STEM W/O & W/DYE: CPT | Mod: 26

## 2025-08-28 ENCOUNTER — RESULT REVIEW (OUTPATIENT)
Age: 70
End: 2025-08-28

## 2025-08-28 ENCOUNTER — APPOINTMENT (OUTPATIENT)
Dept: NEUROLOGY | Facility: CLINIC | Age: 70
End: 2025-08-28
Payer: MEDICARE

## 2025-08-28 ENCOUNTER — APPOINTMENT (OUTPATIENT)
Age: 70
End: 2025-08-28

## 2025-08-28 DIAGNOSIS — G40.909 EPILEPSY, UNSPECIFIED, NOT INTRACTABLE, W/OUT STATUS EPILEPTICUS: ICD-10-CM

## 2025-08-28 DIAGNOSIS — C71.9 MALIGNANT NEOPLASM OF BRAIN, UNSPECIFIED: ICD-10-CM

## 2025-08-28 PROCEDURE — 99215 OFFICE O/P EST HI 40 MIN: CPT

## 2025-09-18 ENCOUNTER — APPOINTMENT (OUTPATIENT)
Age: 70
End: 2025-09-18

## (undated) DEVICE — STRYKER SONOPET IQ TIP 12CM STANDARD

## (undated) DEVICE — ACRA-CUT CRANIAL PERFORATOR ADULT 14MM X 11MM (WHITE)

## (undated) DEVICE — DRAPE XL SHEET 77X98"

## (undated) DEVICE — ELCTR SUBDERMAL NDL CLASSIC 1.5M X 59" (6 COLOR)

## (undated) DEVICE — SYR LUER LOK 20CC

## (undated) DEVICE — SUT VICRYL 0 18" CT-1 UNDYED (POP-OFF)

## (undated) DEVICE — CLIPPER BLADE NEURO (BLUE)

## (undated) DEVICE — DRAPE TOWEL BLUE 17" X 24"

## (undated) DEVICE — ELCTR SUBDERMAL NDL 27G X 1/2" WITH TWISTED PAIR

## (undated) DEVICE — MIDAS REX MR8 TAPERED SM BORE 2.3MM X 7CM FOOTED

## (undated) DEVICE — VENODYNE/SCD SLEEVE CALF MEDIUM

## (undated) DEVICE — STAPLER SKIN PROXIMATE

## (undated) DEVICE — ELCTR PEDICLE SCREW PROBE 3MM BALL 1.8MM X 100MM

## (undated) DEVICE — BIPOLAR FORCEP SYMMETRY BAYONET 7" X 1.5MM SMOOTH (SILVER)

## (undated) DEVICE — AESCULAP SCALPFIX 10 CLIPS

## (undated) DEVICE — TUBING CONNECTING 6MM 20FT

## (undated) DEVICE — WARMING BLANKET LOWER ADULT

## (undated) DEVICE — DRAPE MICROSCOPE ZEISS OPMI VISIONGUARD 154 X 52"

## (undated) DEVICE — ELCTR BIPOLAR PROBE

## (undated) DEVICE — ELCTR 4-DISC 20MM 49" (RED, BLUE, GREEN, BLACK)

## (undated) DEVICE — ELCTR ROCKER SWITCH PENCIL BLUE 10FT

## (undated) DEVICE — SUT VICRYL 2-0 18" CP-2 UNDYED (POP-OFF)

## (undated) DEVICE — ELCTR GROUNDING PAD ADULT COVIDIEN

## (undated) DEVICE — ELCTR BOVIE TIP BLADE INSULATED 4" EDGE

## (undated) DEVICE — CATH IV SAFE BC 18G X 1.16" (GREEN)

## (undated) DEVICE — Device

## (undated) DEVICE — PACK NEURO

## (undated) DEVICE — DRAPE INSTRUMENT POUCH 6.75" X 11"

## (undated) DEVICE — DRAPE CRANIOTOMY W POUCH 100X104"

## (undated) DEVICE — DRSG 4 X 8

## (undated) DEVICE — VAGINAL PACKING 2"

## (undated) DEVICE — MIDAS REX LEGEND TAPERED SM BORE 2.3MM X 8CM

## (undated) DEVICE — POSITIONER FOAM EGG CRATE ULNAR 2PCS (PINK)

## (undated) DEVICE — SOL IRR POUR NS 0.9% 1000ML

## (undated) DEVICE — ELCTR SUBDERMAL CORKSCREW NDL 1.2MM

## (undated) DEVICE — TUBING FOR SMOKE EVACUATOR (PURPLE END)

## (undated) DEVICE — SUT NUROLON 4-0 8-18" RB-1 (POP-OFF)

## (undated) DEVICE — SOL IRR POUR H2O 1000ML

## (undated) DEVICE — STRYKER SONOPET IQ TUBING SET

## (undated) DEVICE — MARKING PEN W RULER

## (undated) DEVICE — DRSG TELFA 3 X 8

## (undated) DEVICE — DRSG XEROFORM 1 X 8"

## (undated) DEVICE — ELCTR MONOPOLAR STIMULATOR PROBE FLUSH-TIP

## (undated) DEVICE — PREP DURAPREP 26CC